# Patient Record
Sex: FEMALE | Race: BLACK OR AFRICAN AMERICAN | NOT HISPANIC OR LATINO | Employment: OTHER | RURAL
[De-identification: names, ages, dates, MRNs, and addresses within clinical notes are randomized per-mention and may not be internally consistent; named-entity substitution may affect disease eponyms.]

---

## 2018-09-12 LAB — CRC RECOMMENDATION EXT: NORMAL

## 2020-10-26 ENCOUNTER — HISTORICAL (OUTPATIENT)
Dept: ADMINISTRATIVE | Facility: HOSPITAL | Age: 45
End: 2020-10-26

## 2020-10-26 LAB
ALT SERPL W P-5'-P-CCNC: 66 U/L (ref 13–56)
AST SERPL W P-5'-P-CCNC: 52 U/L (ref 15–37)
BASOPHILS # BLD AUTO: 0.04 X10E3/UL (ref 0–0.2)
BASOPHILS NFR BLD AUTO: 0.7 % (ref 0–1)
CRP SERPL-MCNC: <0.29 MG/DL (ref 0–0.8)
EOSINOPHIL # BLD AUTO: 0.03 X10E3/UL (ref 0–0.5)
EOSINOPHIL NFR BLD AUTO: 0.5 % (ref 1–4)
ERYTHROCYTE [DISTWIDTH] IN BLOOD BY AUTOMATED COUNT: 12.8 % (ref 11.5–14.5)
ERYTHROCYTE [SEDIMENTATION RATE] IN BLOOD BY WESTERGREN METHOD: 14 MM/HR (ref 0–20)
HCT VFR BLD AUTO: 43.5 % (ref 38–47)
HGB BLD-MCNC: 13.8 G/DL (ref 12–16)
IMM GRANULOCYTES # BLD AUTO: 0.01 X10E3/UL (ref 0–0.04)
IMM GRANULOCYTES NFR BLD: 0.2 % (ref 0–0.4)
LYMPHOCYTES # BLD AUTO: 2.01 X10E3/UL (ref 1–4.8)
LYMPHOCYTES NFR BLD AUTO: 36.7 % (ref 27–41)
MCH RBC QN AUTO: 27.7 PG (ref 27–31)
MCHC RBC AUTO-ENTMCNC: 31.7 G/DL (ref 32–36)
MCV RBC AUTO: 87.2 FL (ref 80–96)
MONOCYTES # BLD AUTO: 0.33 X10E3/UL (ref 0–0.8)
MONOCYTES NFR BLD AUTO: 6 % (ref 2–6)
MPC BLD CALC-MCNC: 11.5 FL (ref 9.4–12.4)
NEUTROPHILS # BLD AUTO: 3.05 X10E3/UL (ref 1.8–7.7)
NEUTROPHILS NFR BLD AUTO: 55.9 % (ref 53–65)
NRBC # BLD AUTO: 0 X10E3/UL (ref 0–0)
NRBC, AUTO (.00): 0 /100 (ref 0–0)
PLATELET # BLD AUTO: 308 X10E3/UL (ref 150–400)
RBC # BLD AUTO: 4.99 X10E6/UL (ref 4.2–5.4)
WBC # BLD AUTO: 5.47 X10E3/UL (ref 4.5–11)

## 2021-03-31 ENCOUNTER — HISTORICAL (OUTPATIENT)
Dept: ADMINISTRATIVE | Facility: HOSPITAL | Age: 46
End: 2021-03-31

## 2021-06-21 RX ORDER — METHOTREXATE 2.5 MG/1
TABLET ORAL
COMMUNITY
End: 2023-03-28

## 2021-06-21 RX ORDER — OXYBUTYNIN CHLORIDE 5 MG/1
5 TABLET ORAL NIGHTLY
COMMUNITY
End: 2022-02-11 | Stop reason: ALTCHOICE

## 2021-06-21 RX ORDER — ASPIRIN 81 MG/1
81 TABLET ORAL DAILY
COMMUNITY
End: 2023-06-14

## 2021-06-21 RX ORDER — CARBAMAZEPINE 100 MG/1
100 CAPSULE, EXTENDED RELEASE ORAL 2 TIMES DAILY
COMMUNITY
End: 2021-06-29

## 2021-06-21 RX ORDER — METFORMIN HYDROCHLORIDE 500 MG/1
500 TABLET ORAL 2 TIMES DAILY WITH MEALS
COMMUNITY
End: 2022-02-11 | Stop reason: SDUPTHER

## 2021-06-21 RX ORDER — LORATADINE 10 MG/1
10 TABLET ORAL DAILY
COMMUNITY
End: 2021-08-09 | Stop reason: SDUPTHER

## 2021-06-21 RX ORDER — LISINOPRIL 10 MG/1
10 TABLET ORAL DAILY
COMMUNITY
End: 2021-08-09 | Stop reason: SDUPTHER

## 2021-06-21 RX ORDER — ATORVASTATIN CALCIUM 20 MG/1
20 TABLET, FILM COATED ORAL DAILY
COMMUNITY
End: 2021-08-09 | Stop reason: ALTCHOICE

## 2021-06-21 RX ORDER — GABAPENTIN 300 MG/1
300 CAPSULE ORAL 3 TIMES DAILY
COMMUNITY
End: 2021-06-29 | Stop reason: SDUPTHER

## 2021-06-29 ENCOUNTER — OFFICE VISIT (OUTPATIENT)
Dept: FAMILY MEDICINE | Facility: CLINIC | Age: 46
End: 2021-06-29
Payer: MEDICAID

## 2021-06-29 VITALS
SYSTOLIC BLOOD PRESSURE: 126 MMHG | OXYGEN SATURATION: 100 % | RESPIRATION RATE: 18 BRPM | BODY MASS INDEX: 30.86 KG/M2 | HEIGHT: 58 IN | HEART RATE: 86 BPM | WEIGHT: 147 LBS | DIASTOLIC BLOOD PRESSURE: 92 MMHG

## 2021-06-29 DIAGNOSIS — R51.9 CHRONIC INTRACTABLE HEADACHE, UNSPECIFIED HEADACHE TYPE: ICD-10-CM

## 2021-06-29 DIAGNOSIS — M79.2 NEURALGIA: ICD-10-CM

## 2021-06-29 DIAGNOSIS — E53.8 VITAMIN B12 DEFICIENCY: Primary | ICD-10-CM

## 2021-06-29 DIAGNOSIS — G89.29 CHRONIC INTRACTABLE HEADACHE, UNSPECIFIED HEADACHE TYPE: ICD-10-CM

## 2021-06-29 LAB
CRP SERPL-MCNC: <0.29 MG/DL (ref 0–0.8)
ERYTHROCYTE [SEDIMENTATION RATE] IN BLOOD BY WESTERGREN METHOD: 21 MM/HR (ref 0–20)

## 2021-06-29 PROCEDURE — 85651 RBC SED RATE NONAUTOMATED: CPT | Mod: ,,, | Performed by: CLINICAL MEDICAL LABORATORY

## 2021-06-29 PROCEDURE — 96372 PR INJECTION,THERAP/PROPH/DIAG2ST, IM OR SUBCUT: ICD-10-PCS | Mod: ,,, | Performed by: NURSE PRACTITIONER

## 2021-06-29 PROCEDURE — 96372 THER/PROPH/DIAG INJ SC/IM: CPT | Mod: ,,, | Performed by: NURSE PRACTITIONER

## 2021-06-29 PROCEDURE — 99214 PR OFFICE/OUTPT VISIT, EST, LEVL IV, 30-39 MIN: ICD-10-PCS | Mod: 25,,, | Performed by: NURSE PRACTITIONER

## 2021-06-29 PROCEDURE — 99214 OFFICE O/P EST MOD 30 MIN: CPT | Mod: 25,,, | Performed by: NURSE PRACTITIONER

## 2021-06-29 PROCEDURE — 86140 C-REACTIVE PROTEIN: CPT | Mod: ,,, | Performed by: CLINICAL MEDICAL LABORATORY

## 2021-06-29 PROCEDURE — 85651 SEDIMENTATION RATE, AUTOMATED: ICD-10-PCS | Mod: ,,, | Performed by: CLINICAL MEDICAL LABORATORY

## 2021-06-29 PROCEDURE — 86140 C-REACTIVE PROTEIN: ICD-10-PCS | Mod: ,,, | Performed by: CLINICAL MEDICAL LABORATORY

## 2021-06-29 RX ORDER — CYANOCOBALAMIN 1000 UG/ML
1000 INJECTION, SOLUTION INTRAMUSCULAR; SUBCUTANEOUS
Status: COMPLETED | OUTPATIENT
Start: 2021-06-29 | End: 2021-06-29

## 2021-06-29 RX ORDER — GABAPENTIN 300 MG/1
300 CAPSULE ORAL 3 TIMES DAILY
Qty: 90 CAPSULE | Refills: 1 | Status: SHIPPED | OUTPATIENT
Start: 2021-06-29 | End: 2021-08-09 | Stop reason: SDUPTHER

## 2021-06-29 RX ADMIN — CYANOCOBALAMIN 1000 MCG: 1000 INJECTION, SOLUTION INTRAMUSCULAR; SUBCUTANEOUS at 10:06

## 2021-06-30 ENCOUNTER — TELEPHONE (OUTPATIENT)
Dept: FAMILY MEDICINE | Facility: CLINIC | Age: 46
End: 2021-06-30

## 2021-07-09 ENCOUNTER — CLINICAL SUPPORT (OUTPATIENT)
Dept: FAMILY MEDICINE | Facility: CLINIC | Age: 46
End: 2021-07-09
Payer: MEDICAID

## 2021-07-09 DIAGNOSIS — E53.8 VITAMIN B12 DEFICIENCY: Primary | ICD-10-CM

## 2021-07-09 PROCEDURE — 96372 PR INJECTION,THERAP/PROPH/DIAG2ST, IM OR SUBCUT: ICD-10-PCS | Mod: ,,, | Performed by: NURSE PRACTITIONER

## 2021-07-09 PROCEDURE — 96372 THER/PROPH/DIAG INJ SC/IM: CPT | Mod: ,,, | Performed by: NURSE PRACTITIONER

## 2021-07-09 RX ORDER — CYANOCOBALAMIN 1000 UG/ML
1000 INJECTION, SOLUTION INTRAMUSCULAR; SUBCUTANEOUS
Status: COMPLETED | OUTPATIENT
Start: 2021-07-09 | End: 2021-07-09

## 2021-07-09 RX ADMIN — CYANOCOBALAMIN 1000 MCG: 1000 INJECTION, SOLUTION INTRAMUSCULAR; SUBCUTANEOUS at 03:07

## 2021-07-16 ENCOUNTER — IMMUNIZATION (OUTPATIENT)
Dept: FAMILY MEDICINE | Facility: CLINIC | Age: 46
End: 2021-07-16
Payer: MEDICAID

## 2021-07-16 DIAGNOSIS — Z23 NEED FOR VACCINATION: Primary | ICD-10-CM

## 2021-07-16 PROCEDURE — 0011A COVID-19, MRNA, LNP-S, PF, 100 MCG/0.5 ML DOSE VACCINE: ICD-10-PCS | Mod: ,,, | Performed by: FAMILY MEDICINE

## 2021-07-16 PROCEDURE — 91301 COVID-19, MRNA, LNP-S, PF, 100 MCG/0.5 ML DOSE VACCINE: ICD-10-PCS | Mod: ,,, | Performed by: FAMILY MEDICINE

## 2021-07-16 PROCEDURE — 91301 COVID-19, MRNA, LNP-S, PF, 100 MCG/0.5 ML DOSE VACCINE: CPT | Mod: ,,, | Performed by: FAMILY MEDICINE

## 2021-07-16 PROCEDURE — 0011A COVID-19, MRNA, LNP-S, PF, 100 MCG/0.5 ML DOSE VACCINE: CPT | Mod: ,,, | Performed by: FAMILY MEDICINE

## 2021-07-23 ENCOUNTER — CLINICAL SUPPORT (OUTPATIENT)
Dept: FAMILY MEDICINE | Facility: CLINIC | Age: 46
End: 2021-07-23
Payer: MEDICAID

## 2021-07-23 DIAGNOSIS — E53.8 VITAMIN B12 DEFICIENCY: Primary | ICD-10-CM

## 2021-07-23 PROCEDURE — 96372 THER/PROPH/DIAG INJ SC/IM: CPT | Mod: ,,, | Performed by: NURSE PRACTITIONER

## 2021-07-23 PROCEDURE — 96372 PR INJECTION,THERAP/PROPH/DIAG2ST, IM OR SUBCUT: ICD-10-PCS | Mod: ,,, | Performed by: NURSE PRACTITIONER

## 2021-07-23 RX ORDER — CYANOCOBALAMIN 1000 UG/ML
1000 INJECTION, SOLUTION INTRAMUSCULAR; SUBCUTANEOUS
Status: COMPLETED | OUTPATIENT
Start: 2021-07-23 | End: 2021-07-23

## 2021-07-23 RX ADMIN — CYANOCOBALAMIN 1000 MCG: 1000 INJECTION, SOLUTION INTRAMUSCULAR; SUBCUTANEOUS at 03:07

## 2021-07-30 ENCOUNTER — CLINICAL SUPPORT (OUTPATIENT)
Dept: FAMILY MEDICINE | Facility: CLINIC | Age: 46
End: 2021-07-30
Payer: MEDICAID

## 2021-07-30 DIAGNOSIS — E53.8 VITAMIN B12 DEFICIENCY: Primary | ICD-10-CM

## 2021-07-30 PROCEDURE — 96372 THER/PROPH/DIAG INJ SC/IM: CPT | Mod: ,,, | Performed by: NURSE PRACTITIONER

## 2021-07-30 PROCEDURE — 96372 PR INJECTION,THERAP/PROPH/DIAG2ST, IM OR SUBCUT: ICD-10-PCS | Mod: ,,, | Performed by: NURSE PRACTITIONER

## 2021-07-30 RX ORDER — CYANOCOBALAMIN 1000 UG/ML
1000 INJECTION, SOLUTION INTRAMUSCULAR; SUBCUTANEOUS
Status: COMPLETED | OUTPATIENT
Start: 2021-07-30 | End: 2021-07-30

## 2021-07-30 RX ADMIN — CYANOCOBALAMIN 1000 MCG: 1000 INJECTION, SOLUTION INTRAMUSCULAR; SUBCUTANEOUS at 02:07

## 2021-08-09 ENCOUNTER — OFFICE VISIT (OUTPATIENT)
Dept: FAMILY MEDICINE | Facility: CLINIC | Age: 46
End: 2021-08-09
Payer: MEDICAID

## 2021-08-09 VITALS
RESPIRATION RATE: 18 BRPM | HEIGHT: 58 IN | WEIGHT: 147 LBS | DIASTOLIC BLOOD PRESSURE: 84 MMHG | BODY MASS INDEX: 30.86 KG/M2 | HEART RATE: 68 BPM | OXYGEN SATURATION: 99 % | SYSTOLIC BLOOD PRESSURE: 137 MMHG

## 2021-08-09 DIAGNOSIS — M79.2 NEURALGIA: ICD-10-CM

## 2021-08-09 DIAGNOSIS — E11.9 DIABETES MELLITUS WITHOUT COMPLICATION: ICD-10-CM

## 2021-08-09 DIAGNOSIS — I10 ESSENTIAL HYPERTENSION, BENIGN: Primary | ICD-10-CM

## 2021-08-09 DIAGNOSIS — J31.0 CHRONIC RHINITIS: ICD-10-CM

## 2021-08-09 DIAGNOSIS — D64.9 ANEMIA, UNSPECIFIED TYPE: ICD-10-CM

## 2021-08-09 LAB
ALBUMIN SERPL BCP-MCNC: 3.7 G/DL (ref 3.5–5)
ALBUMIN/GLOB SERPL: 1 {RATIO}
ALP SERPL-CCNC: 91 U/L (ref 39–100)
ALT SERPL W P-5'-P-CCNC: 34 U/L (ref 13–56)
ANION GAP SERPL CALCULATED.3IONS-SCNC: 8 MMOL/L (ref 7–16)
AST SERPL W P-5'-P-CCNC: 19 U/L (ref 15–37)
BASOPHILS # BLD AUTO: 0.05 K/UL (ref 0–0.2)
BASOPHILS NFR BLD AUTO: 0.9 % (ref 0–1)
BILIRUB SERPL-MCNC: 0.8 MG/DL (ref 0–1.2)
BUN SERPL-MCNC: 6 MG/DL (ref 7–18)
BUN/CREAT SERPL: 8 (ref 6–20)
CALCIUM SERPL-MCNC: 9.1 MG/DL (ref 8.5–10.1)
CHLORIDE SERPL-SCNC: 105 MMOL/L (ref 98–107)
CHOLEST SERPL-MCNC: 237 MG/DL (ref 0–200)
CHOLEST/HDLC SERPL: 5.4 {RATIO}
CO2 SERPL-SCNC: 29 MMOL/L (ref 21–32)
CREAT SERPL-MCNC: 0.72 MG/DL (ref 0.55–1.02)
DIFFERENTIAL METHOD BLD: ABNORMAL
EOSINOPHIL # BLD AUTO: 0.1 K/UL (ref 0–0.5)
EOSINOPHIL NFR BLD AUTO: 1.8 % (ref 1–4)
ERYTHROCYTE [DISTWIDTH] IN BLOOD BY AUTOMATED COUNT: 12.7 % (ref 11.5–14.5)
EST. AVERAGE GLUCOSE BLD GHB EST-MCNC: 110 MG/DL
FERRITIN SERPL-MCNC: 69 NG/ML (ref 8–252)
GLOBULIN SER-MCNC: 3.8 G/DL (ref 2–4)
GLUCOSE SERPL-MCNC: 108 MG/DL (ref 74–106)
HBA1C MFR BLD HPLC: 5.9 % (ref 4.5–6.6)
HCT VFR BLD AUTO: 42.1 % (ref 38–47)
HDLC SERPL-MCNC: 44 MG/DL (ref 40–60)
HGB BLD-MCNC: 13.5 G/DL (ref 12–16)
IMM GRANULOCYTES # BLD AUTO: 0.01 K/UL (ref 0–0.04)
IMM GRANULOCYTES NFR BLD: 0.2 % (ref 0–0.4)
LDLC SERPL CALC-MCNC: 151 MG/DL
LDLC/HDLC SERPL: 3.4 {RATIO}
LYMPHOCYTES # BLD AUTO: 1.99 K/UL (ref 1–4.8)
LYMPHOCYTES NFR BLD AUTO: 36 % (ref 27–41)
MCH RBC QN AUTO: 27.7 PG (ref 27–31)
MCHC RBC AUTO-ENTMCNC: 32.1 G/DL (ref 32–36)
MCV RBC AUTO: 86.3 FL (ref 80–96)
MONOCYTES # BLD AUTO: 0.4 K/UL (ref 0–0.8)
MONOCYTES NFR BLD AUTO: 7.2 % (ref 2–6)
MPC BLD CALC-MCNC: 11.9 FL (ref 9.4–12.4)
NEUTROPHILS # BLD AUTO: 2.98 K/UL (ref 1.8–7.7)
NEUTROPHILS NFR BLD AUTO: 53.9 % (ref 53–65)
NONHDLC SERPL-MCNC: 193 MG/DL
NRBC # BLD AUTO: 0 X10E3/UL
NRBC, AUTO (.00): 0 %
PLATELET # BLD AUTO: 313 K/UL (ref 150–400)
POTASSIUM SERPL-SCNC: 4 MMOL/L (ref 3.5–5.1)
PROT SERPL-MCNC: 7.5 G/DL (ref 6.4–8.2)
RBC # BLD AUTO: 4.88 M/UL (ref 4.2–5.4)
SODIUM SERPL-SCNC: 138 MMOL/L (ref 136–145)
TRIGL SERPL-MCNC: 211 MG/DL (ref 35–150)
VLDLC SERPL-MCNC: 42 MG/DL
WBC # BLD AUTO: 5.53 K/UL (ref 4.5–11)

## 2021-08-09 PROCEDURE — 80061 LIPID PANEL: CPT | Mod: ,,, | Performed by: CLINICAL MEDICAL LABORATORY

## 2021-08-09 PROCEDURE — 85025 CBC WITH DIFFERENTIAL: ICD-10-PCS | Mod: ,,, | Performed by: CLINICAL MEDICAL LABORATORY

## 2021-08-09 PROCEDURE — 85025 COMPLETE CBC W/AUTO DIFF WBC: CPT | Mod: ,,, | Performed by: CLINICAL MEDICAL LABORATORY

## 2021-08-09 PROCEDURE — 82728 FERRITIN: ICD-10-PCS | Mod: ,,, | Performed by: CLINICAL MEDICAL LABORATORY

## 2021-08-09 PROCEDURE — 82728 ASSAY OF FERRITIN: CPT | Mod: ,,, | Performed by: CLINICAL MEDICAL LABORATORY

## 2021-08-09 PROCEDURE — 80053 COMPREHEN METABOLIC PANEL: CPT | Mod: ,,, | Performed by: CLINICAL MEDICAL LABORATORY

## 2021-08-09 PROCEDURE — 80053 COMPREHENSIVE METABOLIC PANEL: ICD-10-PCS | Mod: ,,, | Performed by: CLINICAL MEDICAL LABORATORY

## 2021-08-09 PROCEDURE — 99214 OFFICE O/P EST MOD 30 MIN: CPT | Mod: ,,, | Performed by: NURSE PRACTITIONER

## 2021-08-09 PROCEDURE — 83036 HEMOGLOBIN A1C: ICD-10-PCS | Mod: ,,, | Performed by: CLINICAL MEDICAL LABORATORY

## 2021-08-09 PROCEDURE — 99214 PR OFFICE/OUTPT VISIT, EST, LEVL IV, 30-39 MIN: ICD-10-PCS | Mod: ,,, | Performed by: NURSE PRACTITIONER

## 2021-08-09 PROCEDURE — 80061 LIPID PANEL: ICD-10-PCS | Mod: ,,, | Performed by: CLINICAL MEDICAL LABORATORY

## 2021-08-09 PROCEDURE — 83036 HEMOGLOBIN GLYCOSYLATED A1C: CPT | Mod: ,,, | Performed by: CLINICAL MEDICAL LABORATORY

## 2021-08-09 RX ORDER — LORATADINE 10 MG/1
10 TABLET ORAL DAILY
Qty: 90 TABLET | Refills: 3 | Status: SHIPPED | OUTPATIENT
Start: 2021-08-09 | End: 2022-10-11 | Stop reason: SDUPTHER

## 2021-08-09 RX ORDER — GABAPENTIN 300 MG/1
300 CAPSULE ORAL 3 TIMES DAILY
Qty: 90 CAPSULE | Refills: 1 | Status: SHIPPED | OUTPATIENT
Start: 2021-08-09 | End: 2022-02-11 | Stop reason: ALTCHOICE

## 2021-08-09 RX ORDER — LISINOPRIL 10 MG/1
10 TABLET ORAL DAILY
Qty: 90 TABLET | Refills: 3 | Status: SHIPPED | OUTPATIENT
Start: 2021-08-09 | End: 2021-08-20

## 2021-08-10 ENCOUNTER — TELEPHONE (OUTPATIENT)
Dept: FAMILY MEDICINE | Facility: CLINIC | Age: 46
End: 2021-08-10

## 2021-08-10 DIAGNOSIS — G44.099 OTHER TRIGEMINAL AUTONOMIC CEPHALGIA (TAC), NOT INTRACTABLE: ICD-10-CM

## 2021-08-10 DIAGNOSIS — E78.5 HYPERLIPIDEMIA, UNSPECIFIED HYPERLIPIDEMIA TYPE: Primary | ICD-10-CM

## 2021-08-10 RX ORDER — ATORVASTATIN CALCIUM 20 MG/1
20 TABLET, FILM COATED ORAL DAILY
Qty: 90 TABLET | Refills: 3 | Status: SHIPPED | OUTPATIENT
Start: 2021-08-10 | End: 2022-02-11 | Stop reason: SDUPTHER

## 2021-08-11 ENCOUNTER — HOSPITAL ENCOUNTER (OUTPATIENT)
Dept: RADIOLOGY | Facility: HOSPITAL | Age: 46
Discharge: HOME | End: 2021-08-11
Attending: NURSE PRACTITIONER
Payer: MEDICAID

## 2021-08-11 ENCOUNTER — PATIENT MESSAGE (OUTPATIENT)
Dept: FAMILY MEDICINE | Facility: CLINIC | Age: 46
End: 2021-08-11

## 2021-08-11 DIAGNOSIS — G44.099 OTHER TRIGEMINAL AUTONOMIC CEPHALGIA (TAC), NOT INTRACTABLE: ICD-10-CM

## 2021-08-11 PROCEDURE — 25500020 PHARM REV CODE 255: Performed by: NURSE PRACTITIONER

## 2021-08-11 PROCEDURE — 70553 MRI BRAIN STEM W/O & W/DYE: CPT | Mod: TC

## 2021-08-11 RX ADMIN — GADOTERATE MEGLUMINE 13 ML: 376.9 INJECTION INTRAVENOUS at 10:08

## 2021-08-15 ENCOUNTER — TELEPHONE (OUTPATIENT)
Dept: FAMILY MEDICINE | Facility: CLINIC | Age: 46
End: 2021-08-15

## 2021-08-16 DIAGNOSIS — M79.2 NEURALGIA: Primary | ICD-10-CM

## 2021-08-20 ENCOUNTER — TELEPHONE (OUTPATIENT)
Dept: FAMILY MEDICINE | Facility: CLINIC | Age: 46
End: 2021-08-20

## 2021-08-20 DIAGNOSIS — I10 ESSENTIAL HYPERTENSION, BENIGN: Primary | ICD-10-CM

## 2021-08-20 DIAGNOSIS — M79.2 NEURALGIA: ICD-10-CM

## 2021-08-20 RX ORDER — LOSARTAN POTASSIUM 50 MG/1
50 TABLET ORAL DAILY
Qty: 90 TABLET | Refills: 3 | Status: SHIPPED | OUTPATIENT
Start: 2021-08-20 | End: 2022-02-11 | Stop reason: ALTCHOICE

## 2021-09-03 ENCOUNTER — HOSPITAL ENCOUNTER (OUTPATIENT)
Dept: RADIOLOGY | Facility: HOSPITAL | Age: 46
Discharge: HOME | End: 2021-09-03
Attending: NURSE PRACTITIONER
Payer: MEDICAID

## 2021-09-03 DIAGNOSIS — M79.2 NEURALGIA: ICD-10-CM

## 2021-09-03 PROCEDURE — 70543 MRI ORBT/FAC/NCK W/O &W/DYE: CPT | Mod: TC

## 2021-09-03 PROCEDURE — 70553 MRI BRAIN STEM W/O & W/DYE: CPT | Mod: TC

## 2021-09-03 PROCEDURE — 25500020 PHARM REV CODE 255: Performed by: NURSE PRACTITIONER

## 2021-09-03 RX ADMIN — GADOTERATE MEGLUMINE 13 ML: 376.9 INJECTION INTRAVENOUS at 02:09

## 2022-02-09 ENCOUNTER — PATIENT MESSAGE (OUTPATIENT)
Dept: FAMILY MEDICINE | Facility: CLINIC | Age: 47
End: 2022-02-09
Payer: MEDICAID

## 2022-02-11 ENCOUNTER — OFFICE VISIT (OUTPATIENT)
Dept: FAMILY MEDICINE | Facility: CLINIC | Age: 47
End: 2022-02-11
Payer: MEDICAID

## 2022-02-11 VITALS
DIASTOLIC BLOOD PRESSURE: 81 MMHG | WEIGHT: 151 LBS | BODY MASS INDEX: 31.7 KG/M2 | OXYGEN SATURATION: 100 % | HEIGHT: 58 IN | RESPIRATION RATE: 18 BRPM | HEART RATE: 89 BPM | SYSTOLIC BLOOD PRESSURE: 127 MMHG

## 2022-02-11 DIAGNOSIS — E11.9 DIABETES MELLITUS WITHOUT COMPLICATION: ICD-10-CM

## 2022-02-11 DIAGNOSIS — E78.5 HYPERLIPIDEMIA, UNSPECIFIED HYPERLIPIDEMIA TYPE: ICD-10-CM

## 2022-02-11 DIAGNOSIS — Z20.822 SUSPECTED COVID-19 VIRUS INFECTION: ICD-10-CM

## 2022-02-11 DIAGNOSIS — R00.2 PALPITATIONS: Primary | ICD-10-CM

## 2022-02-11 DIAGNOSIS — M32.19 SYSTEMIC LUPUS ERYTHEMATOSUS WITH OTHER ORGAN INVOLVEMENT, UNSPECIFIED SLE TYPE: Chronic | ICD-10-CM

## 2022-02-11 PROBLEM — M32.9 LUPUS (SYSTEMIC LUPUS ERYTHEMATOSUS): Chronic | Status: ACTIVE | Noted: 2019-01-01

## 2022-02-11 LAB
ALBUMIN SERPL BCP-MCNC: 3.8 G/DL (ref 3.5–5)
ALBUMIN/GLOB SERPL: 1 {RATIO}
ALP SERPL-CCNC: 97 U/L (ref 39–100)
ALT SERPL W P-5'-P-CCNC: 43 U/L (ref 13–56)
ANION GAP SERPL CALCULATED.3IONS-SCNC: 7 MMOL/L (ref 7–16)
AST SERPL W P-5'-P-CCNC: 23 U/L (ref 15–37)
BASOPHILS # BLD AUTO: 0.04 K/UL (ref 0–0.2)
BASOPHILS NFR BLD AUTO: 0.8 % (ref 0–1)
BILIRUB SERPL-MCNC: 0.5 MG/DL (ref 0–1.2)
BUN SERPL-MCNC: 6 MG/DL (ref 7–18)
BUN/CREAT SERPL: 7 (ref 6–20)
CALCIUM SERPL-MCNC: 9.5 MG/DL (ref 8.5–10.1)
CHLORIDE SERPL-SCNC: 104 MMOL/L (ref 98–107)
CO2 SERPL-SCNC: 31 MMOL/L (ref 21–32)
CREAT SERPL-MCNC: 0.86 MG/DL (ref 0.55–1.02)
DIFFERENTIAL METHOD BLD: ABNORMAL
EOSINOPHIL # BLD AUTO: 0.04 K/UL (ref 0–0.5)
EOSINOPHIL NFR BLD AUTO: 0.8 % (ref 1–4)
ERYTHROCYTE [DISTWIDTH] IN BLOOD BY AUTOMATED COUNT: 12.2 % (ref 11.5–14.5)
EST. AVERAGE GLUCOSE BLD GHB EST-MCNC: 117 MG/DL
FERRITIN SERPL-MCNC: 68 NG/ML (ref 8–252)
GLOBULIN SER-MCNC: 4 G/DL (ref 2–4)
GLUCOSE SERPL-MCNC: 106 MG/DL (ref 74–106)
HBA1C MFR BLD HPLC: 6.1 % (ref 4.5–6.6)
HCT VFR BLD AUTO: 44.8 % (ref 38–47)
HGB BLD-MCNC: 14.2 G/DL (ref 12–16)
IMM GRANULOCYTES # BLD AUTO: 0.01 K/UL (ref 0–0.04)
IMM GRANULOCYTES NFR BLD: 0.2 % (ref 0–0.4)
LYMPHOCYTES # BLD AUTO: 1.96 K/UL (ref 1–4.8)
LYMPHOCYTES NFR BLD AUTO: 38.7 % (ref 27–41)
MAGNESIUM SERPL-MCNC: 2.1 MG/DL (ref 1.7–2.3)
MCH RBC QN AUTO: 27.6 PG (ref 27–31)
MCHC RBC AUTO-ENTMCNC: 31.7 G/DL (ref 32–36)
MCV RBC AUTO: 87.2 FL (ref 80–96)
MONOCYTES # BLD AUTO: 0.29 K/UL (ref 0–0.8)
MONOCYTES NFR BLD AUTO: 5.7 % (ref 2–6)
MPC BLD CALC-MCNC: 12 FL (ref 9.4–12.4)
NEUTROPHILS # BLD AUTO: 2.73 K/UL (ref 1.8–7.7)
NEUTROPHILS NFR BLD AUTO: 53.8 % (ref 53–65)
NRBC # BLD AUTO: 0 X10E3/UL
NRBC, AUTO (.00): 0 %
PLATELET # BLD AUTO: 265 K/UL (ref 150–400)
POTASSIUM SERPL-SCNC: 4 MMOL/L (ref 3.5–5.1)
PROT SERPL-MCNC: 7.8 G/DL (ref 6.4–8.2)
RBC # BLD AUTO: 5.14 M/UL (ref 4.2–5.4)
SODIUM SERPL-SCNC: 138 MMOL/L (ref 136–145)
TSH SERPL DL<=0.005 MIU/L-ACNC: 2.07 UIU/ML (ref 0.36–3.74)
WBC # BLD AUTO: 5.07 K/UL (ref 4.5–11)

## 2022-02-11 PROCEDURE — 85025 CBC WITH DIFFERENTIAL: ICD-10-PCS | Mod: ,,, | Performed by: CLINICAL MEDICAL LABORATORY

## 2022-02-11 PROCEDURE — 1159F PR MEDICATION LIST DOCUMENTED IN MEDICAL RECORD: ICD-10-PCS | Mod: CPTII,,, | Performed by: NURSE PRACTITIONER

## 2022-02-11 PROCEDURE — 3074F SYST BP LT 130 MM HG: CPT | Mod: CPTII,,, | Performed by: NURSE PRACTITIONER

## 2022-02-11 PROCEDURE — 3079F DIAST BP 80-89 MM HG: CPT | Mod: CPTII,,, | Performed by: NURSE PRACTITIONER

## 2022-02-11 PROCEDURE — 84443 ASSAY THYROID STIM HORMONE: CPT | Mod: ,,, | Performed by: CLINICAL MEDICAL LABORATORY

## 2022-02-11 PROCEDURE — 85025 COMPLETE CBC W/AUTO DIFF WBC: CPT | Mod: ,,, | Performed by: CLINICAL MEDICAL LABORATORY

## 2022-02-11 PROCEDURE — 4010F PR ACE/ARB THEARPY RXD/TAKEN: ICD-10-PCS | Mod: CPTII,,, | Performed by: NURSE PRACTITIONER

## 2022-02-11 PROCEDURE — 80053 COMPREHEN METABOLIC PANEL: CPT | Mod: ,,, | Performed by: CLINICAL MEDICAL LABORATORY

## 2022-02-11 PROCEDURE — 3008F PR BODY MASS INDEX (BMI) DOCUMENTED: ICD-10-PCS | Mod: CPTII,,, | Performed by: NURSE PRACTITIONER

## 2022-02-11 PROCEDURE — 86769 SARS-COV-2 SPIKE AB, SEMI-QUANT, S: ICD-10-PCS | Mod: 90,,, | Performed by: CLINICAL MEDICAL LABORATORY

## 2022-02-11 PROCEDURE — 3008F BODY MASS INDEX DOCD: CPT | Mod: CPTII,,, | Performed by: NURSE PRACTITIONER

## 2022-02-11 PROCEDURE — 1159F MED LIST DOCD IN RCRD: CPT | Mod: CPTII,,, | Performed by: NURSE PRACTITIONER

## 2022-02-11 PROCEDURE — 80053 COMPREHENSIVE METABOLIC PANEL: ICD-10-PCS | Mod: ,,, | Performed by: CLINICAL MEDICAL LABORATORY

## 2022-02-11 PROCEDURE — 3074F PR MOST RECENT SYSTOLIC BLOOD PRESSURE < 130 MM HG: ICD-10-PCS | Mod: CPTII,,, | Performed by: NURSE PRACTITIONER

## 2022-02-11 PROCEDURE — 86769 SARS-COV-2 COVID-19 ANTIBODY: CPT | Mod: 90,,, | Performed by: CLINICAL MEDICAL LABORATORY

## 2022-02-11 PROCEDURE — 3079F PR MOST RECENT DIASTOLIC BLOOD PRESSURE 80-89 MM HG: ICD-10-PCS | Mod: CPTII,,, | Performed by: NURSE PRACTITIONER

## 2022-02-11 PROCEDURE — 4010F ACE/ARB THERAPY RXD/TAKEN: CPT | Mod: CPTII,,, | Performed by: NURSE PRACTITIONER

## 2022-02-11 PROCEDURE — 99214 OFFICE O/P EST MOD 30 MIN: CPT | Mod: ,,, | Performed by: NURSE PRACTITIONER

## 2022-02-11 PROCEDURE — 83735 ASSAY OF MAGNESIUM: CPT | Mod: ,,, | Performed by: CLINICAL MEDICAL LABORATORY

## 2022-02-11 PROCEDURE — 82728 ASSAY OF FERRITIN: CPT | Mod: ,,, | Performed by: CLINICAL MEDICAL LABORATORY

## 2022-02-11 PROCEDURE — 83036 HEMOGLOBIN GLYCOSYLATED A1C: CPT | Mod: ,,, | Performed by: CLINICAL MEDICAL LABORATORY

## 2022-02-11 PROCEDURE — 82728 FERRITIN: ICD-10-PCS | Mod: ,,, | Performed by: CLINICAL MEDICAL LABORATORY

## 2022-02-11 PROCEDURE — 83735 MAGNESIUM: ICD-10-PCS | Mod: ,,, | Performed by: CLINICAL MEDICAL LABORATORY

## 2022-02-11 PROCEDURE — 83036 HEMOGLOBIN A1C: ICD-10-PCS | Mod: ,,, | Performed by: CLINICAL MEDICAL LABORATORY

## 2022-02-11 PROCEDURE — 99214 PR OFFICE/OUTPT VISIT, EST, LEVL IV, 30-39 MIN: ICD-10-PCS | Mod: ,,, | Performed by: NURSE PRACTITIONER

## 2022-02-11 PROCEDURE — 84443 TSH: ICD-10-PCS | Mod: ,,, | Performed by: CLINICAL MEDICAL LABORATORY

## 2022-02-11 RX ORDER — METFORMIN HYDROCHLORIDE 500 MG/1
500 TABLET ORAL
Qty: 90 TABLET | Refills: 3 | Status: SHIPPED | OUTPATIENT
Start: 2022-02-11 | End: 2022-06-21 | Stop reason: CLARIF

## 2022-02-11 RX ORDER — LISINOPRIL 10 MG/1
10 TABLET ORAL DAILY
Qty: 90 TABLET | Refills: 3 | Status: SHIPPED | OUTPATIENT
Start: 2022-02-11 | End: 2022-02-21 | Stop reason: SDUPTHER

## 2022-02-11 RX ORDER — ATORVASTATIN CALCIUM 20 MG/1
20 TABLET, FILM COATED ORAL DAILY
Qty: 90 TABLET | Refills: 3 | Status: SHIPPED | OUTPATIENT
Start: 2022-02-11 | End: 2022-12-21

## 2022-02-11 RX ORDER — CYCLOSPORINE 0.5 MG/ML
EMULSION OPHTHALMIC
COMMUNITY
Start: 2021-10-20 | End: 2023-12-12 | Stop reason: SDUPTHER

## 2022-02-11 RX ORDER — FOLIC ACID 1 MG/1
1000 TABLET ORAL DAILY
COMMUNITY
Start: 2021-10-21 | End: 2022-12-21

## 2022-02-11 RX ORDER — FLUCONAZOLE 150 MG/1
TABLET ORAL
COMMUNITY
Start: 2022-01-18 | End: 2022-02-11 | Stop reason: ALTCHOICE

## 2022-02-11 RX ORDER — LISINOPRIL 10 MG/1
10 TABLET ORAL DAILY
COMMUNITY
Start: 2021-12-21 | End: 2022-02-11 | Stop reason: SDUPTHER

## 2022-02-11 NOTE — PROGRESS NOTES
"   HUGO Pereira   [facility]  [address]   [place] [address]     PATIENT NAME: Jorge Ivory  : 1975  DATE: 22  MRN: [id number]      Billing Provider: HUGO Pereira  Level of Service:   Patient PCP Information     Provider PCP Type    HUGO Pereira General          Reason for Visit / Chief Complaint: A1C and Follow-up       Update PCP  Update Chief Complaint         History of Present Illness / Problem Focused Workflow     Jorge Ivory is a 46 y.o. female presents to the clinic  For check up. She had recent episode with chest pain and went to [facility] and was kept overnight for  Observation. She ulimately had cardiac stress test that was reported as normal but she reports  She saw the report on portal she saw "perfusion defect" that was unchanged with stress.   She has follow up with Dr Mistry in July.  She has had tachycardia  Up to mid 120's with light activities such as putting clothes in washer,  Walking ( from car parking lot  To front door and had to stop  Walking in the aisles frequently.    She states she had a nurse do a home visit post hospital visit and noted  Blood pressure was slightly elevated.      She last saw rheumatologist in October, Dr Liu, who discontinued the gabapentin that she was taking for headaches. She was then referred to neruologist and she continues to have myoclonus in her eye and legs , even after stopping gabapentin.    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 By the end of her 2nd house she noted palpitations, shortness of breath,etc.    She has diabetes related to steroid use for lupus. [facility] is checking  her sugar daily and was  107 yesterday. Her glucose generally runs 117-125 and rarely takes 142. She is currently taking metformin 500mg daily.        Review of Systems     Review of Systems   Constitutional: Positive for activity change and unexpected weight change.   HENT: " Negative for hearing loss, rhinorrhea and trouble swallowing.    Eyes: Negative for discharge and visual disturbance.   Respiratory: Negative for chest tightness and wheezing.    Cardiovascular: Positive for chest pain and palpitations.   Gastrointestinal: Negative for blood in stool, constipation, diarrhea and vomiting.   Endocrine: Negative for polydipsia and polyuria.   Genitourinary: Negative for difficulty urinating, dysuria, hematuria and menstrual problem.   Musculoskeletal: Positive for arthralgias and joint swelling. Negative for neck pain.   Neurological: Positive for weakness and headaches.   Psychiatric/Behavioral: Negative for confusion and dysphoric mood.        Medical / Social / Family History     Past Medical History:   Diagnosis Date    Anemia     Diabetes mellitus, type 2     Hypertension     Lupus (systemic lupus erythematosus) 2019    Other long term (current) drug therapy     Rheumatoid arthritis 2015    Vitamin D deficiency, unspecified        Past Surgical History:   Procedure Laterality Date    CARPAL TUNNEL RELEASE       SECTION      COLONOSCOPY  2018    [facility] A--Dr. Isaac    HYSTERECTOMY         Social History  Ms.  reports that she has never smoked. She has never used smokeless tobacco. She reports that she does not drink alcohol and does not use drugs.    Family History  Ms.'s family history includes Asthma in her mother; Cancer in her maternal grandmother and paternal grandmother; Diabetes in her maternal grandfather and mother; Heart failure in her mother; Hypertension in her mother; Thyroid disease in her mother.    Medications and Allergies     Medications  No outpatient medications have been marked as taking for the 22 encounter (Office Visit) with HUGO Pereira.       Allergies  Review of patient's allergies indicates:   Allergen Reactions    Iodine and iodide containing products Anaphylaxis    Iodine containing multivitamin Anaphylaxis     "Lincocin [lincomycin] Hives    Penicillins     Vicryl [sutures, polyglycolic acid]        Physical Examination     Vitals:    02/11/22 0951   BP: 127/81   Pulse: 89   Resp: 18   SpO2: 100%   Weight: 68.5 kg (151 lb)   Height: 4' 10" (1.473 m)      Physical Exam  Constitutional:       Appearance: Normal appearance.   HENT:      Mouth/Throat:      Mouth: Mucous membranes are moist.   Eyes:      Conjunctiva/sclera: Conjunctivae normal.   Cardiovascular:      Rate and Rhythm: Normal rate and regular rhythm.      Pulses: Normal pulses.      Heart sounds: Normal heart sounds.   Pulmonary:      Effort: Pulmonary effort is normal.      Breath sounds: Normal breath sounds.   Abdominal:      Palpations: Abdomen is soft.   Feet:      Comments: Protective Sensation (w/ 10 gram monofilament):  Right: Intact  Left: Intact    Visual Inspection:  Normal -  Bilateral    Pedal Pulses:   Right: Present  Left: Present    Posterior tibialis:   Right:Present  Left: Present  Lymphadenopathy:      Cervical:      Right cervical: No superficial, deep or posterior cervical adenopathy.     Left cervical: No superficial, deep or posterior cervical adenopathy.   Skin:     General: Skin is warm and dry.   Neurological:      Mental Status: She is alert and oriented to person, place, and time.          Assessment and Plan (including Health Maintenance)      Problem List  Smart Sets  Document Outside HM   :    Plan: Medical records revewied and CXR had bilateral opacities--no known history of covid.   She did not have TSH done nor Mg with her labs and will need to check. Also check covid antibodies and A1c.    Continue current meds.    She will need her covid booster if her antibodies are negative.  Consider referral  To  Cardiology.        Health Maintenance Due   Topic Date Due    Hepatitis C Screening  Never done    HIV Screening  Never done    TETANUS VACCINE  Never done    Mammogram  Never done    Colorectal Cancer Screening  Never done "    COVID-19 Vaccine (2 - Moderna 3-dose series) 08/13/2021    Influenza Vaccine (1) Never done       Problem List Items Addressed This Visit    None       There are no diagnoses linked to this encounter.   Health Maintenance Topics with due status: Not Due       Topic Last Completion Date    Lipid Panel 08/09/2021       Procedures     No future appointments.     No follow-ups on file.     Signature:  HUGO Pereira    Date of encounter: 2/11/22

## 2022-02-14 NOTE — PROGRESS NOTES
Sincere,   Your ferritin level is normal and  the same as 6 months ago, however, to help with restless legs the ferritin level needs to be 90-- you can try taking some OTC  iron tablets to see if that will help with  leg spasms. Magnesium level is normal. Thyroid test is normal as well as chemistry panel.   Your A1c is 6.1  and want to keep it no higher than this if possible, continue with diet and exercise.  Your blood count is stable.  Please call  if you have any questions.    Angie ARIAS

## 2022-02-15 ENCOUNTER — TELEPHONE (OUTPATIENT)
Dept: FAMILY MEDICINE | Facility: CLINIC | Age: 47
End: 2022-02-15
Payer: MEDICAID

## 2022-02-15 LAB
M SARS-COV-2 SPIKE AB, INTERP, S: POSITIVE
M SARS-COV-2 SPIKE AB, QUANT, S: >250 U/ML

## 2022-02-15 NOTE — TELEPHONE ENCOUNTER
Pt called and reported that she continues to get out of breath and have increased heart rate with any exertion and then takes a long time for her heart rate and breathing to return to normal at rest. She has some chest discomfort when this occurs as well.  She has tried talking with cardiologist office but they have not made additional appt.  Advised to contact her rheumatologist for check up in case lupus is causing the heart issues. She will call Dr Liu now--if she continues to have problems will refer to different cardiologist if desired/tm

## 2022-02-17 ENCOUNTER — PATIENT MESSAGE (OUTPATIENT)
Dept: FAMILY MEDICINE | Facility: CLINIC | Age: 47
End: 2022-02-17
Payer: MEDICAID

## 2022-02-21 DIAGNOSIS — E11.9 DIABETES MELLITUS WITHOUT COMPLICATION: ICD-10-CM

## 2022-02-21 DIAGNOSIS — I10 HYPERTENSION, UNSPECIFIED TYPE: Primary | ICD-10-CM

## 2022-02-21 RX ORDER — LISINOPRIL 10 MG/1
10 TABLET ORAL DAILY
Qty: 90 TABLET | Refills: 3 | Status: SHIPPED | OUTPATIENT
Start: 2022-02-21 | End: 2022-10-11 | Stop reason: SDUPTHER

## 2022-03-04 ENCOUNTER — TELEPHONE (OUTPATIENT)
Dept: FAMILY MEDICINE | Facility: CLINIC | Age: 47
End: 2022-03-04
Payer: MEDICAID

## 2022-03-04 RX ORDER — METFORMIN HYDROCHLORIDE 500 MG/1
500 TABLET, EXTENDED RELEASE ORAL
Qty: 90 TABLET | Refills: 3 | Status: SHIPPED | OUTPATIENT
Start: 2022-03-04 | End: 2023-03-29

## 2022-03-04 NOTE — TELEPHONE ENCOUNTER
Raudel Esteban from [facility] pharmacy called and said that her metformin rx that was sent in on 2/11/22 is for the regular Metformin and not the extended release she was usually taking.

## 2022-03-11 ENCOUNTER — TELEPHONE (OUTPATIENT)
Dept: FAMILY MEDICINE | Facility: CLINIC | Age: 47
End: 2022-03-11
Payer: MEDICAID

## 2022-03-11 DIAGNOSIS — R93.89 ABNORMAL CXR (CHEST X-RAY): Primary | ICD-10-CM

## 2022-03-11 NOTE — TELEPHONE ENCOUNTER
Pt with CXR that showed opacities recently after covid and needs repeat to see if clear. She has mammogram scheduled on 3/14 at 9:30 at [facility]. Will provide order for her to take for CXR with her and they will send me report./tm

## 2022-04-06 ENCOUNTER — OFFICE VISIT (OUTPATIENT)
Dept: FAMILY MEDICINE | Facility: CLINIC | Age: 47
End: 2022-04-06
Payer: MEDICAID

## 2022-04-06 VITALS
RESPIRATION RATE: 18 BRPM | HEIGHT: 58 IN | SYSTOLIC BLOOD PRESSURE: 130 MMHG | HEART RATE: 97 BPM | DIASTOLIC BLOOD PRESSURE: 79 MMHG | OXYGEN SATURATION: 98 % | TEMPERATURE: 98 F | WEIGHT: 152 LBS | BODY MASS INDEX: 31.91 KG/M2

## 2022-04-06 DIAGNOSIS — R00.2 PALPITATIONS: Primary | ICD-10-CM

## 2022-04-06 PROCEDURE — 3044F PR MOST RECENT HEMOGLOBIN A1C LEVEL <7.0%: ICD-10-PCS | Mod: CPTII,,, | Performed by: NURSE PRACTITIONER

## 2022-04-06 PROCEDURE — 83735 MAGNESIUM: ICD-10-PCS | Mod: ,,, | Performed by: CLINICAL MEDICAL LABORATORY

## 2022-04-06 PROCEDURE — 4010F PR ACE/ARB THEARPY RXD/TAKEN: ICD-10-PCS | Mod: CPTII,,, | Performed by: NURSE PRACTITIONER

## 2022-04-06 PROCEDURE — 80048 BASIC METABOLIC PNL TOTAL CA: CPT | Mod: ,,, | Performed by: CLINICAL MEDICAL LABORATORY

## 2022-04-06 PROCEDURE — 1159F PR MEDICATION LIST DOCUMENTED IN MEDICAL RECORD: ICD-10-PCS | Mod: CPTII,,, | Performed by: NURSE PRACTITIONER

## 2022-04-06 PROCEDURE — 80048 BASIC METABOLIC PANEL: ICD-10-PCS | Mod: ,,, | Performed by: CLINICAL MEDICAL LABORATORY

## 2022-04-06 PROCEDURE — 99214 PR OFFICE/OUTPT VISIT, EST, LEVL IV, 30-39 MIN: ICD-10-PCS | Mod: ,,, | Performed by: NURSE PRACTITIONER

## 2022-04-06 PROCEDURE — 3008F BODY MASS INDEX DOCD: CPT | Mod: CPTII,,, | Performed by: NURSE PRACTITIONER

## 2022-04-06 PROCEDURE — 3044F HG A1C LEVEL LT 7.0%: CPT | Mod: CPTII,,, | Performed by: NURSE PRACTITIONER

## 2022-04-06 PROCEDURE — 3075F SYST BP GE 130 - 139MM HG: CPT | Mod: CPTII,,, | Performed by: NURSE PRACTITIONER

## 2022-04-06 PROCEDURE — 4010F ACE/ARB THERAPY RXD/TAKEN: CPT | Mod: CPTII,,, | Performed by: NURSE PRACTITIONER

## 2022-04-06 PROCEDURE — 1159F MED LIST DOCD IN RCRD: CPT | Mod: CPTII,,, | Performed by: NURSE PRACTITIONER

## 2022-04-06 PROCEDURE — 99214 OFFICE O/P EST MOD 30 MIN: CPT | Mod: ,,, | Performed by: NURSE PRACTITIONER

## 2022-04-06 PROCEDURE — 3078F DIAST BP <80 MM HG: CPT | Mod: CPTII,,, | Performed by: NURSE PRACTITIONER

## 2022-04-06 PROCEDURE — 3078F PR MOST RECENT DIASTOLIC BLOOD PRESSURE < 80 MM HG: ICD-10-PCS | Mod: CPTII,,, | Performed by: NURSE PRACTITIONER

## 2022-04-06 PROCEDURE — 83735 ASSAY OF MAGNESIUM: CPT | Mod: ,,, | Performed by: CLINICAL MEDICAL LABORATORY

## 2022-04-06 PROCEDURE — 3075F PR MOST RECENT SYSTOLIC BLOOD PRESS GE 130-139MM HG: ICD-10-PCS | Mod: CPTII,,, | Performed by: NURSE PRACTITIONER

## 2022-04-06 PROCEDURE — 3008F PR BODY MASS INDEX (BMI) DOCUMENTED: ICD-10-PCS | Mod: CPTII,,, | Performed by: NURSE PRACTITIONER

## 2022-04-06 PROCEDURE — 93005 ELECTROCARDIOGRAM TRACING: CPT | Mod: ,,, | Performed by: NURSE PRACTITIONER

## 2022-04-06 PROCEDURE — 93005 PR ELECTROCARDIOGRAM, TRACING: ICD-10-PCS | Mod: ,,, | Performed by: NURSE PRACTITIONER

## 2022-04-06 RX ORDER — PREDNISONE 5 MG/1
5 TABLET ORAL DAILY
COMMUNITY
Start: 2022-03-23 | End: 2023-08-10

## 2022-04-06 NOTE — PROGRESS NOTES
HUGO Pereira   [facility]  [address]   [place] [address]     PATIENT NAME: Jorge Ivory  : 1975  DATE: 22  MRN: [id number]      Billing Provider: HUGO Pereira  Level of Service:   Patient PCP Information     Provider PCP Type    HUGO Pereira General          Reason for Visit / Chief Complaint: Chest Pain and Palpitations       Update PCP  Update Chief Complaint         History of Present Illness / Problem Focused Workflow     Jorge Ivory is a 46 y.o. female presents to the clinic  With 3 day history of chest discomfort  and palpitations  With some shortness of breath  Noted. This has occurred off and on with no trigger that she can determine.  Sometimes the episodes are more pronounced with activity and has to stop; they  Last about 30-45 minutes.  She has taken a baby aspirin the past 3 days just in case.  She has had palpitations in the past and went to the ER and saw Dr Mistry  With stress test showing mild perfusion defect and told her he thougth she was just out of shape and would see her in 6 months.  These  Episodes are getting more frequent;/pronounced.    She is concerned that this may be a muscle--because she has noted some squeezing pain in her chest at times when really active.  She saw me  After her first incidence of going to the ER and I tested her for  Covid antiboides  due to some symptoms that occurred around same time with negative covid test and her Covid antibodies were positive.  We decided that she  Probably had covid and may have had the palpitations with this.      Review of Systems     Review of Systems     Medical / Social / Family History     Past Medical History:   Diagnosis Date    Anemia     Diabetes mellitus, type 2     Hypertension     Lupus (systemic lupus erythematosus) 2019    Other long term (current) drug therapy     Rheumatoid arthritis 2015    Vitamin D deficiency, unspecified        Past Surgical  History:   Procedure Laterality Date    CARPAL TUNNEL RELEASE       SECTION      COLONOSCOPY  2018    [facility] A--Dr. Isaac    HYSTERECTOMY         Social History  MsRaine  reports that she has never smoked. She has never used smokeless tobacco. She reports that she does not drink alcohol and does not use drugs.    Family History  Ms.'s family history includes Asthma in her mother; Cancer in her maternal grandmother and paternal grandmother; Diabetes in her maternal grandfather and mother; Heart failure in her mother; Hypertension in her mother; Thyroid disease in her mother.    Medications and Allergies     Medications  Outpatient Medications Marked as Taking for the 22 encounter (Office Visit) with HUGO Pereira   Medication Sig Dispense Refill    aspirin (ECOTRIN) 81 MG EC tablet Take 81 mg by mouth once daily. 1 tab by mouth daily      atorvastatin (LIPITOR) 20 MG tablet Take 1 tablet (20 mg total) by mouth once daily. 90 tablet 3    folic acid (FOLVITE) 1 MG tablet Take 1,000 mcg by mouth once daily.      lisinopriL 10 MG tablet Take 1 tablet (10 mg total) by mouth once daily. 90 tablet 3    loratadine (CLARITIN) 10 mg tablet Take 1 tablet (10 mg total) by mouth once daily. One tab daily for allergies, sinus drainage as needed 90 tablet 3    metFORMIN (GLUCOPHAGE) 500 MG tablet Take 1 tablet (500 mg total) by mouth daily with breakfast. 1 tab once daily for sugar 90 tablet 3    metFORMIN (GLUCOPHAGE-XR) 500 MG ER 24hr tablet Take 1 tablet (500 mg total) by mouth daily with breakfast. 90 tablet 3    methotrexate 2.5 MG Tab Take by mouth. 3 tab by mouth weekly      predniSONE (DELTASONE) 5 MG tablet Take 5 mg by mouth once daily.      RESTASIS 0.05 % ophthalmic emulsion INSTILL ONE DROP IN EACH EYE TWICE DAILY AS DIRECTED         Allergies  Review of patient's allergies indicates:   Allergen Reactions    Iodine and iodide containing products Anaphylaxis    Iodine containing  "multivitamin Anaphylaxis    Lincocin [lincomycin] Hives    Penicillins     Vicryl [sutures, polyglycolic acid]        Physical Examination     Vitals:    04/06/22 1459   BP: 130/79   Pulse: 97   Resp: 18   Temp: 98.2 °F (36.8 °C)   SpO2: 98%   Weight: 68.9 kg (152 lb)   Height: 4' 10" (1.473 m)      Physical Exam  Constitutional:       Appearance: Normal appearance.   HENT:      Mouth/Throat:      Mouth: Mucous membranes are moist.   Cardiovascular:      Rate and Rhythm: Normal rate and regular rhythm.      Pulses: Normal pulses.      Heart sounds: Normal heart sounds.   Pulmonary:      Effort: Pulmonary effort is normal.      Breath sounds: Normal breath sounds.   Lymphadenopathy:      Cervical:      Right cervical: No superficial, deep or posterior cervical adenopathy.     Left cervical: No superficial, deep or posterior cervical adenopathy.   Neurological:      Mental Status: She is alert and oriented to person, place, and time.          Assessment and Plan (including Health Maintenance)      Problem List  Smart Sets  Document Outside HM   :    Plan:  Will get repeat bmp, magnesium and EKG today and schedule her back with Dr Mistry for further work up.        Health Maintenance Due   Topic Date Due    Hepatitis C Screening  Never done    Diabetes Urine Screening  Never done    Pneumococcal Vaccines (Age 0-64) (1 of 2 - PPSV23) Never done    Eye Exam  Never done    HIV Screening  Never done    TETANUS VACCINE  Never done    Colorectal Cancer Screening  Never done    COVID-19 Vaccine (2 - Moderna 3-dose series) 08/13/2021    Influenza Vaccine (1) Never done       Problem List Items Addressed This Visit    None       There are no diagnoses linked to this encounter.   Health Maintenance Topics with due status: Not Due       Topic Last Completion Date    Lipid Panel 08/09/2021    Foot Exam 02/11/2022    Hemoglobin A1c 02/11/2022    Mammogram 03/14/2022    Low Dose Statin 04/06/2022       Procedures "     No future appointments.     No follow-ups on file.     Signature:  HUGO Pereira    Date of encounter: 4/6/22

## 2022-04-07 LAB
ANION GAP SERPL CALCULATED.3IONS-SCNC: 8 MMOL/L (ref 7–16)
BUN SERPL-MCNC: 9 MG/DL (ref 7–18)
BUN/CREAT SERPL: 10 (ref 6–20)
CALCIUM SERPL-MCNC: 9.5 MG/DL (ref 8.5–10.1)
CHLORIDE SERPL-SCNC: 105 MMOL/L (ref 98–107)
CO2 SERPL-SCNC: 30 MMOL/L (ref 21–32)
CREAT SERPL-MCNC: 0.92 MG/DL (ref 0.55–1.02)
GLUCOSE SERPL-MCNC: 100 MG/DL (ref 74–106)
MAGNESIUM SERPL-MCNC: 2.3 MG/DL (ref 1.7–2.3)
POTASSIUM SERPL-SCNC: 3.4 MMOL/L (ref 3.5–5.1)
SODIUM SERPL-SCNC: 140 MMOL/L (ref 136–145)

## 2022-04-08 RX ORDER — POTASSIUM CHLORIDE 750 MG/1
10 CAPSULE, EXTENDED RELEASE ORAL ONCE
Qty: 30 CAPSULE | Refills: 0 | Status: SHIPPED | OUTPATIENT
Start: 2022-04-08 | End: 2022-04-08

## 2022-04-08 NOTE — PROGRESS NOTES
Sincere,  Your potassium is just a tad low--I am going to send in a round of potassium to take one daily, just in case you are more sensitive to low potassium and causing the palpitations.  Magnesium level is normal.  Please call if you have any questions.    Angie ARIAS

## 2022-06-21 ENCOUNTER — OFFICE VISIT (OUTPATIENT)
Dept: FAMILY MEDICINE | Facility: CLINIC | Age: 47
End: 2022-06-21
Payer: MEDICAID

## 2022-06-21 VITALS
HEART RATE: 77 BPM | WEIGHT: 148.63 LBS | OXYGEN SATURATION: 99 % | SYSTOLIC BLOOD PRESSURE: 127 MMHG | DIASTOLIC BLOOD PRESSURE: 66 MMHG | TEMPERATURE: 99 F | BODY MASS INDEX: 31.2 KG/M2 | RESPIRATION RATE: 18 BRPM | HEIGHT: 58 IN

## 2022-06-21 DIAGNOSIS — E11.9 DIABETES MELLITUS WITHOUT COMPLICATION: Primary | Chronic | ICD-10-CM

## 2022-06-21 DIAGNOSIS — R00.2 PALPITATIONS: Chronic | ICD-10-CM

## 2022-06-21 DIAGNOSIS — M06.9 RHEUMATOID ARTHRITIS INVOLVING MULTIPLE SITES, UNSPECIFIED WHETHER RHEUMATOID FACTOR PRESENT: Chronic | ICD-10-CM

## 2022-06-21 DIAGNOSIS — K13.0 ANGULAR CHEILITIS: ICD-10-CM

## 2022-06-21 DIAGNOSIS — J34.89 INTERNAL NASAL LESION: ICD-10-CM

## 2022-06-21 PROBLEM — I10 ESSENTIAL HYPERTENSION, BENIGN: Chronic | Status: ACTIVE | Noted: 2021-08-09

## 2022-06-21 PROBLEM — E78.5 HYPERLIPIDEMIA: Chronic | Status: ACTIVE | Noted: 2022-02-11

## 2022-06-21 LAB
CREAT UR-MCNC: 133 MG/DL (ref 28–219)
EST. AVERAGE GLUCOSE BLD GHB EST-MCNC: 117 MG/DL
HBA1C MFR BLD HPLC: 6.1 % (ref 4.5–6.6)
MICROALBUMIN UR-MCNC: 0.6 MG/DL (ref 0–2.8)
MICROALBUMIN/CREAT RATIO PNL UR: 4.5 MG/G (ref 0–30)

## 2022-06-21 PROCEDURE — 3008F PR BODY MASS INDEX (BMI) DOCUMENTED: ICD-10-PCS | Mod: CPTII,,, | Performed by: NURSE PRACTITIONER

## 2022-06-21 PROCEDURE — 4010F PR ACE/ARB THEARPY RXD/TAKEN: ICD-10-PCS | Mod: CPTII,,, | Performed by: NURSE PRACTITIONER

## 2022-06-21 PROCEDURE — 3074F SYST BP LT 130 MM HG: CPT | Mod: CPTII,,, | Performed by: NURSE PRACTITIONER

## 2022-06-21 PROCEDURE — 82043 MICROALBUMIN / CREATININE RATIO URINE: ICD-10-PCS | Mod: ,,, | Performed by: CLINICAL MEDICAL LABORATORY

## 2022-06-21 PROCEDURE — 82570 MICROALBUMIN / CREATININE RATIO URINE: ICD-10-PCS | Mod: ,,, | Performed by: CLINICAL MEDICAL LABORATORY

## 2022-06-21 PROCEDURE — 3078F DIAST BP <80 MM HG: CPT | Mod: CPTII,,, | Performed by: NURSE PRACTITIONER

## 2022-06-21 PROCEDURE — 83036 HEMOGLOBIN GLYCOSYLATED A1C: CPT | Mod: ,,, | Performed by: CLINICAL MEDICAL LABORATORY

## 2022-06-21 PROCEDURE — 82570 ASSAY OF URINE CREATININE: CPT | Mod: ,,, | Performed by: CLINICAL MEDICAL LABORATORY

## 2022-06-21 PROCEDURE — 3044F PR MOST RECENT HEMOGLOBIN A1C LEVEL <7.0%: ICD-10-PCS | Mod: CPTII,,, | Performed by: NURSE PRACTITIONER

## 2022-06-21 PROCEDURE — 83036 HEMOGLOBIN A1C: ICD-10-PCS | Mod: ,,, | Performed by: CLINICAL MEDICAL LABORATORY

## 2022-06-21 PROCEDURE — 99214 PR OFFICE/OUTPT VISIT, EST, LEVL IV, 30-39 MIN: ICD-10-PCS | Mod: ,,, | Performed by: NURSE PRACTITIONER

## 2022-06-21 PROCEDURE — 4010F ACE/ARB THERAPY RXD/TAKEN: CPT | Mod: CPTII,,, | Performed by: NURSE PRACTITIONER

## 2022-06-21 PROCEDURE — 99214 OFFICE O/P EST MOD 30 MIN: CPT | Mod: ,,, | Performed by: NURSE PRACTITIONER

## 2022-06-21 PROCEDURE — 3078F PR MOST RECENT DIASTOLIC BLOOD PRESSURE < 80 MM HG: ICD-10-PCS | Mod: CPTII,,, | Performed by: NURSE PRACTITIONER

## 2022-06-21 PROCEDURE — 3044F HG A1C LEVEL LT 7.0%: CPT | Mod: CPTII,,, | Performed by: NURSE PRACTITIONER

## 2022-06-21 PROCEDURE — 82043 UR ALBUMIN QUANTITATIVE: CPT | Mod: ,,, | Performed by: CLINICAL MEDICAL LABORATORY

## 2022-06-21 PROCEDURE — 3074F PR MOST RECENT SYSTOLIC BLOOD PRESSURE < 130 MM HG: ICD-10-PCS | Mod: CPTII,,, | Performed by: NURSE PRACTITIONER

## 2022-06-21 PROCEDURE — 3008F BODY MASS INDEX DOCD: CPT | Mod: CPTII,,, | Performed by: NURSE PRACTITIONER

## 2022-06-21 RX ORDER — MUPIROCIN 20 MG/G
OINTMENT TOPICAL 3 TIMES DAILY
Qty: 22 G | Refills: 0 | Status: SHIPPED | OUTPATIENT
Start: 2022-06-21 | End: 2022-10-11 | Stop reason: ALTCHOICE

## 2022-06-21 NOTE — PROGRESS NOTES
HUGO Pereira   [facility]  [address]   [place] [address]     PATIENT NAME: Jorge Ivory  : 1975  DATE: 22  MRN: [id number]      Billing Provider: HUGO Pereira  Level of Service:   Patient PCP Information     Provider PCP Type    HUGO Pereira General          Reason for Visit / Chief Complaint: Annual Exam (Following up on A1c. And wants to discuss recent cardiology appt/results.) and lesion in nostril       Update PCP  Update Chief Complaint         History of Present Illness / Problem Focused Workflow     Jorge Ivory is a 46 y.o. female presents to the clinic  For diabetes check up--she is checking her  Glucose and ranges  (after eating something sweet)--she is checking her glucose once daily and prn.    She has lost 10# since last visit.  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   She has had another episode of palpitations and had ER visit since her monitor.  Dr Barrios wants to watch her for now before getting pacemaker.  She has had sleep studies per Dr Xiong (at home test) and waiting on results.      She has recurrent sore in left nostril  And not treated with meds and has had cheilitis.    She follows with Dr Corona for renal. Follows with Dr Liu for RA      Review of Systems     Review of Systems   Constitutional: Negative for activity change and unexpected weight change.   HENT: Positive for hearing loss (ringing in the ears). Negative for rhinorrhea and trouble swallowing.         Sore in left nostril   Eyes: Positive for visual disturbance (blurred vision). Negative for discharge.   Respiratory: Negative for chest tightness and wheezing.    Cardiovascular: Negative for chest pain and palpitations.   Gastrointestinal: Negative for blood in stool, constipation, diarrhea and vomiting.    Endocrine: Negative for polydipsia and polyuria.   Genitourinary: Negative for difficulty urinating, dysuria, hematuria and menstrual problem.   Musculoskeletal: Positive for arthralgias and neck pain. Negative for joint swelling.   Integumentary:         Toenails dark colored.   Neurological: Positive for headaches. Negative for weakness.   Psychiatric/Behavioral: Negative for confusion and dysphoric mood.        Medical / Social / Family History     Past Medical History:   Diagnosis Date    Anemia     Diabetes mellitus, type 2     Hypertension     Lupus (systemic lupus erythematosus) 2019    Other long term (current) drug therapy     Rheumatoid arthritis 2015    Vitamin D deficiency, unspecified        Past Surgical History:   Procedure Laterality Date    CARPAL TUNNEL RELEASE       SECTION      COLONOSCOPY  2018    [facility] A--Dr. Isaac    HYSTERECTOMY         Social History  Ms.  reports that she has never smoked. She has never used smokeless tobacco. She reports that she does not drink alcohol and does not use drugs.    Family History  Ms.'s family history includes Asthma in her mother; Cancer in her maternal grandmother and paternal grandmother; Diabetes in her maternal grandfather and mother; Heart failure in her mother; Hypertension in her mother; Thyroid disease in her mother.    Medications and Allergies     Medications  Outpatient Medications Marked as Taking for the 22 encounter (Office Visit) with HUGO Pereira   Medication Sig Dispense Refill    aspirin (ECOTRIN) 81 MG EC tablet Take 81 mg by mouth once daily. 1 tab by mouth daily      atorvastatin (LIPITOR) 20 MG tablet Take 1 tablet (20 mg total) by mouth once daily. 90 tablet 3    folic acid (FOLVITE) 1 MG tablet Take 1,000 mcg by mouth once daily.      lisinopriL 10 MG tablet Take 1 tablet (10 mg total) by mouth once daily. 90 tablet 3    loratadine (CLARITIN) 10 mg tablet Take 1 tablet (10 mg total) by mouth  "once daily. One tab daily for allergies, sinus drainage as needed 90 tablet 3    metFORMIN (GLUCOPHAGE-XR) 500 MG ER 24hr tablet Take 1 tablet (500 mg total) by mouth daily with breakfast. 90 tablet 3    predniSONE (DELTASONE) 5 MG tablet Take 5 mg by mouth once daily.      RESTASIS 0.05 % ophthalmic emulsion INSTILL ONE DROP IN EACH EYE TWICE DAILY AS DIRECTED         Allergies  Review of patient's allergies indicates:   Allergen Reactions    Iodine and iodide containing products Anaphylaxis    Iodine containing multivitamin Anaphylaxis    Lincocin [lincomycin] Hives    Penicillins     Vicryl [sutures, polyglycolic acid]        Physical Examination     Vitals:    06/21/22 0945   BP: 127/66   BP Location: Left arm   Patient Position: Sitting   BP Method: X-Large (Automatic)   Pulse: 77   Resp: 18   Temp: 98.7 °F (37.1 °C)   TempSrc: Oral   SpO2: 99%   Weight: 67.4 kg (148 lb 9.6 oz)   Height: 4' 10" (1.473 m)      Physical Exam  Constitutional:       Appearance: Normal appearance.   HENT:      Nose:      Comments: Left nare mildly inflammed and red. Right nare normal     Mouth/Throat:      Comments: Tongue white and rough  Cardiovascular:      Rate and Rhythm: Normal rate and regular rhythm.      Pulses: Normal pulses.      Heart sounds: Normal heart sounds.   Pulmonary:      Effort: Pulmonary effort is normal.      Breath sounds: Normal breath sounds.   Skin:     General: Skin is warm and dry.      Comments: Toenails cut short and have dark discoloration noted around edges.    Anterior mid chest with tinea type rash noted.     Neurological:      General: No focal deficit present.      Mental Status: She is alert and oriented to person, place, and time.   Psychiatric:         Mood and Affect: Mood normal.         Behavior: Behavior normal.          Assessment and Plan (including Health Maintenance)      Problem List  Smart Sets  Document Outside HM   :    Plan:  Will check A1c today ( she has had mutliple " tests in ER).  Advised to use Lotrimin AF for chest rash, discusse toenails with Dr Liu in case psoriatic arthritis.  Will treat nasal infection with topical mupirocin and she will aslo use on chelitis.  Discussed need for eye exam asap.        Health Maintenance Due   Topic Date Due    Hepatitis C Screening  Never done    Diabetes Urine Screening  Never done    Eye Exam  Never done    HIV Screening  Never done    TETANUS VACCINE  Never done    Colorectal Cancer Screening  Never done    COVID-19 Vaccine (2 - Moderna series) 08/13/2021       Problem List Items Addressed This Visit        ENT    Angular cheilitis    Internal nasal lesion       Cardiac/Vascular    Palpitations (Chronic)       Immunology/Multi System    Rheumatoid arthritis (Chronic)       Endocrine    Diabetes mellitus without complication - Primary    Relevant Orders    Hemoglobin A1C    Microalbumin/Creatinine Ratio, Urine        Diabetes mellitus without complication  -     Hemoglobin A1C; Future; Expected date: 06/21/2022  -     Microalbumin/Creatinine Ratio, Urine    Rheumatoid arthritis involving multiple sites, unspecified whether rheumatoid factor present    Palpitations    Angular cheilitis    Internal nasal lesion    Other orders  -     mupirocin (BACTROBAN) 2 % ointment; Apply topically 3 (three) times daily.  Dispense: 22 g; Refill: 0       Health Maintenance Topics with due status: Not Due       Topic Last Completion Date    Lipid Panel 08/09/2021    Foot Exam 02/11/2022    Hemoglobin A1c 02/11/2022    Mammogram 03/14/2022    Low Dose Statin 04/06/2022    Influenza Vaccine Not Due       Procedures     No future appointments.     No follow-ups on file.     Signature:  HUGO Pereira    Date of encounter: 6/21/22

## 2022-06-22 ENCOUNTER — PATIENT MESSAGE (OUTPATIENT)
Dept: FAMILY MEDICINE | Facility: CLINIC | Age: 47
End: 2022-06-22
Payer: MEDICAID

## 2022-06-22 NOTE — PROGRESS NOTES
Sincere,  Your urine test is normal, A1c is excellent at 6.1--keep up the good work.  Please call if you have any questions.    Angie ARIAS

## 2022-10-11 ENCOUNTER — OFFICE VISIT (OUTPATIENT)
Dept: FAMILY MEDICINE | Facility: CLINIC | Age: 47
End: 2022-10-11
Payer: MEDICAID

## 2022-10-11 VITALS
HEIGHT: 58 IN | BODY MASS INDEX: 30.23 KG/M2 | HEART RATE: 73 BPM | OXYGEN SATURATION: 99 % | SYSTOLIC BLOOD PRESSURE: 122 MMHG | RESPIRATION RATE: 18 BRPM | TEMPERATURE: 99 F | DIASTOLIC BLOOD PRESSURE: 77 MMHG | WEIGHT: 144 LBS

## 2022-10-11 DIAGNOSIS — R00.2 PALPITATIONS: Chronic | ICD-10-CM

## 2022-10-11 DIAGNOSIS — G25.81 RESTLESS LEG SYNDROME DUE TO IRON DEFICIENCY ANEMIA: Chronic | ICD-10-CM

## 2022-10-11 DIAGNOSIS — H83.3X3 NOISE-INDUCED HEARING LOSS OF BOTH EARS: ICD-10-CM

## 2022-10-11 DIAGNOSIS — E55.9 VITAMIN D DEFICIENCY DISEASE: Chronic | ICD-10-CM

## 2022-10-11 DIAGNOSIS — I10 ESSENTIAL HYPERTENSION, BENIGN: Chronic | ICD-10-CM

## 2022-10-11 DIAGNOSIS — D50.9 RESTLESS LEG SYNDROME DUE TO IRON DEFICIENCY ANEMIA: Chronic | ICD-10-CM

## 2022-10-11 DIAGNOSIS — G47.33 OSA ON CPAP: Chronic | ICD-10-CM

## 2022-10-11 DIAGNOSIS — Z11.4 ENCOUNTER FOR SCREENING FOR HIV: ICD-10-CM

## 2022-10-11 DIAGNOSIS — E78.00 PURE HYPERCHOLESTEROLEMIA: Chronic | ICD-10-CM

## 2022-10-11 DIAGNOSIS — E11.9 DIABETES MELLITUS WITHOUT COMPLICATION: Chronic | ICD-10-CM

## 2022-10-11 DIAGNOSIS — Z11.59 NEED FOR HEPATITIS C SCREENING TEST: ICD-10-CM

## 2022-10-11 DIAGNOSIS — M32.14 SYSTEMIC LUPUS ERYTHEMATOSUS WITH GLOMERULAR DISEASE, UNSPECIFIED SLE TYPE: Primary | Chronic | ICD-10-CM

## 2022-10-11 DIAGNOSIS — J31.0 CHRONIC RHINITIS: Chronic | ICD-10-CM

## 2022-10-11 DIAGNOSIS — K21.00 GASTROESOPHAGEAL REFLUX DISEASE WITH ESOPHAGITIS WITHOUT HEMORRHAGE: Chronic | ICD-10-CM

## 2022-10-11 LAB
EST. AVERAGE GLUCOSE BLD GHB EST-MCNC: 120 MG/DL
HBA1C MFR BLD HPLC: 6.2 % (ref 4.5–6.6)

## 2022-10-11 PROCEDURE — 82306 VITAMIN D: ICD-10-PCS | Mod: ,,, | Performed by: CLINICAL MEDICAL LABORATORY

## 2022-10-11 PROCEDURE — 86803 HEPATITIS C ANTIBODY: ICD-10-PCS | Mod: ,,, | Performed by: CLINICAL MEDICAL LABORATORY

## 2022-10-11 PROCEDURE — 80061 LIPID PANEL: CPT | Mod: ,,, | Performed by: CLINICAL MEDICAL LABORATORY

## 2022-10-11 PROCEDURE — 3078F PR MOST RECENT DIASTOLIC BLOOD PRESSURE < 80 MM HG: ICD-10-PCS | Mod: CPTII,,, | Performed by: NURSE PRACTITIONER

## 2022-10-11 PROCEDURE — 82728 FERRITIN: ICD-10-PCS | Mod: ,,, | Performed by: CLINICAL MEDICAL LABORATORY

## 2022-10-11 PROCEDURE — 3061F NEG MICROALBUMINURIA REV: CPT | Mod: CPTII,,, | Performed by: NURSE PRACTITIONER

## 2022-10-11 PROCEDURE — 1159F MED LIST DOCD IN RCRD: CPT | Mod: CPTII,,, | Performed by: NURSE PRACTITIONER

## 2022-10-11 PROCEDURE — 87389 HIV 1 / 2 ANTIBODY: ICD-10-PCS | Mod: ,,, | Performed by: CLINICAL MEDICAL LABORATORY

## 2022-10-11 PROCEDURE — 1159F PR MEDICATION LIST DOCUMENTED IN MEDICAL RECORD: ICD-10-PCS | Mod: CPTII,,, | Performed by: NURSE PRACTITIONER

## 2022-10-11 PROCEDURE — 82306 VITAMIN D 25 HYDROXY: CPT | Mod: ,,, | Performed by: CLINICAL MEDICAL LABORATORY

## 2022-10-11 PROCEDURE — 99214 OFFICE O/P EST MOD 30 MIN: CPT | Mod: ,,, | Performed by: NURSE PRACTITIONER

## 2022-10-11 PROCEDURE — 86803 HEPATITIS C AB TEST: CPT | Mod: ,,, | Performed by: CLINICAL MEDICAL LABORATORY

## 2022-10-11 PROCEDURE — 3061F PR NEG MICROALBUMINURIA RESULT DOCUMENTED/REVIEW: ICD-10-PCS | Mod: CPTII,,, | Performed by: NURSE PRACTITIONER

## 2022-10-11 PROCEDURE — 83036 HEMOGLOBIN GLYCOSYLATED A1C: CPT | Mod: ,,, | Performed by: CLINICAL MEDICAL LABORATORY

## 2022-10-11 PROCEDURE — 83036 HEMOGLOBIN A1C: ICD-10-PCS | Mod: ,,, | Performed by: CLINICAL MEDICAL LABORATORY

## 2022-10-11 PROCEDURE — 3044F HG A1C LEVEL LT 7.0%: CPT | Mod: CPTII,,, | Performed by: NURSE PRACTITIONER

## 2022-10-11 PROCEDURE — 99214 PR OFFICE/OUTPT VISIT, EST, LEVL IV, 30-39 MIN: ICD-10-PCS | Mod: ,,, | Performed by: NURSE PRACTITIONER

## 2022-10-11 PROCEDURE — 4010F PR ACE/ARB THEARPY RXD/TAKEN: ICD-10-PCS | Mod: CPTII,,, | Performed by: NURSE PRACTITIONER

## 2022-10-11 PROCEDURE — 3078F DIAST BP <80 MM HG: CPT | Mod: CPTII,,, | Performed by: NURSE PRACTITIONER

## 2022-10-11 PROCEDURE — 82728 ASSAY OF FERRITIN: CPT | Mod: ,,, | Performed by: CLINICAL MEDICAL LABORATORY

## 2022-10-11 PROCEDURE — 3074F PR MOST RECENT SYSTOLIC BLOOD PRESSURE < 130 MM HG: ICD-10-PCS | Mod: CPTII,,, | Performed by: NURSE PRACTITIONER

## 2022-10-11 PROCEDURE — 3008F BODY MASS INDEX DOCD: CPT | Mod: CPTII,,, | Performed by: NURSE PRACTITIONER

## 2022-10-11 PROCEDURE — 80048 BASIC METABOLIC PANEL: ICD-10-PCS | Mod: ,,, | Performed by: CLINICAL MEDICAL LABORATORY

## 2022-10-11 PROCEDURE — 3044F PR MOST RECENT HEMOGLOBIN A1C LEVEL <7.0%: ICD-10-PCS | Mod: CPTII,,, | Performed by: NURSE PRACTITIONER

## 2022-10-11 PROCEDURE — 3066F PR DOCUMENTATION OF TREATMENT FOR NEPHROPATHY: ICD-10-PCS | Mod: CPTII,,, | Performed by: NURSE PRACTITIONER

## 2022-10-11 PROCEDURE — 3074F SYST BP LT 130 MM HG: CPT | Mod: CPTII,,, | Performed by: NURSE PRACTITIONER

## 2022-10-11 PROCEDURE — 80048 BASIC METABOLIC PNL TOTAL CA: CPT | Mod: ,,, | Performed by: CLINICAL MEDICAL LABORATORY

## 2022-10-11 PROCEDURE — 3066F NEPHROPATHY DOC TX: CPT | Mod: CPTII,,, | Performed by: NURSE PRACTITIONER

## 2022-10-11 PROCEDURE — 87389 HIV-1 AG W/HIV-1&-2 AB AG IA: CPT | Mod: ,,, | Performed by: CLINICAL MEDICAL LABORATORY

## 2022-10-11 PROCEDURE — 4010F ACE/ARB THERAPY RXD/TAKEN: CPT | Mod: CPTII,,, | Performed by: NURSE PRACTITIONER

## 2022-10-11 PROCEDURE — 80061 LIPID PANEL: ICD-10-PCS | Mod: ,,, | Performed by: CLINICAL MEDICAL LABORATORY

## 2022-10-11 PROCEDURE — 3008F PR BODY MASS INDEX (BMI) DOCUMENTED: ICD-10-PCS | Mod: CPTII,,, | Performed by: NURSE PRACTITIONER

## 2022-10-11 RX ORDER — LORATADINE 10 MG/1
10 CAPSULE, LIQUID FILLED ORAL DAILY
COMMUNITY
End: 2022-10-11 | Stop reason: ALTCHOICE

## 2022-10-11 RX ORDER — OMEPRAZOLE 20 MG/1
TABLET, DELAYED RELEASE ORAL
COMMUNITY
End: 2022-10-11 | Stop reason: SDUPTHER

## 2022-10-11 RX ORDER — METHOTREXATE 2.5 MG/1
10 TABLET ORAL WEEKLY
COMMUNITY
End: 2023-03-28

## 2022-10-11 RX ORDER — CYCLOSPORINE 0.5 MG/ML
1 EMULSION OPHTHALMIC
COMMUNITY
End: 2022-12-21

## 2022-10-11 RX ORDER — FOLIC ACID 1 MG/1
1 TABLET ORAL DAILY
COMMUNITY
End: 2023-03-28

## 2022-10-11 RX ORDER — ACETAMINOPHEN 500 MG
500 TABLET ORAL 2 TIMES DAILY PRN
COMMUNITY

## 2022-10-11 RX ORDER — OMEPRAZOLE 20 MG/1
TABLET, DELAYED RELEASE ORAL
Qty: 90 TABLET | Refills: 3 | Status: SHIPPED | OUTPATIENT
Start: 2022-10-11 | End: 2022-12-21

## 2022-10-11 RX ORDER — GABAPENTIN 300 MG/1
300 CAPSULE ORAL 3 TIMES DAILY
COMMUNITY
End: 2022-10-11 | Stop reason: ALTCHOICE

## 2022-10-11 RX ORDER — LORATADINE 10 MG/1
10 TABLET ORAL DAILY
Qty: 90 TABLET | Refills: 3 | Status: SHIPPED | OUTPATIENT
Start: 2022-10-11

## 2022-10-11 RX ORDER — CYCLOBENZAPRINE HCL 10 MG
10 TABLET ORAL
COMMUNITY
End: 2023-08-10 | Stop reason: SDUPTHER

## 2022-10-11 RX ORDER — OMEPRAZOLE 20 MG/1
20 CAPSULE, DELAYED RELEASE ORAL EVERY MORNING
COMMUNITY
Start: 2022-07-18 | End: 2023-03-28

## 2022-10-11 RX ORDER — ACETAMINOPHEN 500 MG
1 TABLET ORAL DAILY
COMMUNITY
End: 2023-03-28

## 2022-10-11 RX ORDER — LISINOPRIL 10 MG/1
10 TABLET ORAL DAILY
COMMUNITY
End: 2022-10-11 | Stop reason: SDUPTHER

## 2022-10-11 RX ORDER — LISINOPRIL 10 MG/1
10 TABLET ORAL DAILY
Qty: 90 TABLET | Refills: 3 | Status: SHIPPED | OUTPATIENT
Start: 2022-10-11 | End: 2023-03-28

## 2022-10-11 NOTE — PROGRESS NOTES
HUGO Pereira   [facility]  [address]   [place] [address]     PATIENT NAME: Jorge Ivory  : 1975  DATE: 10/11/22  MRN: [id number]      Billing Provider: HUGO Pereira  Level of Service: MI OFFICE/OUTPT VISIT, EST, LEVL IV, 30-39 MIN  Patient PCP Information       Provider PCP Type    HUGO Pereira General            Reason for Visit / Chief Complaint: check up (Requesting A1c ) and Fatigue (Has done a sleep study but still having fatigue. Has a cardiac monitor implant; received 2022. Is on a Cpap machine also began 2022.)       Update PCP  Update Chief Complaint         History of Present Illness / Problem Focused Workflow     Jorge Ivory is a 47 y.o. female presents to the clinic  for check up.  She has history of lupus, RA, DM and palpitations.  She  now has a LinQ monitor in place with follow up with Dr Mistry in November and is not having as many palpitations as previously. No chest pain.      She has been started on cpap and recently had pressure adjusted--she is on autoregulate from 7-20.   She  still has some daytime fatigue  and her sleep NP does not feel her fatigue is completely related to cpap.   She did have   nocturnal leg movements with a previous  sleep study and was started on some type med but didn't take.     She has history of diabetes and is checking glucose once daily with readings ,  with no hypoglycemia noted.  Lowest glucose was 78 and felt tired.  She has recently had an increased dose x one week of prednisone and noted her glucose has been  higher and has had blurry vision and polyuria.  She was increased on prednisone due to  worsening joint pain and dry eyes related to her  lupus and RA  Discussed with patient she needs to have hearing tested due to tinnitus and decreased hearing.    Review of Systems     Review of Systems   Constitutional:  Positive for activity change. Negative for unexpected weight change.    HENT:  Positive for hearing loss. Negative for rhinorrhea and trouble swallowing.    Eyes:  Positive for visual disturbance. Negative for discharge.   Respiratory:  Negative for chest tightness and wheezing.    Cardiovascular:  Positive for palpitations. Negative for chest pain.   Gastrointestinal:  Positive for constipation. Negative for blood in stool, diarrhea and vomiting.   Endocrine: Positive for polyuria. Negative for polydipsia.   Genitourinary:  Negative for difficulty urinating, dysuria, hematuria and menstrual problem.   Musculoskeletal:  Positive for arthralgias, joint swelling and neck pain.   Neurological:  Positive for headaches. Negative for weakness.   Psychiatric/Behavioral:  Negative for confusion and dysphoric mood.       Medical / Social / Family History     Past Medical History:   Diagnosis Date    Anemia     Diabetes mellitus, type 2     Hypertension     Lupus (systemic lupus erythematosus) 2019    Other long term (current) drug therapy     Rheumatoid arthritis 2015    Vitamin D deficiency, unspecified        Past Surgical History:   Procedure Laterality Date    CARPAL TUNNEL RELEASE       SECTION      COLONOSCOPY  2018    MARCO A--Dr. Isaac    HYSTERECTOMY         Social History  Ms.  reports that she has never smoked. She has never used smokeless tobacco. She reports that she does not drink alcohol and does not use drugs.    Family History  Ms.'s family history includes Asthma in her mother; Cancer in her maternal grandmother and paternal grandmother; Diabetes in her maternal grandfather and mother; Heart failure in her mother; Hypertension in her mother; Thyroid disease in her mother.    Medications and Allergies     Medications  Outpatient Medications Marked as Taking for the 10/11/22 encounter (Office Visit) with HUGO Pereira   Medication Sig Dispense Refill    acetaminophen (TYLENOL) 500 MG tablet Take 500 mg by mouth 2 (two) times daily as needed.      aspirin  "(ECOTRIN) 81 MG EC tablet Take 81 mg by mouth once daily. 1 tab by mouth daily      cholecalciferol, vitamin D3, 125 mcg (5,000 unit) Tab Take 1 tablet by mouth Daily.      cyclobenzaprine (FLEXERIL) 10 MG tablet Take 10 mg by mouth Daily.      cycloSPORINE (RESTASIS) 0.05 % ophthalmic emulsion Apply 1 drop to eye.      folic acid (FOLVITE) 1 MG tablet Take 1,000 mcg by mouth once daily.      metFORMIN (GLUCOPHAGE-XR) 500 MG ER 24hr tablet Take 1 tablet (500 mg total) by mouth daily with breakfast. 90 tablet 3    methotrexate 2.5 MG Tab Take 4 mg by mouth once a week.      predniSONE (DELTASONE) 5 MG tablet Take 5 mg by mouth once daily.      RESTASIS 0.05 % ophthalmic emulsion INSTILL ONE DROP IN EACH EYE TWICE DAILY AS DIRECTED      [DISCONTINUED] lisinopriL 10 MG tablet Take 1 tablet (10 mg total) by mouth once daily. 90 tablet 3    [DISCONTINUED] loratadine (CLARITIN) 10 mg tablet Take 1 tablet (10 mg total) by mouth once daily. One tab daily for allergies, sinus drainage as needed 90 tablet 3    [DISCONTINUED] omeprazole (PRILOSEC OTC) 20 MG tablet 1 capsule         Allergies  Review of patient's allergies indicates:   Allergen Reactions    Iodinated contrast media Shortness Of Breath and Rash    Iodine and iodide containing products Anaphylaxis    Iodine containing multivitamin Anaphylaxis    Cholecalciferol (vitamin d3)     Shellfish derived     Vicryl [sutures, polyglycolic acid]     Iodine Hives and Rash    Lincomycin Hives and Rash    Penicillins Rash       Physical Examination     Vitals:    10/11/22 0945   BP: 122/77   BP Location: Left arm   Patient Position: Sitting   BP Method: X-Large (Automatic)   Pulse: 73   Resp: 18   Temp: 99 °F (37.2 °C)   TempSrc: Oral   SpO2: 99%   Weight: 65.3 kg (144 lb)   Height: 4' 10" (1.473 m)      Physical Exam  Constitutional:       Appearance: Normal appearance.   HENT:      Ears:      Comments: TM's gray bilaterally with scar tissue noted, no erythema.   Hearing loss " noted by patient.     Mouth/Throat:      Mouth: Mucous membranes are moist.   Eyes:      Conjunctiva/sclera: Conjunctivae normal.   Cardiovascular:      Rate and Rhythm: Normal rate and regular rhythm.      Pulses: Normal pulses.      Heart sounds: Normal heart sounds.   Pulmonary:      Effort: Pulmonary effort is normal.      Breath sounds: Normal breath sounds.   Abdominal:      Palpations: Abdomen is soft.   Lymphadenopathy:      Cervical:      Right cervical: No superficial, deep or posterior cervical adenopathy.     Left cervical: No superficial, deep or posterior cervical adenopathy.   Neurological:      Mental Status: She is alert and oriented to person, place, and time.        Assessment and Plan (including Health Maintenance)      Problem List  Smart Sets  Document Outside HM   :    Plan:  will refer for ENT eval due to tinnitus and hearing loss.  Recommended she use noise machine at night to help. She will need A1c today and check ferritin  level as well due to restless leg movements and sleep disruption.  Recheck lipids and try zetia if elevated--she was intolerant of lipitor.  Discussed need for eye exam          Health Maintenance Due   Topic Date Due    Hepatitis C Screening  Never done    Pneumococcal Vaccines (Age 0-64) (1 - PCV) Never done    Eye Exam  Never done    HIV Screening  Never done    TETANUS VACCINE  05/14/2017    Colorectal Cancer Screening  Never done    COVID-19 Vaccine (3 - Booster for Moderna series) 10/08/2021    Lipid Panel  08/09/2022       Problem List Items Addressed This Visit          ENT    Chronic rhinitis    Relevant Medications    loratadine (CLARITIN) 10 mg tablet       Cardiac/Vascular    Essential hypertension, benign (Chronic)    Relevant Medications    lisinopriL 10 MG tablet    Other Relevant Orders    Basic Metabolic Panel    Hyperlipidemia (Chronic)    Relevant Orders    Lipid Panel    Palpitations (Chronic)       Immunology/Multi System    Lupus (systemic lupus  erythematosus) - Primary (Chronic)       Endocrine    Diabetes mellitus without complication    Relevant Orders    Hemoglobin A1C     Other Visit Diagnoses       Vitamin D deficiency disease  (Chronic)       Relevant Orders    Vitamin D    Restless leg syndrome due to iron deficiency anemia  (Chronic)       Relevant Orders    Ferritin    Gastroesophageal reflux disease with esophagitis without hemorrhage  (Chronic)       Relevant Medications    omeprazole (PRILOSEC OTC) 20 MG tablet    Encounter for screening for HIV        Relevant Orders    HIV 1/2 Ag/Ab (4th Gen)    Need for hepatitis C screening test        Relevant Orders    Hepatitis C Antibody    Noise-induced hearing loss of both ears        Relevant Orders    Ambulatory referral/consult to ENT          Systemic lupus erythematosus with glomerular disease, unspecified SLE type    Palpitations    Pure hypercholesterolemia  -     Lipid Panel; Future; Expected date: 10/11/2022    Essential hypertension, benign  -     Basic Metabolic Panel; Future; Expected date: 10/11/2022  -     lisinopriL 10 MG tablet; Take 1 tablet (10 mg total) by mouth once daily.  Dispense: 90 tablet; Refill: 3    Diabetes mellitus without complication  -     Hemoglobin A1C; Future; Expected date: 10/11/2022    Vitamin D deficiency disease  -     Vitamin D; Future; Expected date: 10/11/2022    Restless leg syndrome due to iron deficiency anemia  -     Ferritin; Future; Expected date: 10/11/2022    Chronic rhinitis  -     loratadine (CLARITIN) 10 mg tablet; Take 1 tablet (10 mg total) by mouth once daily. One tab daily for allergies, sinus drainage as needed  Dispense: 90 tablet; Refill: 3    Gastroesophageal reflux disease with esophagitis without hemorrhage  -     omeprazole (PRILOSEC OTC) 20 MG tablet; 1 capsule daily  for reflux  Strength: 20 mg  Dispense: 90 tablet; Refill: 3    Encounter for screening for HIV  -     HIV 1/2 Ag/Ab (4th Gen); Future; Expected date: 10/11/2022    Need  for hepatitis C screening test  -     Hepatitis C Antibody; Future; Expected date: 10/11/2022    Noise-induced hearing loss of both ears  -     Ambulatory referral/consult to ENT; Future; Expected date: 10/18/2022     Health Maintenance Topics with due status: Not Due       Topic Last Completion Date    Foot Exam 02/11/2022    Mammogram 03/14/2022    Low Dose Statin 04/06/2022    Diabetes Urine Screening 06/21/2022    Hemoglobin A1c 06/21/2022       Procedures     No future appointments.     No follow-ups on file.     Signature:  HUGO Pereira    Date of encounter: 10/11/22

## 2022-10-12 ENCOUNTER — PATIENT MESSAGE (OUTPATIENT)
Dept: FAMILY MEDICINE | Facility: CLINIC | Age: 47
End: 2022-10-12
Payer: MEDICAID

## 2022-10-12 DIAGNOSIS — E78.5 HYPERLIPIDEMIA, UNSPECIFIED HYPERLIPIDEMIA TYPE: Primary | Chronic | ICD-10-CM

## 2022-10-12 LAB
25(OH)D3 SERPL-MCNC: 26.4 NG/ML
ANION GAP SERPL CALCULATED.3IONS-SCNC: 12 MMOL/L (ref 7–16)
BUN SERPL-MCNC: 8 MG/DL (ref 7–18)
BUN/CREAT SERPL: 11 (ref 6–20)
CALCIUM SERPL-MCNC: 9.4 MG/DL (ref 8.5–10.1)
CHLORIDE SERPL-SCNC: 104 MMOL/L (ref 98–107)
CHOLEST SERPL-MCNC: 201 MG/DL (ref 0–200)
CHOLEST/HDLC SERPL: 4.3 {RATIO}
CO2 SERPL-SCNC: 26 MMOL/L (ref 21–32)
CREAT SERPL-MCNC: 0.75 MG/DL (ref 0.55–1.02)
EGFR (NO RACE VARIABLE) (RUSH/TITUS): 98 ML/MIN/1.73M²
FERRITIN SERPL-MCNC: 56 NG/ML (ref 8–252)
GLUCOSE SERPL-MCNC: 102 MG/DL (ref 74–106)
HCV AB SER QL: NORMAL
HDLC SERPL-MCNC: 47 MG/DL (ref 40–60)
HIV 1+O+2 AB SERPL QL: NORMAL
LDLC SERPL CALC-MCNC: 135 MG/DL
LDLC/HDLC SERPL: 2.9 {RATIO}
NONHDLC SERPL-MCNC: 154 MG/DL
POTASSIUM SERPL-SCNC: 4.2 MMOL/L (ref 3.5–5.1)
SODIUM SERPL-SCNC: 138 MMOL/L (ref 136–145)
TRIGL SERPL-MCNC: 95 MG/DL (ref 35–150)
VLDLC SERPL-MCNC: 19 MG/DL

## 2022-10-12 RX ORDER — EZETIMIBE 10 MG/1
10 TABLET ORAL DAILY
Qty: 30 TABLET | Refills: 1 | Status: SHIPPED | OUTPATIENT
Start: 2022-10-12 | End: 2023-08-10 | Stop reason: SDUPTHER

## 2022-10-12 NOTE — TELEPHONE ENCOUNTER
Pt requesting new statin to be sent to pharmacy. I did not see that a statin had been prescribed yet. Tegan Miller LPN

## 2022-10-12 NOTE — TELEPHONE ENCOUNTER
Pt requests Zetia to be sent in for cholesterol; pt vo unable to tolerate the previous statin. Tegan Miller LPN

## 2022-11-08 ENCOUNTER — PATIENT MESSAGE (OUTPATIENT)
Dept: FAMILY MEDICINE | Facility: CLINIC | Age: 47
End: 2022-11-08
Payer: MEDICAID

## 2022-11-10 ENCOUNTER — OFFICE VISIT (OUTPATIENT)
Dept: OTOLARYNGOLOGY | Facility: CLINIC | Age: 47
End: 2022-11-10
Payer: MEDICAID

## 2022-11-10 ENCOUNTER — CLINICAL SUPPORT (OUTPATIENT)
Dept: AUDIOLOGY | Facility: CLINIC | Age: 47
End: 2022-11-10
Payer: MEDICAID

## 2022-11-10 VITALS — WEIGHT: 144 LBS | BODY MASS INDEX: 30.23 KG/M2 | HEIGHT: 58 IN

## 2022-11-10 DIAGNOSIS — H90.42 SENSORINEURAL HEARING LOSS (SNHL) OF LEFT EAR WITH UNRESTRICTED HEARING OF RIGHT EAR: Primary | ICD-10-CM

## 2022-11-10 DIAGNOSIS — H90.3 SENSORINEURAL HEARING LOSS (SNHL) OF BOTH EARS: Primary | ICD-10-CM

## 2022-11-10 DIAGNOSIS — H93.13 SUBJECTIVE TINNITUS, BILATERAL: ICD-10-CM

## 2022-11-10 DIAGNOSIS — H69.93 DYSFUNCTION OF BOTH EUSTACHIAN TUBES: ICD-10-CM

## 2022-11-10 DIAGNOSIS — H93.19 TINNITUS, UNSPECIFIED LATERALITY: ICD-10-CM

## 2022-11-10 DIAGNOSIS — H83.3X3 NOISE-INDUCED HEARING LOSS OF BOTH EARS: ICD-10-CM

## 2022-11-10 PROCEDURE — 99204 PR OFFICE/OUTPT VISIT, NEW, LEVL IV, 45-59 MIN: ICD-10-PCS | Mod: S$PBB,,, | Performed by: OTOLARYNGOLOGY

## 2022-11-10 PROCEDURE — 99214 OFFICE O/P EST MOD 30 MIN: CPT | Mod: PBBFAC,25 | Performed by: OTOLARYNGOLOGY

## 2022-11-10 PROCEDURE — 1160F PR REVIEW ALL MEDS BY PRESCRIBER/CLIN PHARMACIST DOCUMENTED: ICD-10-PCS | Mod: CPTII,,, | Performed by: OTOLARYNGOLOGY

## 2022-11-10 PROCEDURE — 99204 OFFICE O/P NEW MOD 45 MIN: CPT | Mod: S$PBB,,, | Performed by: OTOLARYNGOLOGY

## 2022-11-10 PROCEDURE — 1159F PR MEDICATION LIST DOCUMENTED IN MEDICAL RECORD: ICD-10-PCS | Mod: CPTII,,, | Performed by: OTOLARYNGOLOGY

## 2022-11-10 PROCEDURE — 3066F PR DOCUMENTATION OF TREATMENT FOR NEPHROPATHY: ICD-10-PCS | Mod: CPTII,,, | Performed by: OTOLARYNGOLOGY

## 2022-11-10 PROCEDURE — 3061F NEG MICROALBUMINURIA REV: CPT | Mod: CPTII,,, | Performed by: OTOLARYNGOLOGY

## 2022-11-10 PROCEDURE — 92588 EVOKED AUDITORY TST COMPLETE: ICD-10-PCS | Mod: 26,S$PBB,, | Performed by: AUDIOLOGIST

## 2022-11-10 PROCEDURE — 4010F PR ACE/ARB THEARPY RXD/TAKEN: ICD-10-PCS | Mod: CPTII,,, | Performed by: OTOLARYNGOLOGY

## 2022-11-10 PROCEDURE — 3008F PR BODY MASS INDEX (BMI) DOCUMENTED: ICD-10-PCS | Mod: CPTII,,, | Performed by: OTOLARYNGOLOGY

## 2022-11-10 PROCEDURE — 4010F ACE/ARB THERAPY RXD/TAKEN: CPT | Mod: CPTII,,, | Performed by: OTOLARYNGOLOGY

## 2022-11-10 PROCEDURE — 3061F PR NEG MICROALBUMINURIA RESULT DOCUMENTED/REVIEW: ICD-10-PCS | Mod: CPTII,,, | Performed by: OTOLARYNGOLOGY

## 2022-11-10 PROCEDURE — 92567 TYMPANOMETRY: CPT | Mod: PBBFAC | Performed by: AUDIOLOGIST

## 2022-11-10 PROCEDURE — 3044F PR MOST RECENT HEMOGLOBIN A1C LEVEL <7.0%: ICD-10-PCS | Mod: CPTII,,, | Performed by: OTOLARYNGOLOGY

## 2022-11-10 PROCEDURE — 3008F BODY MASS INDEX DOCD: CPT | Mod: CPTII,,, | Performed by: OTOLARYNGOLOGY

## 2022-11-10 PROCEDURE — 3066F NEPHROPATHY DOC TX: CPT | Mod: CPTII,,, | Performed by: OTOLARYNGOLOGY

## 2022-11-10 PROCEDURE — 92588 EVOKED AUDITORY TST COMPLETE: CPT | Mod: 26,S$PBB,, | Performed by: AUDIOLOGIST

## 2022-11-10 PROCEDURE — 92557 COMPREHENSIVE HEARING TEST: CPT | Mod: PBBFAC | Performed by: AUDIOLOGIST

## 2022-11-10 PROCEDURE — 92588 EVOKED AUDITORY TST COMPLETE: CPT | Mod: PBBFAC | Performed by: AUDIOLOGIST

## 2022-11-10 PROCEDURE — 1159F MED LIST DOCD IN RCRD: CPT | Mod: CPTII,,, | Performed by: OTOLARYNGOLOGY

## 2022-11-10 PROCEDURE — 99212 OFFICE O/P EST SF 10 MIN: CPT | Mod: PBBFAC | Performed by: AUDIOLOGIST

## 2022-11-10 PROCEDURE — 3044F HG A1C LEVEL LT 7.0%: CPT | Mod: CPTII,,, | Performed by: OTOLARYNGOLOGY

## 2022-11-10 PROCEDURE — 1160F RVW MEDS BY RX/DR IN RCRD: CPT | Mod: CPTII,,, | Performed by: OTOLARYNGOLOGY

## 2022-11-10 NOTE — PROGRESS NOTES
Subjective:       Patient ID: Sinceandriy Ivory is a 47 y.o. female.    Chief Complaint: Hearing Loss (Patient presents for bilateral hearing loss. )    Hearing Loss:    Associated symptoms: Tinnitus.    Review of Systems   HENT:  Positive for hearing loss and tinnitus.    All other systems reviewed and are negative.    Objective:      Physical Exam  General: NAD  Head: Normocephalic, atraumatic, no facial asymmetry/normal strength,  Ears: Both auricules normal in appearance, w/o deformities tympanic membranes sl dull external auditory canals normal  Nose: External nose w/o deformities normal turbinates no drainage or inflammation  Oral Cavity: Lips, gums, floor of mouth, tongue hard palate, and buccal mucosa without mass/lesion  Oropharynx: Mucosa pink and moist, soft palate, posterior pharynx and oropharyngeal wall without mass/lesion  Neck: Supple, symmetric, trachea midline, no palpable mass/lesion, no palpable cervical lymphadenopathy  Skin: Warm and dry, no concerning lesions  Respiratory: Respirations even, unlabored   Assessment:       1. Sensorineural hearing loss (SNHL) of both ears    2. Tinnitus, unspecified laterality    3. Dysfunction of both eustachian tubes        Plan:       Audio explained in detail flonase to try to open ET

## 2022-12-21 ENCOUNTER — OFFICE VISIT (OUTPATIENT)
Dept: FAMILY MEDICINE | Facility: CLINIC | Age: 47
End: 2022-12-21
Payer: MEDICAID

## 2022-12-21 VITALS
HEIGHT: 58 IN | OXYGEN SATURATION: 98 % | RESPIRATION RATE: 20 BRPM | DIASTOLIC BLOOD PRESSURE: 70 MMHG | TEMPERATURE: 98 F | WEIGHT: 146.63 LBS | BODY MASS INDEX: 30.78 KG/M2 | SYSTOLIC BLOOD PRESSURE: 118 MMHG | HEART RATE: 87 BPM

## 2022-12-21 DIAGNOSIS — D50.9 RESTLESS LEG SYNDROME DUE TO IRON DEFICIENCY ANEMIA: ICD-10-CM

## 2022-12-21 DIAGNOSIS — G25.81 RESTLESS LEG SYNDROME DUE TO IRON DEFICIENCY ANEMIA: ICD-10-CM

## 2022-12-21 DIAGNOSIS — H65.02 ACUTE SEROUS OTITIS MEDIA OF LEFT EAR, RECURRENCE NOT SPECIFIED: ICD-10-CM

## 2022-12-21 DIAGNOSIS — M79.674 GREAT TOE PAIN, RIGHT: Primary | ICD-10-CM

## 2022-12-21 DIAGNOSIS — B37.2 CANDIDIASIS OF NAIL: ICD-10-CM

## 2022-12-21 DIAGNOSIS — E55.9 VITAMIN D DEFICIENCY: ICD-10-CM

## 2022-12-21 LAB
25(OH)D3 SERPL-MCNC: 19.9 NG/ML
FERRITIN SERPL-MCNC: 64 NG/ML (ref 8–252)
MAGNESIUM SERPL-MCNC: 2.1 MG/DL (ref 1.7–2.3)
URATE SERPL-MCNC: 5.4 MG/DL (ref 2.6–6)

## 2022-12-21 PROCEDURE — 82728 ASSAY OF FERRITIN: CPT | Mod: ,,, | Performed by: CLINICAL MEDICAL LABORATORY

## 2022-12-21 PROCEDURE — 3044F HG A1C LEVEL LT 7.0%: CPT | Mod: CPTII,,, | Performed by: NURSE PRACTITIONER

## 2022-12-21 PROCEDURE — 3074F PR MOST RECENT SYSTOLIC BLOOD PRESSURE < 130 MM HG: ICD-10-PCS | Mod: CPTII,,, | Performed by: NURSE PRACTITIONER

## 2022-12-21 PROCEDURE — 90686 IIV4 VACC NO PRSV 0.5 ML IM: CPT | Mod: ,,, | Performed by: NURSE PRACTITIONER

## 2022-12-21 PROCEDURE — 3061F PR NEG MICROALBUMINURIA RESULT DOCUMENTED/REVIEW: ICD-10-PCS | Mod: CPTII,,, | Performed by: NURSE PRACTITIONER

## 2022-12-21 PROCEDURE — 84550 ASSAY OF BLOOD/URIC ACID: CPT | Mod: ,,, | Performed by: CLINICAL MEDICAL LABORATORY

## 2022-12-21 PROCEDURE — 1159F MED LIST DOCD IN RCRD: CPT | Mod: CPTII,,, | Performed by: NURSE PRACTITIONER

## 2022-12-21 PROCEDURE — 3078F PR MOST RECENT DIASTOLIC BLOOD PRESSURE < 80 MM HG: ICD-10-PCS | Mod: CPTII,,, | Performed by: NURSE PRACTITIONER

## 2022-12-21 PROCEDURE — 3044F PR MOST RECENT HEMOGLOBIN A1C LEVEL <7.0%: ICD-10-PCS | Mod: CPTII,,, | Performed by: NURSE PRACTITIONER

## 2022-12-21 PROCEDURE — 90471 IMMUNIZATION ADMIN: CPT | Mod: ,,, | Performed by: NURSE PRACTITIONER

## 2022-12-21 PROCEDURE — 90686 FLU VACCINE (QUAD) GREATER THAN OR EQUAL TO 3YO PRESERVATIVE FREE IM: ICD-10-PCS | Mod: ,,, | Performed by: NURSE PRACTITIONER

## 2022-12-21 PROCEDURE — 3008F BODY MASS INDEX DOCD: CPT | Mod: CPTII,,, | Performed by: NURSE PRACTITIONER

## 2022-12-21 PROCEDURE — 82728 FERRITIN: ICD-10-PCS | Mod: ,,, | Performed by: CLINICAL MEDICAL LABORATORY

## 2022-12-21 PROCEDURE — 3008F PR BODY MASS INDEX (BMI) DOCUMENTED: ICD-10-PCS | Mod: CPTII,,, | Performed by: NURSE PRACTITIONER

## 2022-12-21 PROCEDURE — 82306 VITAMIN D 25 HYDROXY: CPT | Mod: ,,, | Performed by: CLINICAL MEDICAL LABORATORY

## 2022-12-21 PROCEDURE — 84550 URIC ACID: ICD-10-PCS | Mod: ,,, | Performed by: CLINICAL MEDICAL LABORATORY

## 2022-12-21 PROCEDURE — 3078F DIAST BP <80 MM HG: CPT | Mod: CPTII,,, | Performed by: NURSE PRACTITIONER

## 2022-12-21 PROCEDURE — 4010F ACE/ARB THERAPY RXD/TAKEN: CPT | Mod: CPTII,,, | Performed by: NURSE PRACTITIONER

## 2022-12-21 PROCEDURE — 3074F SYST BP LT 130 MM HG: CPT | Mod: CPTII,,, | Performed by: NURSE PRACTITIONER

## 2022-12-21 PROCEDURE — 99214 PR OFFICE/OUTPT VISIT, EST, LEVL IV, 30-39 MIN: ICD-10-PCS | Mod: 25,,, | Performed by: NURSE PRACTITIONER

## 2022-12-21 PROCEDURE — 1159F PR MEDICATION LIST DOCUMENTED IN MEDICAL RECORD: ICD-10-PCS | Mod: CPTII,,, | Performed by: NURSE PRACTITIONER

## 2022-12-21 PROCEDURE — 90471 FLU VACCINE (QUAD) GREATER THAN OR EQUAL TO 3YO PRESERVATIVE FREE IM: ICD-10-PCS | Mod: ,,, | Performed by: NURSE PRACTITIONER

## 2022-12-21 PROCEDURE — 3066F NEPHROPATHY DOC TX: CPT | Mod: CPTII,,, | Performed by: NURSE PRACTITIONER

## 2022-12-21 PROCEDURE — 3061F NEG MICROALBUMINURIA REV: CPT | Mod: CPTII,,, | Performed by: NURSE PRACTITIONER

## 2022-12-21 PROCEDURE — 82306 VITAMIN D: ICD-10-PCS | Mod: ,,, | Performed by: CLINICAL MEDICAL LABORATORY

## 2022-12-21 PROCEDURE — 99214 OFFICE O/P EST MOD 30 MIN: CPT | Mod: 25,,, | Performed by: NURSE PRACTITIONER

## 2022-12-21 PROCEDURE — 3066F PR DOCUMENTATION OF TREATMENT FOR NEPHROPATHY: ICD-10-PCS | Mod: CPTII,,, | Performed by: NURSE PRACTITIONER

## 2022-12-21 PROCEDURE — 4010F PR ACE/ARB THEARPY RXD/TAKEN: ICD-10-PCS | Mod: CPTII,,, | Performed by: NURSE PRACTITIONER

## 2022-12-21 PROCEDURE — 83735 MAGNESIUM: ICD-10-PCS | Mod: ,,, | Performed by: CLINICAL MEDICAL LABORATORY

## 2022-12-21 PROCEDURE — 83735 ASSAY OF MAGNESIUM: CPT | Mod: ,,, | Performed by: CLINICAL MEDICAL LABORATORY

## 2022-12-21 RX ORDER — PREDNISOLONE ACETATE 10 MG/ML
1 SUSPENSION/ DROPS OPHTHALMIC 2 TIMES DAILY
COMMUNITY
Start: 2022-11-01 | End: 2023-12-12

## 2022-12-21 RX ORDER — MELOXICAM 15 MG/1
15 TABLET ORAL DAILY PRN
COMMUNITY
Start: 2022-10-19 | End: 2023-03-28

## 2022-12-21 RX ORDER — FLUCONAZOLE 100 MG/1
100 TABLET ORAL DAILY
Qty: 10 TABLET | Refills: 0 | Status: SHIPPED | OUTPATIENT
Start: 2022-12-21 | End: 2023-01-20

## 2022-12-21 RX ORDER — AZITHROMYCIN 250 MG/1
TABLET, FILM COATED ORAL
Qty: 6 TABLET | Refills: 0 | Status: SHIPPED | OUTPATIENT
Start: 2022-12-21 | End: 2022-12-26

## 2022-12-21 RX ORDER — FERROUS SULFATE 325(65) MG
325 TABLET ORAL
COMMUNITY

## 2022-12-21 NOTE — PROGRESS NOTES
HUGO Pereira   [facility]  [address]   [place] [address]     PATIENT NAME: Sincere AURA Ivory  : 1975  DATE: 22  MRN: [id number]      Billing Provider: HUGO Pereira  Level of Service: [place] OFFICE/OUTPT VISIT, EST, LEVL IV, 30-39 MIN  Patient PCP Information       Provider PCP Type    HUGO Pereira General            Reason for Visit / Chief Complaint: Otalgia (Left ear pain), Toe Pain (Right great toe), and Medication Problem (Metformin has started hurting her stomach. )       Update PCP  Update Chief Complaint         History of Present Illness / Problem Focused Workflow     Sincere AURA Ivory is a 47 y.o. female presents to the clinic  with left ear discomfort and feels like fluid in her ears.  She has seen ENT and diagnosed with chronic hearing loss.  She  has some nasal congestion  with no fever or chills.  She has SEDA and noted her ear popping last night and had difficulty wearing cpap last night. Her pressure has been increased recently  She is having   right great toe pain  that is throbbing x one week.  No swelling.   The pain is actually at her  nailbed and has history of frequent  fungal infections of oropharynx, under breasts and left foot toes.  She is being followed by Dr Liu for rheumatology.  She having abdominal pain and cramping with some diarrhea with metforminER 500mg mike.   Her glucoses have  been up to 140 highest but usually no higher than 120 and last A1c was 6.1 in September.   She continues to feel fatigued and cardiologist  does not seen any reason for this, she is compliant of CPAP and has history of anemia with low ferritin. She has history of Vitamin D  deficiency and will need to be reevaluated.      Review of Systems     Review of Systems   Constitutional:  Negative for fatigue.   HENT:  Positive for nasal congestion and ear pain. Negative for sore throat.    Respiratory:  Negative for cough, chest tightness and shortness of breath.     Cardiovascular:  Negative for chest pain, palpitations and leg swelling.   Gastrointestinal:  Negative for nausea, vomiting and reflux.   Musculoskeletal:  Positive for arthralgias.   Integumentary:  Positive for rash (under breast).   Neurological:  Negative for weakness and memory loss.   Psychiatric/Behavioral:  Negative for confusion and sleep disturbance.       Medical / Social / Family History     Past Medical History:   Diagnosis Date    Anemia     Diabetes mellitus, type 2     Hypertension     Lupus (systemic lupus erythematosus) 2019    Other long term (current) drug therapy     Rheumatoid arthritis 2015    Vitamin D deficiency, unspecified        Past Surgical History:   Procedure Laterality Date    CARPAL TUNNEL RELEASE       SECTION      COLONOSCOPY  2018    MARCO A--Dr. Isaac    HYSTERECTOMY         Social History  Ms.  reports that she has never smoked. She has never used smokeless tobacco. She reports that she does not drink alcohol and does not use drugs.    Family History  Ms.'s family history includes Asthma in her mother; Cancer in her maternal grandmother and paternal grandmother; Diabetes in her maternal grandfather and mother; Heart failure in her mother; Hypertension in her mother; Thyroid disease in her mother.    Medications and Allergies     Medications  Outpatient Medications Marked as Taking for the 22 encounter (Office Visit) with HUGO Pereira   Medication Sig Dispense Refill    acetaminophen (TYLENOL) 500 MG tablet Take 500 mg by mouth 2 (two) times daily as needed.      cyclobenzaprine (FLEXERIL) 10 MG tablet Take 10 mg by mouth as needed.      ezetimibe (ZETIA) 10 mg tablet Take 1 tablet (10 mg total) by mouth once daily. 30 tablet 1    ferrous sulfate (FEOSOL) 325 mg (65 mg iron) Tab tablet Take 325 mg by mouth daily with breakfast.      folic acid (FOLVITE) 1 MG tablet Take 1 mg by mouth Daily.      lisinopriL 10 MG tablet Take 1 tablet (10  "mg total) by mouth once daily. 90 tablet 3    loratadine (CLARITIN) 10 mg tablet Take 1 tablet (10 mg total) by mouth once daily. One tab daily for allergies, sinus drainage as needed 90 tablet 3    metFORMIN (GLUCOPHAGE-XR) 500 MG ER 24hr tablet Take 1 tablet (500 mg total) by mouth daily with breakfast. 90 tablet 3    methotrexate 2.5 MG Tab Take 10 mg by mouth once a week.      omeprazole (PRILOSEC) 20 MG capsule Take 20 mg by mouth every morning.      prednisoLONE acetate (PRED FORTE) 1 % DrpS Place 1 drop into both eyes 2 (two) times daily.      RESTASIS 0.05 % ophthalmic emulsion INSTILL ONE DROP IN EACH EYE TWICE DAILY AS DIRECTED         Allergies  Review of patient's allergies indicates:   Allergen Reactions    Iodinated contrast media Shortness Of Breath and Rash    Iodine and iodide containing products Anaphylaxis    Iodine containing multivitamin Anaphylaxis    Cholecalciferol (vitamin d3)     Shellfish derived     Vicryl [sutures, polyglycolic acid]     Iodine Hives and Rash    Lincomycin Hives and Rash    Penicillins Rash       Physical Examination     Vitals:    12/21/22 0936   BP: 118/70   Pulse: 87   Resp: 20   Temp: 98.3 °F (36.8 °C)   TempSrc: Oral   SpO2: 98%   Weight: 66.5 kg (146 lb 9.6 oz)   Height: 4' 10" (1.473 m)      Physical Exam  Constitutional:       Appearance: Normal appearance.   HENT:      Right Ear: Tympanic membrane normal.      Ears:      Comments: Left tm dull with minimal erythema,      Mouth/Throat:      Mouth: Mucous membranes are moist.      Comments: Tongue white coated with  irregular  margins  Cardiovascular:      Rate and Rhythm: Normal rate and regular rhythm.      Pulses: Normal pulses.      Heart sounds: Normal heart sounds.   Pulmonary:      Effort: Pulmonary effort is normal.      Breath sounds: Normal breath sounds.   Musculoskeletal:      Right lower leg: No edema.      Left lower leg: No edema.   Lymphadenopathy:      Cervical: No cervical " adenopathy.   Skin:     General: Skin is warm and dry.      Comments: Right great toenail is slightly white in color proximally and tender to palpation of toenail.  No erythema, no tenderness of toe except for nail bed.   Neurological:      Mental Status: She is alert and oriented to person, place, and time.        Assessment and Plan (including Health Maintenance)      Problem List  Smart Sets  Document Outside HM   :    Plan:  will stop Metfromin ER and pt will stop for now and check glucose multiple times daily and follow up in 4-6  weeks.    Rx for zithromax provided  to have on hand if left ear pain worsens. Diflucan 100mg daily x 10 days for oropharyngeal candidiasis and toe fungus.   Will get eye exam from Dr Noe in [place].  Flu shot today.        Health Maintenance Due   Topic Date Due    Pneumococcal Vaccines (Age 0-64) (1 - PCV) Never done    Eye Exam  Never done    Low Dose Statin  Never done    TETANUS VACCINE  05/14/2017    Colorectal Cancer Screening  Never done    COVID-19 Vaccine (3 - Booster for Moderna series) 10/08/2021    Foot Exam  02/11/2023       Problem List Items Addressed This Visit          Oncology    Anemia - Primary     Other Visit Diagnoses       Vitamin D deficiency        Relevant Orders    Vitamin D    Restless leg syndrome due to iron deficiency anemia        Relevant Orders    Ferritin    Magnesium    Great toe pain, right        Relevant Orders    Uric Acid    Candidiasis of nail        Relevant Medications    fluconazole (DIFLUCAN) 100 MG tablet    Acute serous otitis media of left ear, recurrence not specified        Relevant Medications    azithromycin (Z-BRAULIO) 250 MG tablet          Iron deficiency anemia secondary to inadequate dietary iron intake    Vitamin D deficiency  -     Vitamin D; Future; Expected date: 12/21/2022    Restless leg syndrome due to iron deficiency anemia  -     Ferritin; Future; Expected date: 12/21/2022  -     Magnesium; Future; Expected date:  12/21/2022    Great toe pain, right  -     Uric Acid; Future; Expected date: 12/21/2022    Candidiasis of nail  -     fluconazole (DIFLUCAN) 100 MG tablet; Take 1 tablet (100 mg total) by mouth once daily.  Dispense: 10 tablet; Refill: 0    Acute serous otitis media of left ear, recurrence not specified  -     azithromycin (Z-BRAULIO) 250 MG tablet; Take 2 tablets by mouth on day 1; Take 1 tablet by mouth on days 2-5  Dispense: 6 tablet; Refill: 0    Other orders  -     Influenza - Quadrivalent (PF)       Health Maintenance Topics with due status: Not Due       Topic Last Completion Date    Mammogram 03/14/2022    Diabetes Urine Screening 06/21/2022    Lipid Panel 10/11/2022    Hemoglobin A1c 10/11/2022       Procedures     No future appointments.     No follow-ups on file.     Signature:  HUGO Pereira    Date of encounter: 12/21/22

## 2022-12-22 ENCOUNTER — TELEPHONE (OUTPATIENT)
Dept: FAMILY MEDICINE | Facility: CLINIC | Age: 47
End: 2022-12-22
Payer: MEDICAID

## 2022-12-22 DIAGNOSIS — E55.9 VITAMIN D DEFICIENCY: Primary | ICD-10-CM

## 2022-12-22 RX ORDER — ERGOCALCIFEROL 1.25 MG/1
50000 CAPSULE ORAL
Qty: 12 CAPSULE | Refills: 0 | Status: SHIPPED | OUTPATIENT
Start: 2022-12-22 | End: 2023-03-29

## 2022-12-22 NOTE — TELEPHONE ENCOUNTER
Pt reports that she is allergic to the prescription form of Vitamin D that in the past she developed a rash while taking it. Pt says that she is willing to take Benadryl with it until she has a reaction. I instructed pt not to take the new rx for Vitamin D until I had discussed this with Angie. Tegan Miller LPN

## 2022-12-22 NOTE — PROGRESS NOTES
Sincere,   Your Vitamin D level is a little low again--I am sending in Rx for high dose to take once weekly.  Ferritin level is  just a little low for restless leg and would be best to take an iron tablet once daily with orange juice to enhance absorption.  Magnesium level is good, uric acid level is normal--no gout.  Please call if any questions      Angie ARIAS

## 2022-12-22 NOTE — TELEPHONE ENCOUNTER
Please advise her to  not take the high dose Vitamin D, but instead take 2 OTC Vitamin D tabs daily/tm

## 2023-01-24 ENCOUNTER — TELEPHONE (OUTPATIENT)
Dept: FAMILY MEDICINE | Facility: CLINIC | Age: 48
End: 2023-01-24
Payer: MEDICAID

## 2023-01-24 NOTE — TELEPHONE ENCOUNTER
Pt reports felt bad yesterday and glucose was 198 and she took a Metformin and felt better and this time the Metformin did not cause any diarrhea. Instructed pt to start keeping a Glucose log and bring with next visit. Pt vo understanding. Tegan Miller LPN

## 2023-01-24 NOTE — TELEPHONE ENCOUNTER
----- Message from Alicia Wolff sent at 1/23/2023  3:01 PM CST -----  Needs a call back regarding her blood sugars [phone]

## 2023-02-01 ENCOUNTER — PATIENT OUTREACH (OUTPATIENT)
Dept: ADMINISTRATIVE | Facility: HOSPITAL | Age: 48
End: 2023-02-01

## 2023-03-03 LAB
LEFT EYE DM RETINOPATHY: NEGATIVE
RIGHT EYE DM RETINOPATHY: NEGATIVE

## 2023-03-28 ENCOUNTER — OFFICE VISIT (OUTPATIENT)
Dept: FAMILY MEDICINE | Facility: CLINIC | Age: 48
End: 2023-03-28
Payer: MEDICAID

## 2023-03-28 VITALS
SYSTOLIC BLOOD PRESSURE: 115 MMHG | HEIGHT: 58 IN | TEMPERATURE: 98 F | OXYGEN SATURATION: 98 % | HEART RATE: 83 BPM | WEIGHT: 148.63 LBS | RESPIRATION RATE: 20 BRPM | BODY MASS INDEX: 31.2 KG/M2 | DIASTOLIC BLOOD PRESSURE: 67 MMHG

## 2023-03-28 DIAGNOSIS — M05.752 RHEUMATOID ARTHRITIS INVOLVING LEFT HIP WITH POSITIVE RHEUMATOID FACTOR: Chronic | ICD-10-CM

## 2023-03-28 DIAGNOSIS — M32.19 SYSTEMIC LUPUS ERYTHEMATOSUS WITH OTHER ORGAN INVOLVEMENT, UNSPECIFIED SLE TYPE: Primary | Chronic | ICD-10-CM

## 2023-03-28 DIAGNOSIS — I10 ESSENTIAL HYPERTENSION, BENIGN: Chronic | ICD-10-CM

## 2023-03-28 DIAGNOSIS — E78.00 PURE HYPERCHOLESTEROLEMIA: Chronic | ICD-10-CM

## 2023-03-28 DIAGNOSIS — E11.9 DIABETES MELLITUS WITHOUT COMPLICATION: Chronic | ICD-10-CM

## 2023-03-28 LAB
EST. AVERAGE GLUCOSE BLD GHB EST-MCNC: 110 MG/DL
HBA1C MFR BLD HPLC: 5.9 % (ref 4.5–6.6)

## 2023-03-28 PROCEDURE — 3008F PR BODY MASS INDEX (BMI) DOCUMENTED: ICD-10-PCS | Mod: CPTII,,, | Performed by: NURSE PRACTITIONER

## 2023-03-28 PROCEDURE — 83036 HEMOGLOBIN A1C: ICD-10-PCS | Mod: ,,, | Performed by: CLINICAL MEDICAL LABORATORY

## 2023-03-28 PROCEDURE — 83036 HEMOGLOBIN GLYCOSYLATED A1C: CPT | Mod: ,,, | Performed by: CLINICAL MEDICAL LABORATORY

## 2023-03-28 PROCEDURE — 3074F PR MOST RECENT SYSTOLIC BLOOD PRESSURE < 130 MM HG: ICD-10-PCS | Mod: CPTII,,, | Performed by: NURSE PRACTITIONER

## 2023-03-28 PROCEDURE — 3008F BODY MASS INDEX DOCD: CPT | Mod: CPTII,,, | Performed by: NURSE PRACTITIONER

## 2023-03-28 PROCEDURE — 3044F HG A1C LEVEL LT 7.0%: CPT | Mod: CPTII,,, | Performed by: NURSE PRACTITIONER

## 2023-03-28 PROCEDURE — 1159F MED LIST DOCD IN RCRD: CPT | Mod: CPTII,,, | Performed by: NURSE PRACTITIONER

## 2023-03-28 PROCEDURE — 99214 PR OFFICE/OUTPT VISIT, EST, LEVL IV, 30-39 MIN: ICD-10-PCS | Mod: ,,, | Performed by: NURSE PRACTITIONER

## 2023-03-28 PROCEDURE — 3078F PR MOST RECENT DIASTOLIC BLOOD PRESSURE < 80 MM HG: ICD-10-PCS | Mod: CPTII,,, | Performed by: NURSE PRACTITIONER

## 2023-03-28 PROCEDURE — 3074F SYST BP LT 130 MM HG: CPT | Mod: CPTII,,, | Performed by: NURSE PRACTITIONER

## 2023-03-28 PROCEDURE — 3078F DIAST BP <80 MM HG: CPT | Mod: CPTII,,, | Performed by: NURSE PRACTITIONER

## 2023-03-28 PROCEDURE — 99214 OFFICE O/P EST MOD 30 MIN: CPT | Mod: ,,, | Performed by: NURSE PRACTITIONER

## 2023-03-28 PROCEDURE — 4010F ACE/ARB THERAPY RXD/TAKEN: CPT | Mod: CPTII,,, | Performed by: NURSE PRACTITIONER

## 2023-03-28 PROCEDURE — 4010F PR ACE/ARB THEARPY RXD/TAKEN: ICD-10-PCS | Mod: CPTII,,, | Performed by: NURSE PRACTITIONER

## 2023-03-28 PROCEDURE — 1159F PR MEDICATION LIST DOCUMENTED IN MEDICAL RECORD: ICD-10-PCS | Mod: CPTII,,, | Performed by: NURSE PRACTITIONER

## 2023-03-28 PROCEDURE — 3044F PR MOST RECENT HEMOGLOBIN A1C LEVEL <7.0%: ICD-10-PCS | Mod: CPTII,,, | Performed by: NURSE PRACTITIONER

## 2023-03-28 RX ORDER — MELOXICAM 7.5 MG/1
7.5 TABLET ORAL DAILY
Qty: 30 TABLET | Refills: 0 | Status: SHIPPED | OUTPATIENT
Start: 2023-03-28 | End: 2023-08-10

## 2023-03-28 RX ORDER — HYDROGEN PEROXIDE 3 %
20 SOLUTION, NON-ORAL MISCELLANEOUS DAILY
COMMUNITY
Start: 2023-03-23

## 2023-03-28 RX ORDER — OXYBUTYNIN CHLORIDE 5 MG/1
TABLET, EXTENDED RELEASE ORAL
COMMUNITY
End: 2023-06-14

## 2023-03-28 RX ORDER — OLMESARTAN MEDOXOMIL 5 MG/1
5 TABLET ORAL DAILY
COMMUNITY
Start: 2023-02-09

## 2023-03-28 RX ORDER — GUAIFENESIN 600 MG/1
TABLET, EXTENDED RELEASE ORAL
COMMUNITY
End: 2023-12-12

## 2023-03-28 NOTE — PROGRESS NOTES
"   HUGO Pereira   [facility]  [address]   [place] [address]     PATIENT NAME: Jorge Ivory  : 1975  DATE: 3/28/23  MRN: [id number]      Billing Provider: HUGO Pereira  Level of Service:   Patient PCP Information       Provider PCP Type    HUGO Pereira General            Reason for Visit / Chief Complaint: Follow-up (DM - due for A1c) and Referral (Her rheumatologist is leaving and she needs to know what you recommend.)         History of Present Illness / Problem Focused Workflow     Jorge Ivory is a 47 y.o. female presents to the clinic     Pt states  saw Dr Mistry in [facility] and was started on metoprolol and she has noted she does not have as much irregular heart beats. She does have a loop monitor and is monitored by Dr Sethi.  She is now on Olmesartan and her bp is much better with new med--she brought labs from Dr Barrios.  She saw Dr Liu about her lupus/rheumatoid and he did not make any med changes because he is leaving but the methotrexate was stopped due to itching in palms/soles and liver enzymes have been elevated.  She does not have a  substitute rheumatologist  at this time--she has tranportation difficulties and cannot go back to [place].  She saw Dr Corona for renal failure and he wants her off methotrexate if possible which she has done.      She is diabetic and is currently  taking nothing   medicine wise and is trying to watch diet and exercise but is limited with exercise due to RA/SLE. She has metfromin but causes significant GI problems and will take metfromin for very short periods if her glucose is very valentino.  She is monitoring her glucose daily at home and ranges from  after breakfast or before  supper.  She has been unable to tolerate statins due to myalgia's and is on zetia.  With most recent .  She is having fewer "yeast" infections of her mouth but tongue remains white with cracking.   She had eye exam  "  at [facility] and will get that for our records  She needs mammogram done but will postpone for now due to loop monitor.      Review of Systems     Review of Systems   Constitutional:  Positive for activity change. Negative for unexpected weight change.   HENT:  Positive for hearing loss. Negative for rhinorrhea and trouble swallowing.    Eyes:  Positive for discharge. Negative for visual disturbance.   Respiratory:  Negative for chest tightness and wheezing.    Cardiovascular:  Positive for palpitations. Negative for chest pain.   Gastrointestinal:  Negative for blood in stool, constipation, diarrhea and vomiting.   Endocrine: Positive for polyuria. Negative for polydipsia.   Genitourinary:  Negative for difficulty urinating, dysuria, hematuria and menstrual problem.   Musculoskeletal:  Positive for arthralgias and neck pain. Negative for joint swelling.   Neurological:  Positive for headaches. Negative for weakness.   Psychiatric/Behavioral:  Positive for dysphoric mood. Negative for confusion.       Medical / Social / Family History     Past Medical History:   Diagnosis Date    Anemia     Diabetes mellitus, type 2     Hypertension     Lupus (systemic lupus erythematosus) 2019    Other long term (current) drug therapy     Rheumatoid arthritis 2015    Vitamin D deficiency, unspecified        Past Surgical History:   Procedure Laterality Date    CARPAL TUNNEL RELEASE       SECTION      COLONOSCOPY  2018    MARCO A--Dr. Isaac    HYSTERECTOMY         Social History  Ms.  reports that she has never smoked. She has never been exposed to tobacco smoke. She has never used smokeless tobacco. She reports that she does not drink alcohol and does not use drugs.    Family History  Ms.'s family history includes Asthma in her mother; Cancer in her maternal grandmother and paternal grandmother; Diabetes in her maternal grandfather and mother; Heart failure in her mother; Hypertension in her mother;  "Thyroid disease in her mother.    Medications and Allergies     Medications  Outpatient Medications Marked as Taking for the 3/28/23 encounter (Office Visit) with HUGO Pereira   Medication Sig Dispense Refill    acetaminophen (TYLENOL) 500 MG tablet Take 500 mg by mouth 2 (two) times daily as needed.      cyclobenzaprine (FLEXERIL) 10 MG tablet Take 10 mg by mouth as needed.      esomeprazole (NEXIUM) 20 MG capsule       ezetimibe (ZETIA) 10 mg tablet Take 1 tablet (10 mg total) by mouth once daily. 30 tablet 1    ferrous sulfate (FEOSOL) 325 mg (65 mg iron) Tab tablet Take 325 mg by mouth daily with breakfast.      guaiFENesin (MUCINEX) 600 mg 12 hr tablet       loratadine (CLARITIN) 10 mg tablet Take 1 tablet (10 mg total) by mouth once daily. One tab daily for allergies, sinus drainage as needed 90 tablet 3    metoprolol succinate 25 mg CSpX 1 capsule.      olmesartan (BENICAR) 5 MG Tab Take 10 mg by mouth.      oxybutynin (DITROPAN-XL) 5 MG TR24       prednisoLONE acetate (PRED FORTE) 1 % DrpS Place 1 drop into both eyes 2 (two) times daily.      predniSONE (DELTASONE) 5 MG tablet Take 5 mg by mouth once daily. prn      RESTASIS 0.05 % ophthalmic emulsion INSTILL ONE DROP IN EACH EYE TWICE DAILY AS DIRECTED         Allergies  Review of patient's allergies indicates:   Allergen Reactions    Iodinated contrast media Shortness Of Breath and Rash    Iodine and iodide containing products Anaphylaxis    Iodine containing multivitamin Anaphylaxis    Cholecalciferol (vitamin d3)     Methotrexate sodium     Shellfish derived     Vicryl [sutures, polyglycolic acid]     Iodine Hives and Rash    Lincomycin Hives and Rash    Penicillins Rash       Physical Examination     Vitals:    03/28/23 1441   BP: 115/67   Pulse: 83   Resp: 20   Temp: 98.4 °F (36.9 °C)   TempSrc: Oral   SpO2: 98%   Weight: 67.4 kg (148 lb 9.6 oz)   Height: 4' 10" (1.473 m)      Physical Exam  Constitutional:       Appearance: She is obese. "   Cardiovascular:      Rate and Rhythm: Normal rate and regular rhythm.      Pulses: Normal pulses.      Heart sounds: Normal heart sounds.   Pulmonary:      Effort: Pulmonary effort is normal.      Breath sounds: Normal breath sounds.   Musculoskeletal:      Right lower leg: No edema.      Left lower leg: No edema.   Feet:      Comments: Protective Sensation (w/ 10 gram monofilament):  Right: Intact  Left: Intact but decreased compared to right      Visual Inspection:  Normal -  Bilateral    Pedal Pulses:   Right: Present  Left: Present    Posterior Tibialis Pulses:   Right:Present  Left: Present     Lymphadenopathy:      Cervical: No cervical adenopathy.   Skin:     General: Skin is warm and dry.   Neurological:      Mental Status: She is alert and oriented to person, place, and time.   Psychiatric:         Mood and Affect: Mood normal.         Behavior: Behavior normal.        Assessment and Plan (including Health Maintenance)     :    Plan:  will check A1C today and  I have copies of her other labs done this year in hand and will scan into system.  Will refer to Dr Hagen, rheumatology,   Consider other meds for diabetes. She is statin intoleratnt and will continue with zetia. Will get eye exam from [facility] and will need to schedule mammogram/tm        Health Maintenance Due   Topic Date Due    Pneumococcal Vaccines (Age 0-64) (1 - PCV) Never done    Eye Exam  Never done    Low Dose Statin  Never done    TETANUS VACCINE  05/14/2017    COVID-19 Vaccine (3 - Booster for Moderna series) 10/08/2021    Foot Exam  02/11/2023    Mammogram  03/14/2023       Problem List Items Addressed This Visit    None  .  There are no diagnoses linked to this encounter.   Health Maintenance Topics with due status: Not Due       Topic Last Completion Date    Colorectal Cancer Screening 09/12/2018    Diabetes Urine Screening 06/21/2022    Lipid Panel 10/11/2022    Hemoglobin A1c 10/11/2022       Procedures     No future  appointments.     No follow-ups on file.     Signature:  HUGO Pereira    Date of encounter: 3/28/23

## 2023-03-29 PROBLEM — K13.0 ANGULAR CHEILITIS: Status: RESOLVED | Noted: 2022-06-21 | Resolved: 2023-03-29

## 2023-03-29 PROBLEM — H65.02 ACUTE SEROUS OTITIS MEDIA OF LEFT EAR: Status: RESOLVED | Noted: 2022-12-21 | Resolved: 2023-03-29

## 2023-03-29 PROBLEM — J34.89 INTERNAL NASAL LESION: Status: RESOLVED | Noted: 2022-06-21 | Resolved: 2023-03-29

## 2023-03-29 NOTE — PATIENT INSTRUCTIONS
will check A1C today and  I have copies of her other labs done this year in hand and will scan into system.  Will refer to Dr Hagen, rheumatology,   Consider other meds for diabetes. She is statin intoleratnt and will continue with zetia. Will get eye exam from [facility] and will need to schedule mammogram/tm

## 2023-04-18 ENCOUNTER — PATIENT OUTREACH (OUTPATIENT)
Dept: ADMINISTRATIVE | Facility: HOSPITAL | Age: 48
End: 2023-04-18

## 2023-05-16 ENCOUNTER — PATIENT OUTREACH (OUTPATIENT)
Dept: ADMINISTRATIVE | Facility: HOSPITAL | Age: 48
End: 2023-05-16

## 2023-05-16 NOTE — PROGRESS NOTES
Health Maintenance Due   Topic Date Due    Pneumococcal Vaccines (Age 0-64) (1 - PCV) Never done    Low Dose Statin  Never done    TETANUS VACCINE  05/14/2017    COVID-19 Vaccine (3 - Booster for Moderna series) 10/08/2021    Diabetes Urine Screening  06/21/2023   HM TOPICS DUE  Uploaded pt mammogram done 05/04/23  Care Everywhere updated

## 2023-06-07 ENCOUNTER — OFFICE VISIT (OUTPATIENT)
Dept: FAMILY MEDICINE | Facility: CLINIC | Age: 48
End: 2023-06-07
Payer: MEDICAID

## 2023-06-07 VITALS
HEIGHT: 58 IN | TEMPERATURE: 98 F | HEART RATE: 85 BPM | DIASTOLIC BLOOD PRESSURE: 70 MMHG | OXYGEN SATURATION: 99 % | SYSTOLIC BLOOD PRESSURE: 120 MMHG | WEIGHT: 143 LBS | BODY MASS INDEX: 30.02 KG/M2 | RESPIRATION RATE: 18 BRPM

## 2023-06-07 DIAGNOSIS — R10.2 PELVIC PAIN: Primary | ICD-10-CM

## 2023-06-07 DIAGNOSIS — R82.90 ABNORMAL FINDING ON URINALYSIS: ICD-10-CM

## 2023-06-07 LAB
ALBUMIN SERPL BCP-MCNC: 3.9 G/DL (ref 3.5–5)
ALBUMIN/GLOB SERPL: 1.1 {RATIO}
ALP SERPL-CCNC: 98 U/L (ref 39–100)
ALT SERPL W P-5'-P-CCNC: 51 U/L (ref 13–56)
ANION GAP SERPL CALCULATED.3IONS-SCNC: 8 MMOL/L (ref 7–16)
AST SERPL W P-5'-P-CCNC: 23 U/L (ref 15–37)
BASOPHILS # BLD AUTO: 0.03 K/UL (ref 0–0.2)
BASOPHILS NFR BLD AUTO: 0.6 % (ref 0–1)
BILIRUB SERPL-MCNC: 0.4 MG/DL (ref ?–1.2)
BILIRUB SERPL-MCNC: NEGATIVE MG/DL
BLOOD URINE, POC: ABNORMAL
BUN SERPL-MCNC: 8 MG/DL (ref 7–18)
BUN/CREAT SERPL: 9 (ref 6–20)
CALCIUM SERPL-MCNC: 9.3 MG/DL (ref 8.5–10.1)
CHLORIDE SERPL-SCNC: 104 MMOL/L (ref 98–107)
CO2 SERPL-SCNC: 29 MMOL/L (ref 21–32)
COLOR, POC UA: YELLOW
CREAT SERPL-MCNC: 0.93 MG/DL (ref 0.55–1.02)
DIFFERENTIAL METHOD BLD: ABNORMAL
EGFR (NO RACE VARIABLE) (RUSH/TITUS): 76 ML/MIN/1.73M2
EOSINOPHIL # BLD AUTO: 0.05 K/UL (ref 0–0.5)
EOSINOPHIL NFR BLD AUTO: 1 % (ref 1–4)
ERYTHROCYTE [DISTWIDTH] IN BLOOD BY AUTOMATED COUNT: 12.4 % (ref 11.5–14.5)
GLOBULIN SER-MCNC: 3.4 G/DL (ref 2–4)
GLUCOSE SERPL-MCNC: 99 MG/DL (ref 74–106)
GLUCOSE UR QL STRIP: NEGATIVE
HCT VFR BLD AUTO: 43.7 % (ref 38–47)
HGB BLD-MCNC: 13.4 G/DL (ref 12–16)
IMM GRANULOCYTES # BLD AUTO: 0 K/UL (ref 0–0.04)
IMM GRANULOCYTES NFR BLD: 0 % (ref 0–0.4)
KETONES UR QL STRIP: NEGATIVE
LEUKOCYTE ESTERASE URINE, POC: NEGATIVE
LYMPHOCYTES # BLD AUTO: 1.86 K/UL (ref 1–4.8)
LYMPHOCYTES NFR BLD AUTO: 35.7 % (ref 27–41)
MCH RBC QN AUTO: 27.5 PG (ref 27–31)
MCHC RBC AUTO-ENTMCNC: 30.7 G/DL (ref 32–36)
MCV RBC AUTO: 89.5 FL (ref 80–96)
MONOCYTES # BLD AUTO: 0.32 K/UL (ref 0–0.8)
MONOCYTES NFR BLD AUTO: 6.1 % (ref 2–6)
MPC BLD CALC-MCNC: 12 FL (ref 9.4–12.4)
NEUTROPHILS # BLD AUTO: 2.95 K/UL (ref 1.8–7.7)
NEUTROPHILS NFR BLD AUTO: 56.6 % (ref 53–65)
NITRITE, POC UA: NEGATIVE
NRBC # BLD AUTO: 0 X10E3/UL
NRBC, AUTO (.00): 0 %
PH, POC UA: 6.5
PLATELET # BLD AUTO: 272 K/UL (ref 150–400)
POTASSIUM SERPL-SCNC: 3.3 MMOL/L (ref 3.5–5.1)
PROT SERPL-MCNC: 7.3 G/DL (ref 6.4–8.2)
PROTEIN, POC: NEGATIVE
RBC # BLD AUTO: 4.88 M/UL (ref 4.2–5.4)
SODIUM SERPL-SCNC: 138 MMOL/L (ref 136–145)
SPECIFIC GRAVITY, POC UA: <=1.005
UROBILINOGEN, POC UA: 0.2
WBC # BLD AUTO: 5.21 K/UL (ref 4.5–11)

## 2023-06-07 PROCEDURE — 85025 COMPLETE CBC W/AUTO DIFF WBC: CPT | Mod: ,,, | Performed by: CLINICAL MEDICAL LABORATORY

## 2023-06-07 PROCEDURE — 99214 PR OFFICE/OUTPT VISIT, EST, LEVL IV, 30-39 MIN: ICD-10-PCS | Mod: ,,, | Performed by: STUDENT IN AN ORGANIZED HEALTH CARE EDUCATION/TRAINING PROGRAM

## 2023-06-07 PROCEDURE — 87591 N.GONORRHOEAE DNA AMP PROB: CPT | Mod: ,,, | Performed by: CLINICAL MEDICAL LABORATORY

## 2023-06-07 PROCEDURE — 81003 URINALYSIS AUTO W/O SCOPE: CPT | Mod: RHCUB | Performed by: STUDENT IN AN ORGANIZED HEALTH CARE EDUCATION/TRAINING PROGRAM

## 2023-06-07 PROCEDURE — 80053 COMPREHENSIVE METABOLIC PANEL: ICD-10-PCS | Mod: ,,, | Performed by: CLINICAL MEDICAL LABORATORY

## 2023-06-07 PROCEDURE — 99214 OFFICE O/P EST MOD 30 MIN: CPT | Mod: ,,, | Performed by: STUDENT IN AN ORGANIZED HEALTH CARE EDUCATION/TRAINING PROGRAM

## 2023-06-07 PROCEDURE — 87491 CHLAMYDIA/GONORRHOEAE(GC), PCR: ICD-10-PCS | Mod: ,,, | Performed by: CLINICAL MEDICAL LABORATORY

## 2023-06-07 PROCEDURE — 87591 CHLAMYDIA/GONORRHOEAE(GC), PCR: ICD-10-PCS | Mod: ,,, | Performed by: CLINICAL MEDICAL LABORATORY

## 2023-06-07 PROCEDURE — 85025 CBC WITH DIFFERENTIAL: ICD-10-PCS | Mod: ,,, | Performed by: CLINICAL MEDICAL LABORATORY

## 2023-06-07 PROCEDURE — 80053 COMPREHEN METABOLIC PANEL: CPT | Mod: ,,, | Performed by: CLINICAL MEDICAL LABORATORY

## 2023-06-07 PROCEDURE — 87491 CHLMYD TRACH DNA AMP PROBE: CPT | Mod: ,,, | Performed by: CLINICAL MEDICAL LABORATORY

## 2023-06-07 RX ORDER — BUSPIRONE HYDROCHLORIDE 5 MG/1
TABLET ORAL
COMMUNITY
Start: 2023-04-04

## 2023-06-07 RX ORDER — ONDANSETRON 4 MG/1
4 TABLET, ORALLY DISINTEGRATING ORAL EVERY 12 HOURS PRN
Qty: 12 TABLET | Refills: 0 | Status: SHIPPED | OUTPATIENT
Start: 2023-06-07 | End: 2023-09-14

## 2023-06-07 RX ORDER — ESCITALOPRAM OXALATE 10 MG/1
10 TABLET ORAL DAILY
COMMUNITY
Start: 2023-05-31

## 2023-06-07 NOTE — PROGRESS NOTES
Progress Note      Abdominal Pain (Pt stated for the last 6 days she has been having abd pain/Pt stated she think she has a cyst on her ovaries/Pt state she took OTC ibuprofen/Pt stated she went to urgent care on Saturday and they done a urine test /) and Nausea      SUBJECTIVE:     Sincere AURA Ivory is a 47 y.o.female who presents to clinic for Abdominal Pain (Pt stated for the last 6 days she has been having abd pain/Pt stated she think she has a cyst on her ovaries/Pt state she took OTC ibuprofen/Pt stated she went to urgent care on Saturday and they done a urine test /) and Nausea    Patient has a history of rheumatoid arthritis, lupus, hypertension, hyperlipidemia, restless legs syndrome, and diabetes.  Patient presents to clinic today for 6 day history of abdominal pain with associated nausea.  Patient notes the pain is focused in the left lower quadrant of her abdomen.  She has been attempting to take ibuprofen and Tylenol for pain control with minimal improvement.  She has associated nausea and has nausea following meals.  Patient denies vomiting.  Patient has been afebrile.  Patient reports bladder pressure.  She denies any other urinary symptoms.  Patient is status post hysterectomy and right oophorectomy.  Patient does have history of uterine and ovarian cysts which is what prompted her surgery.  Patient reports she is been having normal bowel movements.  She did feel constipated over the weekend but took laxatives with resolution.  She last had a normal bowel movement earlier today.  Patient denies vaginal discharge or bleeding.    Past Medical History:  has a past medical history of Anemia, Diabetes mellitus, type 2, Hypertension, Lupus (systemic lupus erythematosus) (2019), Other long term (current) drug therapy, Rheumatoid arthritis (), and Vitamin D deficiency, unspecified.   Past Surgical History:  has a past surgical history that includes Carpal tunnel release;  section ();  "Colonoscopy (09/12/2018); and Hysterectomy.  Family History: family history includes Asthma in her mother; Cancer in her maternal grandmother and paternal grandmother; Diabetes in her maternal grandfather and mother; Heart failure in her mother; Hypertension in her mother; Thyroid disease in her mother.  Social History:  reports that she has never smoked. She has never been exposed to tobacco smoke. She has never used smokeless tobacco. She reports that she does not drink alcohol and does not use drugs.  Allergies:   Review of patient's allergies indicates:   Allergen Reactions    Iodinated contrast media Shortness Of Breath and Rash    Iodine and iodide containing products Anaphylaxis    Iodine containing multivitamin Anaphylaxis    Cholecalciferol (vitamin d3)     Methotrexate sodium     Shellfish derived     Vicryl [sutures, polyglycolic acid]     Iodine Hives and Rash    Lincomycin Hives and Rash    Penicillins Rash     Other reaction(s): Rash, DifficultyBreathing       OBJECTIVE:     Vital Signs   /70 (BP Location: Right arm, Patient Position: Sitting, BP Method: Medium (Manual))   Pulse 85   Temp 98 °F (36.7 °C) (Temporal)   Resp 18   Ht 4' 10" (1.473 m)   Wt 64.9 kg (143 lb)   SpO2 99%   BMI 29.89 kg/m²     Physical Exam  Constitutional:       General: She is not in acute distress.     Appearance: She is not ill-appearing.   HENT:      Head: Normocephalic and atraumatic.   Cardiovascular:      Rate and Rhythm: Normal rate and regular rhythm.      Heart sounds: No murmur heard.    No friction rub. No gallop.   Pulmonary:      Effort: Pulmonary effort is normal. No respiratory distress.      Breath sounds: Normal breath sounds. No wheezing, rhonchi or rales.   Abdominal:      Palpations: Abdomen is soft.      Tenderness: There is abdominal tenderness (Left lower quadrant tenderness with suprapubic tenderness). There is no right CVA tenderness, left CVA tenderness, guarding or rebound. "   Musculoskeletal:         General: Normal range of motion.   Skin:     General: Skin is warm and dry.      Capillary Refill: Capillary refill takes less than 2 seconds.   Neurological:      General: No focal deficit present.      Mental Status: She is alert.       ASSESSMENT/PLAN:     1. Pelvic pain  Patient is having pelvic pain with associated nausea.  Patient's CBC was obtained and showed no leukocytosis.  Patient with unremarkable CMP except for mildly low potassium.  Encourage patient to increase potassium in her diet.  Patient's urinalysis showed trace intact blood.  Microscopy ordered to confirm.  Gonorrhea and chlamydia testing was also obtained and was negative.  Patient has a history of ovarian cysts and possibly has an ovarian cyst contributing to her pain.  We will obtain pelvic ultrasound for further evaluation.Emergency precautions discussed. Patient to FU if symptoms worsen or fail to improve. Patient verbalized understanding.     -     CBC Auto Differential; Future; Expected date: 06/07/2023  -     Comprehensive Metabolic Panel; Future; Expected date: 06/07/2023  -     POCT URINALYSIS W/O SCOPE  -     US Pelvis Complete Non OB; Future; Expected date: 06/07/2023  -     Chlamydia/GC, PCR    2. Abnormal finding on urinalysis  -     Urinalysis, Microscopic    Other orders  -     ondansetron (ZOFRAN-ODT) 4 MG TbDL; Take 1 tablet (4 mg total) by mouth every 12 (twelve) hours as needed (Nausea).  Dispense: 12 tablet; Refill: 0        Follow up if symptoms worsen or fail to improve.      LEE GOMEZ MD  06/08/2023

## 2023-06-07 NOTE — PROGRESS NOTES
Health Maintenance Due   Topic Date Due    Pneumococcal Vaccines (Age 0-64) (1 - PCV) Never done    Low Dose Statin  Never done    TETANUS VACCINE  05/14/2017    COVID-19 Vaccine (3 - Moderna series) 10/08/2021    Diabetes Urine Screening  06/21/2023      Discussed care gaps w/pt  Pt stated she will discuss with PCP on scheduling whats due

## 2023-06-08 ENCOUNTER — TELEPHONE (OUTPATIENT)
Dept: FAMILY MEDICINE | Facility: CLINIC | Age: 48
End: 2023-06-08
Payer: MEDICARE

## 2023-06-08 ENCOUNTER — HOSPITAL ENCOUNTER (OUTPATIENT)
Dept: RADIOLOGY | Facility: HOSPITAL | Age: 48
Discharge: HOME | End: 2023-06-08
Attending: STUDENT IN AN ORGANIZED HEALTH CARE EDUCATION/TRAINING PROGRAM
Payer: MEDICARE

## 2023-06-08 DIAGNOSIS — R10.2 PELVIC PAIN: ICD-10-CM

## 2023-06-08 LAB
BACTERIA #/AREA URNS HPF: ABNORMAL /HPF
CHLAMYDIA BY PCR: NEGATIVE
N. GONORRHOEAE (GC) BY PCR: NEGATIVE
RBC #/AREA URNS HPF: <1 /HPF
WBC #/AREA URNS HPF: 0 /HPF

## 2023-06-08 PROCEDURE — 81001 URINALYSIS, MICROSCOPIC: ICD-10-PCS | Mod: ,,, | Performed by: CLINICAL MEDICAL LABORATORY

## 2023-06-08 PROCEDURE — 81001 URINALYSIS AUTO W/SCOPE: CPT | Mod: ,,, | Performed by: CLINICAL MEDICAL LABORATORY

## 2023-06-08 PROCEDURE — 76856 US EXAM PELVIC COMPLETE: CPT | Mod: TC

## 2023-06-08 RX ORDER — TAMSULOSIN HYDROCHLORIDE 0.4 MG/1
0.4 CAPSULE ORAL DAILY
Qty: 30 CAPSULE | Refills: 0 | Status: SHIPPED | OUTPATIENT
Start: 2023-06-08 | End: 2023-06-14

## 2023-06-09 ENCOUNTER — PATIENT OUTREACH (OUTPATIENT)
Dept: ADMINISTRATIVE | Facility: HOSPITAL | Age: 48
End: 2023-06-09

## 2023-06-09 NOTE — LETTER
AUTHORIZATION FOR RELEASE OF   CONFIDENTIAL INFORMATION    Dear [facility],    We are seeing Jorge Ivory, date of birth 1975, in the clinic at [facility]. HUGO Pereira is the patient's PCP. Jorge Ivory has an outstanding lab/procedure at the time we reviewed her chart. In order to help keep her health information updated, she has authorized us to request the following medical record(s):        (  )  MAMMOGRAM                                      (  )  COLONOSCOPY      (  )  PAP SMEAR                                          (  )  OUTSIDE LAB RESULTS     (  )  DEXA SCAN                                          (  )  EYE EXAM            (  )  FOOT EXAM                                          (  )  ENTIRE RECORD     (  )  OUTSIDE IMMUNIZATIONS                 ( X )  Urology Report with Dr. Samuel Dowd         Please fax records to [facility] Coordinator, Nyla Tim, [phone].     If you have any questions, please contact [phone].          Patient Name: Jorge Ivory  : 1975  Patient Phone #: [phone]

## 2023-06-14 ENCOUNTER — PATIENT OUTREACH (OUTPATIENT)
Dept: ADMINISTRATIVE | Facility: HOSPITAL | Age: 48
End: 2023-06-14

## 2023-06-14 ENCOUNTER — OFFICE VISIT (OUTPATIENT)
Dept: FAMILY MEDICINE | Facility: CLINIC | Age: 48
End: 2023-06-14
Attending: STUDENT IN AN ORGANIZED HEALTH CARE EDUCATION/TRAINING PROGRAM
Payer: MEDICAID

## 2023-06-14 ENCOUNTER — TELEPHONE (OUTPATIENT)
Dept: FAMILY MEDICINE | Facility: CLINIC | Age: 48
End: 2023-06-14
Payer: MEDICARE

## 2023-06-14 ENCOUNTER — HOSPITAL ENCOUNTER (OUTPATIENT)
Dept: RADIOLOGY | Facility: HOSPITAL | Age: 48
Discharge: HOME | End: 2023-06-14
Attending: STUDENT IN AN ORGANIZED HEALTH CARE EDUCATION/TRAINING PROGRAM
Payer: MEDICARE

## 2023-06-14 VITALS
TEMPERATURE: 98 F | OXYGEN SATURATION: 98 % | SYSTOLIC BLOOD PRESSURE: 123 MMHG | HEIGHT: 58 IN | HEART RATE: 66 BPM | RESPIRATION RATE: 18 BRPM | BODY MASS INDEX: 30.23 KG/M2 | DIASTOLIC BLOOD PRESSURE: 85 MMHG | WEIGHT: 144 LBS

## 2023-06-14 DIAGNOSIS — N30.00 ACUTE CYSTITIS WITHOUT HEMATURIA: Primary | ICD-10-CM

## 2023-06-14 DIAGNOSIS — R33.9 BLADDER RETENTION OF URINE: ICD-10-CM

## 2023-06-14 LAB
BILIRUB SERPL-MCNC: NEGATIVE MG/DL
BLOOD URINE, POC: NEGATIVE
COLOR, POC UA: YELLOW
GLUCOSE UR QL STRIP: NEGATIVE
KETONES UR QL STRIP: NEGATIVE
LEUKOCYTE ESTERASE URINE, POC: ABNORMAL
NITRITE, POC UA: NEGATIVE
PH, POC UA: 5
PROTEIN, POC: NEGATIVE
SPECIFIC GRAVITY, POC UA: 1.02
UROBILINOGEN, POC UA: 0.2

## 2023-06-14 PROCEDURE — 4010F ACE/ARB THERAPY RXD/TAKEN: CPT | Mod: CPTII,,, | Performed by: STUDENT IN AN ORGANIZED HEALTH CARE EDUCATION/TRAINING PROGRAM

## 2023-06-14 PROCEDURE — 3079F PR MOST RECENT DIASTOLIC BLOOD PRESSURE 80-89 MM HG: ICD-10-PCS | Mod: CPTII,,, | Performed by: STUDENT IN AN ORGANIZED HEALTH CARE EDUCATION/TRAINING PROGRAM

## 2023-06-14 PROCEDURE — 1160F PR REVIEW ALL MEDS BY PRESCRIBER/CLIN PHARMACIST DOCUMENTED: ICD-10-PCS | Mod: CPTII,,, | Performed by: STUDENT IN AN ORGANIZED HEALTH CARE EDUCATION/TRAINING PROGRAM

## 2023-06-14 PROCEDURE — 3074F PR MOST RECENT SYSTOLIC BLOOD PRESSURE < 130 MM HG: ICD-10-PCS | Mod: CPTII,,, | Performed by: STUDENT IN AN ORGANIZED HEALTH CARE EDUCATION/TRAINING PROGRAM

## 2023-06-14 PROCEDURE — 1160F RVW MEDS BY RX/DR IN RCRD: CPT | Mod: CPTII,,, | Performed by: STUDENT IN AN ORGANIZED HEALTH CARE EDUCATION/TRAINING PROGRAM

## 2023-06-14 PROCEDURE — 76770 US EXAM ABDO BACK WALL COMP: CPT | Mod: TC

## 2023-06-14 PROCEDURE — 3008F PR BODY MASS INDEX (BMI) DOCUMENTED: ICD-10-PCS | Mod: CPTII,,, | Performed by: STUDENT IN AN ORGANIZED HEALTH CARE EDUCATION/TRAINING PROGRAM

## 2023-06-14 PROCEDURE — 1159F PR MEDICATION LIST DOCUMENTED IN MEDICAL RECORD: ICD-10-PCS | Mod: CPTII,,, | Performed by: STUDENT IN AN ORGANIZED HEALTH CARE EDUCATION/TRAINING PROGRAM

## 2023-06-14 PROCEDURE — 3079F DIAST BP 80-89 MM HG: CPT | Mod: CPTII,,, | Performed by: STUDENT IN AN ORGANIZED HEALTH CARE EDUCATION/TRAINING PROGRAM

## 2023-06-14 PROCEDURE — 99213 OFFICE O/P EST LOW 20 MIN: CPT | Mod: ,,, | Performed by: STUDENT IN AN ORGANIZED HEALTH CARE EDUCATION/TRAINING PROGRAM

## 2023-06-14 PROCEDURE — 4010F PR ACE/ARB THEARPY RXD/TAKEN: ICD-10-PCS | Mod: CPTII,,, | Performed by: STUDENT IN AN ORGANIZED HEALTH CARE EDUCATION/TRAINING PROGRAM

## 2023-06-14 PROCEDURE — 99213 PR OFFICE/OUTPT VISIT, EST, LEVL III, 20-29 MIN: ICD-10-PCS | Mod: ,,, | Performed by: STUDENT IN AN ORGANIZED HEALTH CARE EDUCATION/TRAINING PROGRAM

## 2023-06-14 PROCEDURE — 3008F BODY MASS INDEX DOCD: CPT | Mod: CPTII,,, | Performed by: STUDENT IN AN ORGANIZED HEALTH CARE EDUCATION/TRAINING PROGRAM

## 2023-06-14 PROCEDURE — 3044F PR MOST RECENT HEMOGLOBIN A1C LEVEL <7.0%: ICD-10-PCS | Mod: CPTII,,, | Performed by: STUDENT IN AN ORGANIZED HEALTH CARE EDUCATION/TRAINING PROGRAM

## 2023-06-14 PROCEDURE — 81003 URINALYSIS AUTO W/O SCOPE: CPT | Mod: RHCUB | Performed by: STUDENT IN AN ORGANIZED HEALTH CARE EDUCATION/TRAINING PROGRAM

## 2023-06-14 PROCEDURE — 1159F MED LIST DOCD IN RCRD: CPT | Mod: CPTII,,, | Performed by: STUDENT IN AN ORGANIZED HEALTH CARE EDUCATION/TRAINING PROGRAM

## 2023-06-14 PROCEDURE — 3044F HG A1C LEVEL LT 7.0%: CPT | Mod: CPTII,,, | Performed by: STUDENT IN AN ORGANIZED HEALTH CARE EDUCATION/TRAINING PROGRAM

## 2023-06-14 PROCEDURE — 3074F SYST BP LT 130 MM HG: CPT | Mod: CPTII,,, | Performed by: STUDENT IN AN ORGANIZED HEALTH CARE EDUCATION/TRAINING PROGRAM

## 2023-06-14 RX ORDER — TAMSULOSIN HYDROCHLORIDE 0.4 MG/1
0.4 CAPSULE ORAL DAILY
Qty: 90 CAPSULE | Refills: 1 | Status: SHIPPED | OUTPATIENT
Start: 2023-06-14 | End: 2023-09-14 | Stop reason: SDUPTHER

## 2023-06-14 RX ORDER — METOPROLOL SUCCINATE 25 MG/1
12.5 TABLET, EXTENDED RELEASE ORAL DAILY
COMMUNITY
Start: 2023-05-08

## 2023-06-14 RX ORDER — NITROFURANTOIN 25; 75 MG/1; MG/1
100 CAPSULE ORAL 2 TIMES DAILY
Qty: 10 CAPSULE | Refills: 0 | Status: SHIPPED | OUTPATIENT
Start: 2023-06-14 | End: 2023-06-19

## 2023-06-14 NOTE — TELEPHONE ENCOUNTER
Contacted pt about taking a statin. Pt reports that she has been unable to take a statin and her heart doctor is attempting to get a PA on new statin, ZypitaMag.

## 2023-06-14 NOTE — PROGRESS NOTES
06/14/2023   --Chart accessed for: Care Gaps  --Care Gaps addressed: Immunizations  Outreach made to patient via N/A . (Success) (Left Message) (Unavailable)   Care Everywhere updates requested and reviewed.  Media reports reviewed.  [facility] and [facility] reviewed.  Immunization Database ([employer]) reviewed. Vaccinations uploaded: N/A due to COVID-19 vaccine already uploaded  Health Maintenance Due   Topic Date Due    Pneumococcal Vaccines (Age 0-64) (1 - PCV) Never done    Low Dose Statin  Never done    TETANUS VACCINE  05/14/2017    COVID-19 Vaccine (3 - Moderna series) 10/08/2021    Diabetes Urine Screening  06/21/2023

## 2023-06-14 NOTE — PROGRESS NOTES
Progress Note      Follow-up (Pt was last seen on  for abd pain/Pt stated she is doing better now/Pt stated the flomax helped her out a lot.)      SUBJECTIVE:     Sincere AURA Ivory is a 47 y.o.female who presents to clinic for Follow-up (Pt was last seen on  for abd pain/Pt stated she is doing better now/Pt stated the flomax helped her out a lot.)    Patient presents to clinic today for dysuria.  She notes onset over the past few days.  Patient was evaluated on 23. Patient was having suprapubic abdominal pain at that time. Blood work was performed and was reassuring. UA was normal. Patient had pelvic US which was unremarkable. She reported she did not feel as if she was emptying her bladder completely. She has a history of urinary retention and has seen urology in the past. She was told it was 2/2 her lupus. Patient was given a prescription for flomax. She notes improvement of her abdominal pain but is now having some mild dysuria.  Patient notes that she does take bath salts liquids.  Patient denies associated nausea or vomiting.  She has been afebrile.  She denies flank pain.  Patient with mild suprapubic pain on exam.  Patient would like to avoid seeing urologist if possible.  She is amenable to having an ultrasound to ensure she is not developing hydronephrosis secondary to her urine retention.  Patient is also having associated fatigue and joint pain. She has lupus and RA. However, she is currently in between rheumatologists and is not on any treatment. Patient has an appointment with Rheum tomorrow.     Past Medical History:  has a past medical history of Anemia, Diabetes mellitus, type 2, Hypertension, Lupus (systemic lupus erythematosus) (2019), Other long term (current) drug therapy, Rheumatoid arthritis (), and Vitamin D deficiency, unspecified.   Past Surgical History:  has a past surgical history that includes Carpal tunnel release;  section (); Colonoscopy  (09/12/2018); and Hysterectomy.  Family History: family history includes Asthma in her mother; Cancer in her maternal grandmother and paternal grandmother; Diabetes in her maternal grandfather and mother; Heart failure in her mother; Hypertension in her mother; Thyroid disease in her mother.  Social History:  reports that she has never smoked. She has never been exposed to tobacco smoke. She has never used smokeless tobacco. She reports that she does not drink alcohol and does not use drugs.  Allergies:   Review of patient's allergies indicates:   Allergen Reactions    Iodinated contrast media Shortness Of Breath and Rash    Iodine and iodide containing products Anaphylaxis    Iodine containing multivitamin Anaphylaxis    Cholecalciferol (vitamin d3)     Lisinopril Other (See Comments)    Methotrexate sodium     Shellfish derived     Vicryl [sutures, polyglycolic acid]     Iodine Hives and Rash    Lincomycin Hives and Rash    Penicillins Rash     Other reaction(s): Rash, DifficultyBreathing         Current Outpatient Medications:     acetaminophen (TYLENOL) 500 MG tablet, Take 500 mg by mouth 2 (two) times daily as needed., Disp: , Rfl:     busPIRone (BUSPAR) 5 MG Tab, Take 1 tablet by mouth three times a day as needed for anxiety, Disp: , Rfl:     cyclobenzaprine (FLEXERIL) 10 MG tablet, Take 10 mg by mouth as needed., Disp: , Rfl:     EScitalopram oxalate (LEXAPRO) 10 MG tablet, Take 10 mg by mouth., Disp: , Rfl:     esomeprazole (NEXIUM) 20 MG capsule, , Disp: , Rfl:     ezetimibe (ZETIA) 10 mg tablet, Take 1 tablet (10 mg total) by mouth once daily., Disp: 30 tablet, Rfl: 1    ferrous sulfate (FEOSOL) 325 mg (65 mg iron) Tab tablet, Take 325 mg by mouth daily with breakfast., Disp: , Rfl:     guaiFENesin (MUCINEX) 600 mg 12 hr tablet, , Disp: , Rfl:     loratadine (CLARITIN) 10 mg tablet, Take 1 tablet (10 mg total) by mouth once daily. One tab daily for allergies, sinus drainage as needed, Disp: 90 tablet, Rfl:  "3    meloxicam (MOBIC) 7.5 MG tablet, Take 1 tablet (7.5 mg total) by mouth once daily., Disp: 30 tablet, Rfl: 0    metoprolol succinate (TOPROL-XL) 25 MG 24 hr tablet, Take 25 mg by mouth., Disp: , Rfl:     metoprolol succinate 25 mg CSpX, 1 capsule., Disp: , Rfl:     olmesartan (BENICAR) 5 MG Tab, Take 10 mg by mouth., Disp: , Rfl:     ondansetron (ZOFRAN-ODT) 4 MG TbDL, Take 1 tablet (4 mg total) by mouth every 12 (twelve) hours as needed (Nausea)., Disp: 12 tablet, Rfl: 0    prednisoLONE acetate (PRED FORTE) 1 % DrpS, Place 1 drop into both eyes 2 (two) times daily., Disp: , Rfl:     predniSONE (DELTASONE) 5 MG tablet, Take 5 mg by mouth once daily. prn, Disp: , Rfl:     RESTASIS 0.05 % ophthalmic emulsion, INSTILL ONE DROP IN EACH EYE TWICE DAILY AS DIRECTED, Disp: , Rfl:     nitrofurantoin, macrocrystal-monohydrate, (MACROBID) 100 MG capsule, Take 1 capsule (100 mg total) by mouth 2 (two) times daily. for 5 days, Disp: 10 capsule, Rfl: 0    tamsulosin (FLOMAX) 0.4 mg Cap, Take 1 capsule (0.4 mg total) by mouth once daily., Disp: 90 capsule, Rfl: 1   OBJECTIVE:     Vital Signs   /85 (BP Location: Left arm, Patient Position: Sitting, BP Method: Medium (Automatic))   Pulse 66   Temp 97.5 °F (36.4 °C) (Temporal)   Resp 18   Ht 4' 10" (1.473 m)   Wt 65.3 kg (144 lb)   SpO2 98%   BMI 30.10 kg/m²     Physical Exam  Constitutional:       General: She is not in acute distress.     Appearance: Normal appearance. She is not ill-appearing, toxic-appearing or diaphoretic.   HENT:      Head: Normocephalic and atraumatic.      Right Ear: Tympanic membrane normal.      Left Ear: Tympanic membrane normal.      Mouth/Throat:      Mouth: Mucous membranes are moist.      Pharynx: No oropharyngeal exudate or posterior oropharyngeal erythema.   Eyes:      Extraocular Movements: Extraocular movements intact.      Pupils: Pupils are equal, round, and reactive to light.   Cardiovascular:      Rate and Rhythm: Normal rate " and regular rhythm.      Pulses: Normal pulses.      Heart sounds: Normal heart sounds. No murmur heard.    No friction rub. No gallop.   Pulmonary:      Effort: Pulmonary effort is normal. No respiratory distress.      Breath sounds: No wheezing, rhonchi or rales.   Abdominal:      General: Abdomen is flat.      Palpations: Abdomen is soft.      Tenderness: There is abdominal tenderness (Mild suprapubic). There is no guarding or rebound.   Musculoskeletal:         General: Normal range of motion.      Cervical back: Normal range of motion.   Skin:     General: Skin is warm and dry.      Capillary Refill: Capillary refill takes less than 2 seconds.   Neurological:      General: No focal deficit present.      Mental Status: She is alert.   Psychiatric:         Mood and Affect: Mood normal.         Behavior: Behavior normal.       ASSESSMENT/PLAN:     1. Acute cystitis without hematuria  Assessment & Plan:  Patient's UA is concerning for UTI.  We will obtain culture.  We will treat with Macrobid.  Counseled on emergency precautions.  Discussed prevention techniques.  UTI may be secondary to recent bladder retention that has improved with initiation of Flomax. Will continue flomax at this time.     Orders:  -     POCT URINALYSIS W/O SCOPE  -     nitrofurantoin, macrocrystal-monohydrate, (MACROBID) 100 MG capsule; Take 1 capsule (100 mg total) by mouth 2 (two) times daily. for 5 days  Dispense: 10 capsule; Refill: 0  -     Urine culture    2. Bladder retention of urine  Assessment & Plan:  Patient has been having bladder retention.  Patient with improvement symptoms with initiation of Flomax.  Patient was previously followed by Urology got her retention was likely secondary to her lupus.  Patient would like to avoid seeing urologist again in the future if possible.  Patient is amenable to having renal/bladder ultrasound to ensure she is not developing hydronephrosis 2/2  her retention. Will continue Flomax at this time.      Orders:  -     US Retroperitoneal Complete; Future; Expected date: 06/14/2023  -     tamsulosin (FLOMAX) 0.4 mg Cap; Take 1 capsule (0.4 mg total) by mouth once daily.  Dispense: 90 capsule; Refill: 1        Follow up in about 3 months (around 9/14/2023) for Recheck .      LEE GOMEZ MD  06/14/2023

## 2023-06-14 NOTE — ASSESSMENT & PLAN NOTE
Patient's UA is concerning for UTI.  We will obtain culture.  We will treat with Macrobid.  Counseled on emergency precautions.  Discussed prevention techniques.  UTI may be secondary to recent bladder retention that has improved with initiation of Flomax. Will continue flomax at this time.

## 2023-06-14 NOTE — ASSESSMENT & PLAN NOTE
Patient has been having bladder retention.  Patient with improvement symptoms with initiation of Flomax.  Patient was previously followed by Urology got her retention was likely secondary to her lupus.  Patient would like to avoid seeing urologist again in the future if possible.  Patient is amenable to having renal/bladder ultrasound to ensure she is not developing hydronephrosis 2/2  her retention. Will continue Flomax at this time.

## 2023-06-15 ENCOUNTER — PATIENT MESSAGE (OUTPATIENT)
Dept: FAMILY MEDICINE | Facility: CLINIC | Age: 48
End: 2023-06-15
Payer: MEDICARE

## 2023-06-27 ENCOUNTER — PATIENT OUTREACH (OUTPATIENT)
Dept: ADMINISTRATIVE | Facility: HOSPITAL | Age: 48
End: 2023-06-27

## 2023-07-06 ENCOUNTER — PATIENT OUTREACH (OUTPATIENT)
Dept: ADMINISTRATIVE | Facility: HOSPITAL | Age: 48
End: 2023-07-06

## 2023-07-06 NOTE — PROGRESS NOTES
Pt stated she goes to [facility] in [place], but the doctor's name is Dr Denis Haque? He did xrays and lab but feels my recent pain might be generated from my spine and/or neuro. Saw him on 6/15/23 and lab and xray reports were not in yet. Dr. Arias stated w the pt she will our Care Coordinator to request the records. Nurse Elisabeth KHAN stated to the pt as well.     Documents have been requested

## 2023-07-06 NOTE — LETTER
AUTHORIZATION FOR RELEASE OF   CONFIDENTIAL INFORMATION    Dear [facility] Medical Records,    We are seeing Jorge Ivory, date of birth 1975, in the clinic at [facility]. HUGO Pereira is the patient's PCP. Jorge Ivory has an outstanding lab/procedure at the time we reviewed her chart. In order to help keep her health information updated, she has authorized us to request the following medical record(s):        (  )  MAMMOGRAM                                      (  )  COLONOSCOPY      (  )  PAP SMEAR                                          (  )  OUTSIDE LAB RESULTS     (  )  DEXA SCAN                                          (  )  EYE EXAM            (  )  FOOT EXAM                                          (X)  ENTIRE RECORD     (  )  OUTSIDE IMMUNIZATIONS                         Please fax records to [facility] Care Coordinator, Nyla Tim, [phone].     If you have any questions, please contact [phone].          Patient Name: Jorge Ivory  : 1975  Patient Phone #: [phone]

## 2023-07-11 ENCOUNTER — PATIENT OUTREACH (OUTPATIENT)
Dept: ADMINISTRATIVE | Facility: HOSPITAL | Age: 48
End: 2023-07-11

## 2023-08-09 ENCOUNTER — PATIENT MESSAGE (OUTPATIENT)
Dept: RHEUMATOLOGY | Facility: CLINIC | Age: 48
End: 2023-08-09
Payer: MEDICARE

## 2023-08-10 ENCOUNTER — OFFICE VISIT (OUTPATIENT)
Dept: FAMILY MEDICINE | Facility: CLINIC | Age: 48
End: 2023-08-10
Payer: MEDICARE

## 2023-08-10 VITALS
SYSTOLIC BLOOD PRESSURE: 127 MMHG | RESPIRATION RATE: 16 BRPM | HEIGHT: 58 IN | WEIGHT: 141.63 LBS | OXYGEN SATURATION: 99 % | TEMPERATURE: 99 F | DIASTOLIC BLOOD PRESSURE: 84 MMHG | HEART RATE: 63 BPM | BODY MASS INDEX: 29.73 KG/M2

## 2023-08-10 DIAGNOSIS — R33.9 URINARY RETENTION: Chronic | ICD-10-CM

## 2023-08-10 DIAGNOSIS — E78.00 PURE HYPERCHOLESTEROLEMIA: Chronic | ICD-10-CM

## 2023-08-10 DIAGNOSIS — M54.42 CHRONIC BILATERAL LOW BACK PAIN WITH LEFT-SIDED SCIATICA: Chronic | ICD-10-CM

## 2023-08-10 DIAGNOSIS — E11.9 DIABETES MELLITUS WITHOUT COMPLICATION: Primary | Chronic | ICD-10-CM

## 2023-08-10 DIAGNOSIS — E78.5 HYPERLIPIDEMIA, UNSPECIFIED HYPERLIPIDEMIA TYPE: Chronic | ICD-10-CM

## 2023-08-10 DIAGNOSIS — G89.29 CHRONIC BILATERAL LOW BACK PAIN WITH LEFT-SIDED SCIATICA: Chronic | ICD-10-CM

## 2023-08-10 DIAGNOSIS — E55.9 VITAMIN D DEFICIENCY: ICD-10-CM

## 2023-08-10 DIAGNOSIS — I10 ESSENTIAL HYPERTENSION, BENIGN: Chronic | ICD-10-CM

## 2023-08-10 PROBLEM — M79.674 GREAT TOE PAIN, RIGHT: Status: RESOLVED | Noted: 2022-12-21 | Resolved: 2023-08-10

## 2023-08-10 LAB
25(OH)D3 SERPL-MCNC: 21.3 NG/ML
ANION GAP SERPL CALCULATED.3IONS-SCNC: 7 MMOL/L (ref 7–16)
BUN SERPL-MCNC: 7 MG/DL (ref 7–18)
BUN/CREAT SERPL: 9 (ref 6–20)
CALCIUM SERPL-MCNC: 9.9 MG/DL (ref 8.5–10.1)
CHLORIDE SERPL-SCNC: 108 MMOL/L (ref 98–107)
CO2 SERPL-SCNC: 30 MMOL/L (ref 21–32)
CREAT SERPL-MCNC: 0.77 MG/DL (ref 0.55–1.02)
CREAT UR-MCNC: 148 MG/DL (ref 28–219)
EGFR (NO RACE VARIABLE) (RUSH/TITUS): 95 ML/MIN/1.73M2
EST. AVERAGE GLUCOSE BLD GHB EST-MCNC: 110 MG/DL
GLUCOSE SERPL-MCNC: 98 MG/DL (ref 74–106)
HBA1C MFR BLD HPLC: 5.9 % (ref 4.5–6.6)
MICROALBUMIN UR-MCNC: 0.6 MG/DL (ref 0–2.8)
MICROALBUMIN/CREAT RATIO PNL UR: 4.1 MG/G (ref 0–30)
POTASSIUM SERPL-SCNC: 3.8 MMOL/L (ref 3.5–5.1)
SODIUM SERPL-SCNC: 141 MMOL/L (ref 136–145)

## 2023-08-10 PROCEDURE — 83036 HEMOGLOBIN A1C: ICD-10-PCS | Mod: ,,, | Performed by: CLINICAL MEDICAL LABORATORY

## 2023-08-10 PROCEDURE — 99214 OFFICE O/P EST MOD 30 MIN: CPT | Mod: ,,, | Performed by: NURSE PRACTITIONER

## 2023-08-10 PROCEDURE — 82043 MICROALBUMIN / CREATININE RATIO URINE: ICD-10-PCS | Mod: ,,, | Performed by: CLINICAL MEDICAL LABORATORY

## 2023-08-10 PROCEDURE — 99214 PR OFFICE/OUTPT VISIT, EST, LEVL IV, 30-39 MIN: ICD-10-PCS | Mod: ,,, | Performed by: NURSE PRACTITIONER

## 2023-08-10 PROCEDURE — 80048 BASIC METABOLIC PNL TOTAL CA: CPT | Mod: ,,, | Performed by: CLINICAL MEDICAL LABORATORY

## 2023-08-10 PROCEDURE — 82306 VITAMIN D 25 HYDROXY: CPT | Mod: ,,, | Performed by: CLINICAL MEDICAL LABORATORY

## 2023-08-10 PROCEDURE — 83036 HEMOGLOBIN GLYCOSYLATED A1C: CPT | Mod: ,,, | Performed by: CLINICAL MEDICAL LABORATORY

## 2023-08-10 PROCEDURE — 82570 ASSAY OF URINE CREATININE: CPT | Mod: ,,, | Performed by: CLINICAL MEDICAL LABORATORY

## 2023-08-10 PROCEDURE — 80048 BASIC METABOLIC PANEL: ICD-10-PCS | Mod: ,,, | Performed by: CLINICAL MEDICAL LABORATORY

## 2023-08-10 PROCEDURE — 82570 MICROALBUMIN / CREATININE RATIO URINE: ICD-10-PCS | Mod: ,,, | Performed by: CLINICAL MEDICAL LABORATORY

## 2023-08-10 PROCEDURE — 82043 UR ALBUMIN QUANTITATIVE: CPT | Mod: ,,, | Performed by: CLINICAL MEDICAL LABORATORY

## 2023-08-10 PROCEDURE — 82306 VITAMIN D: ICD-10-PCS | Mod: ,,, | Performed by: CLINICAL MEDICAL LABORATORY

## 2023-08-10 RX ORDER — OXYBUTYNIN CHLORIDE 5 MG/1
TABLET, EXTENDED RELEASE ORAL
COMMUNITY
End: 2023-08-10 | Stop reason: ALTCHOICE

## 2023-08-10 RX ORDER — EZETIMIBE 10 MG/1
10 TABLET ORAL DAILY
Qty: 90 TABLET | Refills: 3 | Status: SHIPPED | OUTPATIENT
Start: 2023-08-10 | End: 2024-08-09

## 2023-08-10 RX ORDER — CYCLOBENZAPRINE HCL 10 MG
10 TABLET ORAL
Qty: 60 TABLET | Refills: 1 | Status: SHIPPED | OUTPATIENT
Start: 2023-08-10

## 2023-08-10 NOTE — PROGRESS NOTES
HUGO Pereira   [facility]  [address]   [place] [address]     PATIENT NAME: Jorge Ivory  : 1975  DATE: 8/10/23  MRN: [id number]      Billing Provider: HUGO Pereira  Level of Service:   Patient PCP Information       Provider PCP Type    HUGO Pereira General            Reason for Visit / Chief Complaint: Diabetes (Check A1c) and Disabled parking         History of Present Illness / Problem Focused Workflow     Jorge Ivory is a 47 y.o. female presents to the clinic  for check up and refills.  She has recently been approved for disability based on her lupus, arthritis.  She needs a handicapped parking tag.   She is diabetic  and  is not checking her glucose on a regular basis with most recent check was 139 2 hours after eating.  No hypoglycemia.   She is currently off prednisone and is following with a new  rheumatologist in [place], Dr Denis Haque and is currently taking meloxicam which helps fairly well but has experienced some GI upset, constipation.   She has verified with nephrologist, Dr Corona,  that is ok to take meloxicam for now, numbers are good.  She has found that she is not emptying her blader well and is currently  taking Flomax.   She has had echo with mitral valve regurgitation and aortic regurg and stress test with questionable myocardial thickening--Dr Mistry.  She has linq monitor in place.  She has history of uveitis and last eye exam was approx 2023 with Dr Noe. She is having some problems with nasal congestion due to cpap with lack of   humidification and will have  in house sleep study in October.      Review of Systems     Review of Systems   Constitutional:  Negative for fatigue.   HENT:  Positive for nasal congestion. Negative for sore throat.    Respiratory:  Positive for shortness of breath. Negative for cough and chest tightness.    Cardiovascular:  Positive for palpitations (occasionally). Negative for chest pain  and leg swelling.   Gastrointestinal:  Positive for abdominal pain and constipation. Negative for nausea, vomiting and reflux.   Genitourinary:         Incomplete emptying of bladder.   Neurological:  Negative for weakness and memory loss.   Psychiatric/Behavioral:  Negative for confusion and sleep disturbance.         Medical / Social / Family History     Past Medical History:   Diagnosis Date    Anemia     Diabetes mellitus, type 2     Hypertension     Lupus (systemic lupus erythematosus) 2019    Other long term (current) drug therapy     Rheumatoid arthritis 2015    Vitamin D deficiency, unspecified        Past Surgical History:   Procedure Laterality Date    CARPAL TUNNEL RELEASE       SECTION      COLONOSCOPY  2018    MARCO A--Dr. Isaac    HYSTERECTOMY         Social History  Ms.  reports that she has never smoked. She has never been exposed to tobacco smoke. She has never used smokeless tobacco. She reports that she does not drink alcohol and does not use drugs.    Family History  Ms.'s family history includes Asthma in her mother; Cancer in her maternal grandmother and paternal grandmother; Diabetes in her maternal grandfather and mother; Heart failure in her mother; Hypertension in her mother; Thyroid disease in her mother.    Medications and Allergies     Medications  Outpatient Medications Marked as Taking for the 8/10/23 encounter (Office Visit) with Angie Underwood FNP   Medication Sig Dispense Refill    acetaminophen (TYLENOL) 500 MG tablet Take 500 mg by mouth 2 (two) times daily as needed.      busPIRone (BUSPAR) 5 MG Tab Take 1 tablet by mouth three times a day as needed for anxiety      cyclobenzaprine (FLEXERIL) 10 MG tablet Take 10 mg by mouth as needed.      EScitalopram oxalate (LEXAPRO) 10 MG tablet Take 10 mg by mouth.      esomeprazole (NEXIUM) 20 MG capsule       ezetimibe (ZETIA) 10 mg tablet Take 1 tablet (10 mg total) by mouth once daily. 30 tablet 1    ferrous sulfate (FEOSOL)  "325 mg (65 mg iron) Tab tablet Take 325 mg by mouth daily with breakfast.      guaiFENesin (MUCINEX) 600 mg 12 hr tablet       loratadine (CLARITIN) 10 mg tablet Take 1 tablet (10 mg total) by mouth once daily. One tab daily for allergies, sinus drainage as needed 90 tablet 3    metoprolol succinate (TOPROL-XL) 25 MG 24 hr tablet Take 25 mg by mouth.      olmesartan (BENICAR) 5 MG Tab Take 10 mg by mouth.      ondansetron (ZOFRAN-ODT) 4 MG TbDL Take 1 tablet (4 mg total) by mouth every 12 (twelve) hours as needed (Nausea). 12 tablet 0    prednisoLONE acetate (PRED FORTE) 1 % DrpS Place 1 drop into both eyes 2 (two) times daily.      RESTASIS 0.05 % ophthalmic emulsion INSTILL ONE DROP IN EACH EYE TWICE DAILY AS DIRECTED      tamsulosin (FLOMAX) 0.4 mg Cap Take 1 capsule (0.4 mg total) by mouth once daily. 90 capsule 1       Allergies  Review of patient's allergies indicates:   Allergen Reactions    Iodinated contrast media Shortness Of Breath and Rash    Iodine and iodide containing products Anaphylaxis    Iodine containing multivitamin Anaphylaxis    Cholecalciferol (vitamin d3)     Lisinopril Other (See Comments)    Methotrexate sodium     Shellfish derived     Vicryl [sutures, polyglycolic acid]     Iodine Hives and Rash    Lincomycin Hives and Rash    Penicillins Rash     Other reaction(s): Rash, DifficultyBreathing       Physical Examination     Vitals:    08/10/23 1035   BP: 127/84   Pulse: 63   Resp: 16   Temp: 98.5 °F (36.9 °C)   TempSrc: Oral   SpO2: 99%   Weight: 64.2 kg (141 lb 9.6 oz)   Height: 4' 10" (1.473 m)      Physical Exam  Constitutional:       Appearance: Normal appearance.   Cardiovascular:      Rate and Rhythm: Normal rate and regular rhythm.   Pulmonary:      Effort: Pulmonary effort is normal.      Breath sounds: Normal breath sounds.   Musculoskeletal:      Right lower leg: No edema.      Left lower leg: No edema.   Lymphadenopathy:      Cervical: No cervical adenopathy.   Skin:     " General: Skin is warm and dry.   Neurological:      Mental Status: She is alert and oriented to person, place, and time.          Assessment and Plan (including Health Maintenance)     :    Plan:  discussed with patient  need to obtain covid bivalent vaccine; she had pneumonia vaccine in 2015.  Flu Shot next month.           Health Maintenance Due   Topic Date Due    Pneumococcal Vaccines (Age 0-64) (1 - PCV) Never done    TETANUS VACCINE  05/14/2017    COVID-19 Vaccine (3 - Moderna series) 10/08/2021    Diabetes Urine Screening  06/21/2023       Problem List Items Addressed This Visit          Cardiac/Vascular    Essential hypertension, benign (Chronic)    Hyperlipidemia (Chronic)       Endocrine    Diabetes mellitus without complication - Primary   .  Diabetes mellitus without complication    Pure hypercholesterolemia    Essential hypertension, benign    Hyperlipidemia, unspecified hyperlipidemia type       Health Maintenance Topics with due status: Not Due       Topic Last Completion Date    Colorectal Cancer Screening 09/12/2018    Lipid Panel 10/11/2022    Influenza Vaccine 12/21/2022    Eye Exam 03/03/2023    Foot Exam 03/28/2023    Hemoglobin A1c 03/28/2023    Mammogram 05/04/2023       Procedures     Future Appointments   Date Time Provider Department Center   9/14/2023  8:00 AM Karolina Arias MD [facility] [place]   11/9/2023 10:00 AM [facility] [place]        No follow-ups on file.     Signature:  HUGO Pereira    Date of encounter: 8/10/23

## 2023-08-11 NOTE — PROGRESS NOTES
Sincere,  Your electrolytes and kidney function are all normal,  A1c  is 5.9 and stable.  Urine test is normal, Vitamin D is 21.3 and need to  take Vitamin D 1000 units daily if not already doing so./tm

## 2023-08-14 ENCOUNTER — TELEPHONE (OUTPATIENT)
Dept: FAMILY MEDICINE | Facility: CLINIC | Age: 48
End: 2023-08-14
Payer: MEDICARE

## 2023-08-14 NOTE — TELEPHONE ENCOUNTER
----- Message from Alicia Wolff sent at 8/14/2023  8:22 AM CDT -----  Needs a call back regarding a missed call from Friday [phone]

## 2023-08-14 NOTE — TELEPHONE ENCOUNTER
Spoke with pt and explained that we had initially attempted to contact her to discuss her labs but also saw that she had already seen them on her pt portal. Pt vo understanding.

## 2023-08-29 PROBLEM — Z78.9 STATIN INTOLERANCE: Status: ACTIVE | Noted: 2023-08-29

## 2023-09-09 DIAGNOSIS — Z71.89 COMPLEX CARE COORDINATION: ICD-10-CM

## 2023-09-14 ENCOUNTER — OFFICE VISIT (OUTPATIENT)
Dept: FAMILY MEDICINE | Facility: CLINIC | Age: 48
End: 2023-09-14
Payer: MEDICARE

## 2023-09-14 VITALS
BODY MASS INDEX: 29.89 KG/M2 | TEMPERATURE: 97 F | WEIGHT: 142.38 LBS | RESPIRATION RATE: 18 BRPM | SYSTOLIC BLOOD PRESSURE: 111 MMHG | HEIGHT: 58 IN | OXYGEN SATURATION: 99 % | DIASTOLIC BLOOD PRESSURE: 69 MMHG | HEART RATE: 60 BPM

## 2023-09-14 DIAGNOSIS — R33.9 BLADDER RETENTION OF URINE: Primary | ICD-10-CM

## 2023-09-14 DIAGNOSIS — R21 RASH: ICD-10-CM

## 2023-09-14 LAB
BILIRUB UR QL STRIP: NEGATIVE
CLARITY UR: NORMAL
COLOR UR: NORMAL
GLUCOSE UR STRIP-MCNC: NORMAL MG/DL
KETONES UR STRIP-SCNC: NEGATIVE MG/DL
LEUKOCYTE ESTERASE UR QL STRIP: NEGATIVE
NITRITE UR QL STRIP: NEGATIVE
PH UR STRIP: 5.5 PH UNITS
PROT UR QL STRIP: NEGATIVE
RBC # UR STRIP: NEGATIVE /UL
SP GR UR STRIP: 1.02
UROBILINOGEN UR STRIP-ACNC: NORMAL MG/DL

## 2023-09-14 PROCEDURE — 99213 PR OFFICE/OUTPT VISIT, EST, LEVL III, 20-29 MIN: ICD-10-PCS | Mod: ,,, | Performed by: STUDENT IN AN ORGANIZED HEALTH CARE EDUCATION/TRAINING PROGRAM

## 2023-09-14 PROCEDURE — 81003 URINALYSIS AUTO W/O SCOPE: CPT | Mod: QW,,, | Performed by: CLINICAL MEDICAL LABORATORY

## 2023-09-14 PROCEDURE — 99213 OFFICE O/P EST LOW 20 MIN: CPT | Mod: ,,, | Performed by: STUDENT IN AN ORGANIZED HEALTH CARE EDUCATION/TRAINING PROGRAM

## 2023-09-14 PROCEDURE — 81003 URINALYSIS, REFLEX TO URINE CULTURE: ICD-10-PCS | Mod: QW,,, | Performed by: CLINICAL MEDICAL LABORATORY

## 2023-09-14 RX ORDER — TRIAMCINOLONE ACETONIDE 1 MG/G
CREAM TOPICAL 2 TIMES DAILY
Qty: 45 G | Refills: 0 | Status: SHIPPED | OUTPATIENT
Start: 2023-09-14

## 2023-09-14 RX ORDER — MELOXICAM 15 MG/1
TABLET ORAL
COMMUNITY
Start: 2023-07-22

## 2023-09-14 RX ORDER — TAMSULOSIN HYDROCHLORIDE 0.4 MG/1
0.4 CAPSULE ORAL DAILY
Qty: 90 CAPSULE | Refills: 1 | Status: SHIPPED | OUTPATIENT
Start: 2023-09-14 | End: 2024-09-13

## 2023-09-14 NOTE — PROGRESS NOTES
Progress Note     LEE GOMEZ MD   [address]  [place] [address]     PATIENT NAME: Jorge Ivory  : 1975  DATE: 23  MRN: [id number]      Billing Provider: LEE GOMEZ MD  Level of Service:   Patient PCP Information       Provider PCP Type    [facility]                Follow-up (3mth f/u appt)      SUBJECTIVE:     Jorge Ivory is a 47 y.o.female who presents to clinic for Follow-up (3mth f/u appt)    Patient presents to clinic today for follow up for urinary retention.  Patient was evaluated on . Retroperitoneal and pelvic ultrasounds were obtained and were unremarkable. She reported she did not feel as if she was emptying her bladder completely. She has a history of urinary retention and has seen urology in the past. She was told it was 2/2 her lupus. Patient was given a prescription for flomax with improvement in symptoms.  Patient reports that she continues to have some bladder pain at times.  She is not noticed any dysuria or changes in her urination.  She does note that at times she has to note that at times she feels like she needs to urinate but has difficulty starting a stream.  Patient would be interested in seeing Urology again.  She is amenable to being seen at [facility].    Patient also has an area of hyperpigmentation on her left lateral ankle with no tenderness to palpation or open wound.  She notes it has been present for some time.    ROS     Past Medical History:  has a past medical history of Anemia, Diabetes mellitus, type 2, Hypertension, Lupus (systemic lupus erythematosus) (), Other long term (current) drug therapy, Rheumatoid arthritis (), Statin intolerance (2023), and Vitamin D deficiency, unspecified.   Past Surgical History:  has a past surgical history that includes Carpal tunnel release;  section (); Colonoscopy (2018); and Hysterectomy.  Family History: family  history includes Asthma in her mother; Cancer in her maternal grandmother and paternal grandmother; Diabetes in her maternal grandfather and mother; Heart failure in her mother; Hypertension in her mother; Thyroid disease in her mother.  Social History:  reports that she has never smoked. She has never been exposed to tobacco smoke. She has never used smokeless tobacco. She reports that she does not drink alcohol and does not use drugs.  Allergies:   Review of patient's allergies indicates:   Allergen Reactions    Iodinated contrast media Shortness Of Breath and Rash    Iodine and iodide containing products Anaphylaxis    Iodine containing multivitamin Anaphylaxis    Cholecalciferol (vitamin d3)     Lisinopril Other (See Comments)    Methotrexate sodium     Shellfish derived     Statins-hmg-coa reductase inhibitors     Vicryl [sutures, polyglycolic acid]     Iodine Hives and Rash    Lincomycin Hives and Rash    Penicillins Rash     Other reaction(s): Rash, DifficultyBreathing         Current Outpatient Medications:     acetaminophen (TYLENOL) 500 MG tablet, Take 500 mg by mouth 2 (two) times daily as needed., Disp: , Rfl:     busPIRone (BUSPAR) 5 MG Tab, Take 1 tablet by mouth three times a day as needed for anxiety, Disp: , Rfl:     cyclobenzaprine (FLEXERIL) 10 MG tablet, Take 1 tablet (10 mg total) by mouth as needed for Muscle spasms., Disp: 60 tablet, Rfl: 1    EScitalopram oxalate (LEXAPRO) 10 MG tablet, Take 10 mg by mouth., Disp: , Rfl:     esomeprazole (NEXIUM) 20 MG capsule, , Disp: , Rfl:     ezetimibe (ZETIA) 10 mg tablet, Take 1 tablet (10 mg total) by mouth once daily., Disp: 90 tablet, Rfl: 3    ferrous sulfate (FEOSOL) 325 mg (65 mg iron) Tab tablet, Take 325 mg by mouth daily with breakfast., Disp: , Rfl:     guaiFENesin (MUCINEX) 600 mg 12 hr tablet, , Disp: , Rfl:     loratadine (CLARITIN) 10 mg tablet, Take 1 tablet (10 mg total) by mouth once daily. One tab daily for allergies, sinus drainage as  "needed, Disp: 90 tablet, Rfl: 3    meloxicam (MOBIC) 15 MG tablet, Take 1 tablet (15 mg total) by mouth daily., Disp: , Rfl:     metoprolol succinate (TOPROL-XL) 25 MG 24 hr tablet, Take 25 mg by mouth., Disp: , Rfl:     olmesartan (BENICAR) 5 MG Tab, Take 10 mg by mouth., Disp: , Rfl:     prednisoLONE acetate (PRED FORTE) 1 % DrpS, Place 1 drop into both eyes 2 (two) times daily., Disp: , Rfl:     RESTASIS 0.05 % ophthalmic emulsion, INSTILL ONE DROP IN EACH EYE TWICE DAILY AS DIRECTED, Disp: , Rfl:     tamsulosin (FLOMAX) 0.4 mg Cap, Take 1 capsule (0.4 mg total) by mouth once daily., Disp: 90 capsule, Rfl: 1    triamcinolone acetonide 0.1% (KENALOG) 0.1 % cream, Apply topically 2 (two) times daily., Disp: 45 g, Rfl: 0   OBJECTIVE:     Vital Signs   /69 (BP Location: Right arm, Patient Position: Sitting, BP Method: Large (Automatic))   Pulse 60   Temp 97.4 °F (36.3 °C) (Temporal)   Resp 18   Ht 4' 10" (1.473 m)   Wt 64.6 kg (142 lb 6.4 oz)   SpO2 99%   BMI 29.76 kg/m²     Physical Exam  Constitutional:       General: She is not in acute distress.     Appearance: Normal appearance. She is not ill-appearing, toxic-appearing or diaphoretic.   HENT:      Head: Normocephalic and atraumatic.   Eyes:      Extraocular Movements: Extraocular movements intact.      Pupils: Pupils are equal, round, and reactive to light.   Cardiovascular:      Rate and Rhythm: Normal rate and regular rhythm.      Pulses: Normal pulses.      Heart sounds: Normal heart sounds. No murmur heard.     No friction rub. No gallop.   Pulmonary:      Effort: Pulmonary effort is normal. No respiratory distress.      Breath sounds: No wheezing, rhonchi or rales.   Abdominal:      General: Abdomen is flat.      Palpations: Abdomen is soft.      Tenderness: There is no abdominal tenderness. There is no right CVA tenderness, left CVA tenderness, guarding or rebound.   Musculoskeletal:         General: Normal range of motion.      Cervical " back: Normal range of motion.   Skin:     General: Skin is warm and dry.      Capillary Refill: Capillary refill takes less than 2 seconds.      Comments: 1 x 1 cm area of hyperpigmentation on left lateral ankle.  No tenderness to palpation.  No fluctuance.  No induration. No scaling.    Neurological:      General: No focal deficit present.      Mental Status: She is alert.   Psychiatric:         Mood and Affect: Mood normal.         Behavior: Behavior normal.         ASSESSMENT/PLAN:     1. Bladder retention of urine  -     tamsulosin (FLOMAX) 0.4 mg Cap; Take 1 capsule (0.4 mg total) by mouth once daily.  Dispense: 90 capsule; Refill: 1  -     Urinalysis, Reflex to Urine Culture    Patient with intermittent retention of urine.  Patient is taking Flomax with some relief.  UA obtained and is unremarkable.  Patient with previous history of bladder retention thought to be secondary to her lupus.  We will refer to Urology for re-evaluation.     2. Rash  -     triamcinolone acetonide 0.1% (KENALOG) 0.1 % cream; Apply topically 2 (two) times daily.  Dispense: 45 g; Refill: 0  Patient with rash which is possibly allergic in nature.  We will prescribe topical steroid.  Patient to follow up with PCP if rash persists.      Follow up if symptoms worsen or fail to improve.      LEE GOMEZ MD  09/14/2023

## 2023-11-26 NOTE — PROGRESS NOTES
Subjective     Patient ID: Sinceandriy Ivory is a 48 y.o. female.    Chief Complaint: No chief complaint on file.    This pleasant 48 year old female presents to the clinic as a new patient referral from Dr. MAXIME Arias for urinary retention. Patient states symptoms started in 2018 and was previously evaluated by Dr. Dowd at [facility]. She reports having a cystoscopy at that time and started on Oxybutynin 10 mg. She reports this medication was replaced by flomax 0.4 mg daily. She is tolerating the medication without side effects. She reports mixed incontinence with urge and stress incontinence. We discussed doing the Kegel exercises and she was given samples of Myrbetriq 50 mg one daily and will take the Flomax 0.4 mg one at night. She reports having suspected glaucoma and is routinely being evaluated for the glaucoma. She also has Lupus. She reports frequency, urgency, and nocturia 4 to 5 times a night. She reports incomplete bladder emptying and feels like she still needs to urinate after voiding. We discussed double voiding. Her PVR was 0 mls at today's visit. She denies dysuria or hematuria. She denies any abdominal, bladder or back pain. She reports having a UTI in August 2023 that was treated with antibiotics but is unsure which one. There was not a urine culture in the [facility] system. She reports having a partial hysterectomy and bladder tac by OB/GYN Dr. Ricardo Dennis in 2009. She denies smoking or drinking alcohol. I will culture her urine and treat if indicated. She again will continue the flomax 0.4 mg one at night and try the Myrbetriq 50 mg one daily in addition to the kegel exercises. I discussed the plan with Urologist Dr. PORTIA Levy and the patient and they are in agreement with the plan. All her questions were answered at today's visit. I spent 30 minutes counseling this patient.  ---------------  [November 27, 2023].        Review of Systems   Constitutional:  Negative for activity change and  fever.   HENT:  Negative for hearing loss and trouble swallowing.    Eyes:  Negative for visual disturbance.   Respiratory:  Negative for cough, shortness of breath and wheezing.    Cardiovascular:  Negative for chest pain.   Gastrointestinal:  Negative for abdominal pain, diarrhea, nausea and vomiting.   Endocrine: Negative for polyuria.   Genitourinary:  Positive for bladder incontinence, frequency, nocturia and urgency. Negative for decreased urine volume, difficulty urinating, dysuria, enuresis, flank pain and hematuria.        Urinary retention    Musculoskeletal:  Negative for back pain and gait problem.   Integumentary:  Negative for rash.   Neurological:  Negative for speech difficulty and weakness.   Psychiatric/Behavioral:  Negative for behavioral problems and confusion.           Objective     Physical Exam  Vitals and nursing note reviewed.   Constitutional:       General: She is not in acute distress.     Appearance: Normal appearance. She is not ill-appearing, toxic-appearing or diaphoretic.   HENT:      Head: Normocephalic.   Eyes:      Extraocular Movements: Extraocular movements intact.   Cardiovascular:      Rate and Rhythm: Normal rate and regular rhythm.      Heart sounds: Normal heart sounds.   Pulmonary:      Effort: Pulmonary effort is normal.      Breath sounds: Normal breath sounds. No wheezing, rhonchi or rales.   Abdominal:      General: Bowel sounds are normal.      Palpations: Abdomen is soft.      Tenderness: There is no abdominal tenderness. There is no right CVA tenderness, left CVA tenderness, guarding or rebound.   Musculoskeletal:         General: Normal range of motion.      Cervical back: Normal range of motion. No rigidity.   Skin:     General: Skin is warm and dry.   Neurological:      General: No focal deficit present.      Mental Status: She is alert and oriented to person, place, and time.      Motor: No weakness.      Coordination: Coordination normal.      Gait: Gait  normal.   Psychiatric:         Mood and Affect: Mood normal.         Behavior: Behavior normal.         Thought Content: Thought content normal.          Assessment and Plan     Problem List Items Addressed This Visit          Renal/    Bladder retention of urine - Primary    Mixed incontinence urge and stress    Bladder spasm    Frequency of urination    Relevant Orders    Urine culture    Urgency of urination    Relevant Orders    Urine culture    Nocturia    Relevant Orders    Urine culture        Urine culture   Continue Flomax 0.4 mg one at night   Sample Myrbetriq 50 mg take one tablet in the mornings   Let us know if the Myrbetriq is working and we will send in a prescription to your pharmacy   Follow up with Urology NP WERO Crespo in 3 months or sooner if needed

## 2023-11-27 ENCOUNTER — OFFICE VISIT (OUTPATIENT)
Dept: UROLOGY | Facility: CLINIC | Age: 48
End: 2023-11-27
Payer: MEDICARE

## 2023-11-27 VITALS
BODY MASS INDEX: 31.49 KG/M2 | DIASTOLIC BLOOD PRESSURE: 70 MMHG | OXYGEN SATURATION: 99 % | HEART RATE: 70 BPM | TEMPERATURE: 98 F | SYSTOLIC BLOOD PRESSURE: 138 MMHG | WEIGHT: 150 LBS | HEIGHT: 58 IN

## 2023-11-27 DIAGNOSIS — N32.89 BLADDER SPASM: ICD-10-CM

## 2023-11-27 DIAGNOSIS — R35.1 NOCTURIA: ICD-10-CM

## 2023-11-27 DIAGNOSIS — R33.9 BLADDER RETENTION OF URINE: Primary | ICD-10-CM

## 2023-11-27 DIAGNOSIS — R39.15 URGENCY OF URINATION: ICD-10-CM

## 2023-11-27 DIAGNOSIS — R35.0 FREQUENCY OF URINATION: ICD-10-CM

## 2023-11-27 DIAGNOSIS — N39.46 MIXED INCONTINENCE URGE AND STRESS: ICD-10-CM

## 2023-11-27 PROCEDURE — 87086 URINE CULTURE/COLONY COUNT: CPT | Mod: ,,, | Performed by: CLINICAL MEDICAL LABORATORY

## 2023-11-27 PROCEDURE — 99214 OFFICE O/P EST MOD 30 MIN: CPT | Mod: S$PBB,,, | Performed by: NURSE PRACTITIONER

## 2023-11-27 PROCEDURE — 99214 PR OFFICE/OUTPT VISIT, EST, LEVL IV, 30-39 MIN: ICD-10-PCS | Mod: S$PBB,,, | Performed by: NURSE PRACTITIONER

## 2023-11-27 PROCEDURE — 99215 OFFICE O/P EST HI 40 MIN: CPT | Mod: PBBFAC | Performed by: NURSE PRACTITIONER

## 2023-11-27 PROCEDURE — 87086 CULTURE, URINE: ICD-10-PCS | Mod: ,,, | Performed by: CLINICAL MEDICAL LABORATORY

## 2023-11-27 RX ORDER — DOXYCYCLINE 100 MG/1
100 CAPSULE ORAL 2 TIMES DAILY
COMMUNITY
Start: 2023-11-16 | End: 2023-12-12

## 2023-11-27 RX ORDER — OLOPATADINE HYDROCHLORIDE 2 MG/ML
1 SOLUTION/ DROPS OPHTHALMIC DAILY
COMMUNITY
Start: 2023-11-06

## 2023-11-27 NOTE — PATIENT INSTRUCTIONS
Urine culture   Continue Flomax 0.4 mg one at night   Sample Myrbetriq 50 mg take one tablet in the mornings   Let us know if the Myrbetriq is working and we will send in a prescription to your pharmacy   Follow up with Urology NP WERO Crespo in 3 months or sooner if needed

## 2023-11-29 ENCOUNTER — TELEPHONE (OUTPATIENT)
Dept: UROLOGY | Facility: CLINIC | Age: 48
End: 2023-11-29
Payer: MEDICARE

## 2023-11-29 LAB — UA COMPLETE W REFLEX CULTURE PNL UR: NORMAL

## 2023-11-29 NOTE — TELEPHONE ENCOUNTER
----- Message from Kobe Barker NP sent at 11/29/2023  1:11 PM CST -----  Please notify patient her urine culture was negative, thanks   I called pt and informed her of the above message.  She voiced understanding.

## 2023-12-05 ENCOUNTER — TELEPHONE (OUTPATIENT)
Dept: UROLOGY | Facility: CLINIC | Age: 48
End: 2023-12-05
Payer: MEDICARE

## 2023-12-05 DIAGNOSIS — N32.89 BLADDER SPASM: ICD-10-CM

## 2023-12-05 DIAGNOSIS — N39.46 MIXED INCONTINENCE URGE AND STRESS: Primary | ICD-10-CM

## 2023-12-05 RX ORDER — MIRABEGRON 25 MG/1
TABLET, FILM COATED, EXTENDED RELEASE ORAL
Qty: 30 TABLET | Refills: 11 | Status: SHIPPED | OUTPATIENT
Start: 2023-12-05

## 2023-12-05 NOTE — TELEPHONE ENCOUNTER
I received a call from the pt.  States she was given samples of Myrbetriq 50mg tabs to take one daily.  She said they did work and improved her voiding symptoms, but she thinks they a a bit too strong, and would like to have the 25mg tablets sent in to her drug store [facility] in [place]  She says she is allergic to shellfish, iodine, Penicillin.  I told her I will relay this information to MICHELET Barker, and he usually sends me a note when he takes care of it, and I will call back and let you know or leave you a message.  She voiced understanding.

## 2023-12-06 NOTE — TELEPHONE ENCOUNTER
I (RN SHYLA Ernst)  received a call from the pt.  States she was given samples of Myrbetriq 50mg tabs to take one daily.  She said they did work and improved her voiding symptoms, but she thinks they a a bit too strong, and would like to have the 25mg tablets sent in to her drug store [facility] in [place], [place]  She says she is allergic to shellfish, iodine, Penicillin.      Escript for Myrbetriq 25 mg one at night sent to patient's pharmacy.

## 2023-12-12 ENCOUNTER — OFFICE VISIT (OUTPATIENT)
Dept: FAMILY MEDICINE | Facility: CLINIC | Age: 48
End: 2023-12-12
Payer: MEDICARE

## 2023-12-12 VITALS
WEIGHT: 149.63 LBS | HEART RATE: 61 BPM | SYSTOLIC BLOOD PRESSURE: 125 MMHG | TEMPERATURE: 98 F | BODY MASS INDEX: 31.41 KG/M2 | HEIGHT: 58 IN | OXYGEN SATURATION: 100 % | DIASTOLIC BLOOD PRESSURE: 81 MMHG | RESPIRATION RATE: 18 BRPM

## 2023-12-12 DIAGNOSIS — Z23 NEED FOR VACCINATION: ICD-10-CM

## 2023-12-12 DIAGNOSIS — Z00.01 ENCOUNTER FOR GENERAL ADULT MEDICAL EXAMINATION WITH ABNORMAL FINDINGS: Primary | ICD-10-CM

## 2023-12-12 DIAGNOSIS — Z13.1 SCREENING FOR DIABETES MELLITUS: ICD-10-CM

## 2023-12-12 DIAGNOSIS — Z79.899 LONG-TERM USE OF HIGH-RISK MEDICATION: Chronic | ICD-10-CM

## 2023-12-12 DIAGNOSIS — M06.021 RHEUMATOID ARTHRITIS INVOLVING RIGHT ELBOW WITH NEGATIVE RHEUMATOID FACTOR: Chronic | ICD-10-CM

## 2023-12-12 DIAGNOSIS — M32.14 SYSTEMIC LUPUS ERYTHEMATOSUS WITH GLOMERULAR DISEASE, UNSPECIFIED SLE TYPE: Chronic | ICD-10-CM

## 2023-12-12 DIAGNOSIS — Z13.220 SCREENING FOR LIPID DISORDERS: ICD-10-CM

## 2023-12-12 DIAGNOSIS — Z78.9 STATIN INTOLERANCE: Chronic | ICD-10-CM

## 2023-12-12 DIAGNOSIS — E78.00 PURE HYPERCHOLESTEROLEMIA: Chronic | ICD-10-CM

## 2023-12-12 DIAGNOSIS — E55.9 VITAMIN D DEFICIENCY: Chronic | ICD-10-CM

## 2023-12-12 DIAGNOSIS — E11.9 DIABETES MELLITUS WITHOUT COMPLICATION: Chronic | ICD-10-CM

## 2023-12-12 PROBLEM — N30.00 ACUTE CYSTITIS WITHOUT HEMATURIA: Status: RESOLVED | Noted: 2023-06-14 | Resolved: 2023-12-12

## 2023-12-12 PROBLEM — R93.89 ABNORMAL CXR (CHEST X-RAY): Status: RESOLVED | Noted: 2022-03-11 | Resolved: 2023-12-12

## 2023-12-12 PROBLEM — R33.9 BLADDER RETENTION OF URINE: Status: RESOLVED | Noted: 2023-06-14 | Resolved: 2023-12-12

## 2023-12-12 PROBLEM — N32.89 BLADDER SPASM: Status: RESOLVED | Noted: 2023-11-27 | Resolved: 2023-12-12

## 2023-12-12 PROBLEM — R39.15 URGENCY OF URINATION: Status: RESOLVED | Noted: 2023-11-27 | Resolved: 2023-12-12

## 2023-12-12 LAB
BASOPHILS # BLD AUTO: 0.03 K/UL (ref 0–0.2)
BASOPHILS NFR BLD AUTO: 0.6 % (ref 0–1)
DIFFERENTIAL METHOD BLD: ABNORMAL
EOSINOPHIL # BLD AUTO: 0.06 K/UL (ref 0–0.5)
EOSINOPHIL NFR BLD AUTO: 1.3 % (ref 1–4)
ERYTHROCYTE [DISTWIDTH] IN BLOOD BY AUTOMATED COUNT: 13.2 % (ref 11.5–14.5)
HCT VFR BLD AUTO: 42.1 % (ref 38–47)
HGB BLD-MCNC: 13.3 G/DL (ref 12–16)
IMM GRANULOCYTES # BLD AUTO: 0 K/UL (ref 0–0.04)
IMM GRANULOCYTES NFR BLD: 0 % (ref 0–0.4)
LYMPHOCYTES # BLD AUTO: 1.73 K/UL (ref 1–4.8)
LYMPHOCYTES NFR BLD AUTO: 36 % (ref 27–41)
MCH RBC QN AUTO: 27.5 PG (ref 27–31)
MCHC RBC AUTO-ENTMCNC: 31.6 G/DL (ref 32–36)
MCV RBC AUTO: 87.2 FL (ref 80–96)
MONOCYTES # BLD AUTO: 0.38 K/UL (ref 0–0.8)
MONOCYTES NFR BLD AUTO: 7.9 % (ref 2–6)
MPC BLD CALC-MCNC: 12.1 FL (ref 9.4–12.4)
NEUTROPHILS # BLD AUTO: 2.6 K/UL (ref 1.8–7.7)
NEUTROPHILS NFR BLD AUTO: 54.2 % (ref 53–65)
NRBC # BLD AUTO: 0 X10E3/UL
NRBC, AUTO (.00): 0 %
PLATELET # BLD AUTO: 268 K/UL (ref 150–400)
RBC # BLD AUTO: 4.83 M/UL (ref 4.2–5.4)
WBC # BLD AUTO: 4.8 K/UL (ref 4.5–11)

## 2023-12-12 PROCEDURE — 99396 PREV VISIT EST AGE 40-64: CPT | Mod: GZ,,, | Performed by: NURSE PRACTITIONER

## 2023-12-12 PROCEDURE — 83036 HEMOGLOBIN GLYCOSYLATED A1C: CPT | Mod: ,,, | Performed by: CLINICAL MEDICAL LABORATORY

## 2023-12-12 PROCEDURE — 90686 IIV4 VACC NO PRSV 0.5 ML IM: CPT | Mod: ,,, | Performed by: NURSE PRACTITIONER

## 2023-12-12 PROCEDURE — 83036 HEMOGLOBIN A1C: ICD-10-PCS | Mod: ,,, | Performed by: CLINICAL MEDICAL LABORATORY

## 2023-12-12 PROCEDURE — 85025 CBC WITH DIFFERENTIAL: ICD-10-PCS | Mod: ,,, | Performed by: CLINICAL MEDICAL LABORATORY

## 2023-12-12 PROCEDURE — 82306 VITAMIN D: ICD-10-PCS | Mod: ,,, | Performed by: CLINICAL MEDICAL LABORATORY

## 2023-12-12 PROCEDURE — 90686 FLU VACCINE (QUAD) GREATER THAN OR EQUAL TO 3YO PRESERVATIVE FREE IM: ICD-10-PCS | Mod: ,,, | Performed by: NURSE PRACTITIONER

## 2023-12-12 PROCEDURE — G0008 ADMIN INFLUENZA VIRUS VAC: HCPCS | Mod: ,,, | Performed by: NURSE PRACTITIONER

## 2023-12-12 PROCEDURE — 85025 COMPLETE CBC W/AUTO DIFF WBC: CPT | Mod: ,,, | Performed by: CLINICAL MEDICAL LABORATORY

## 2023-12-12 PROCEDURE — 82306 VITAMIN D 25 HYDROXY: CPT | Mod: ,,, | Performed by: CLINICAL MEDICAL LABORATORY

## 2023-12-12 PROCEDURE — 80061 LIPID PANEL: ICD-10-PCS | Mod: ,,, | Performed by: CLINICAL MEDICAL LABORATORY

## 2023-12-12 PROCEDURE — 99396 PR PREVENTIVE VISIT,EST,40-64: ICD-10-PCS | Mod: GZ,,, | Performed by: NURSE PRACTITIONER

## 2023-12-12 PROCEDURE — G0008 FLU VACCINE (QUAD) GREATER THAN OR EQUAL TO 3YO PRESERVATIVE FREE IM: ICD-10-PCS | Mod: ,,, | Performed by: NURSE PRACTITIONER

## 2023-12-12 PROCEDURE — 80053 COMPREHENSIVE METABOLIC PANEL: ICD-10-PCS | Mod: ,,, | Performed by: CLINICAL MEDICAL LABORATORY

## 2023-12-12 PROCEDURE — 80061 LIPID PANEL: CPT | Mod: ,,, | Performed by: CLINICAL MEDICAL LABORATORY

## 2023-12-12 PROCEDURE — 80053 COMPREHEN METABOLIC PANEL: CPT | Mod: ,,, | Performed by: CLINICAL MEDICAL LABORATORY

## 2023-12-12 RX ORDER — CYCLOSPORINE 0.5 MG/ML
EMULSION OPHTHALMIC
COMMUNITY
Start: 2023-11-06

## 2023-12-12 NOTE — PROGRESS NOTES
HUGO Pereira   [facility]  [address]   [place] [address]     PATIENT NAME: Jorge Ivory  : 1975  DATE: 23  MRN: [id number]      Billing Provider: HUGO Pereira  Level of Service: [place] PREVENTIVE VISIT,EST,40-64  Patient PCP Information       Provider PCP Type    [facility] Pe[facility]            Reason for Visit / Chief Complaint: wellness (Medicaid Wellness )         History of Present Illness / Problem Focused Workflow     Sinceandriy Ivory is a 48 y.o. female presents to the clinic  for Wellness  exam.  She has history of SEDA and  had recent sleep studies and has not met  with provider yet.   Pt states she never got to good restorative sleep.  She is following with cardiology and has appt next week and will need labs faxed to Dr Barrios's office.    She has multiple rheumatology issues and her right thumb joints have been more bothersome and unable to   to open jars, door knobs.  -- she is currently seeing Dr Neville Haque in [place] at [facility].  She has had eye exam with Dr Noe in [place] and has diabetic eye exam scheduled in 2024.  She will take flu vaccine today but declines covid vaccine.      Review of Systems     Review of Systems   Constitutional:  Negative for fatigue.   HENT:  Negative for nasal congestion and sore throat.    Respiratory:  Negative for cough, chest tightness and shortness of breath.    Cardiovascular:  Negative for chest pain, palpitations and leg swelling.   Gastrointestinal:  Negative for nausea, vomiting and reflux.   Musculoskeletal:  Positive for joint swelling (right thumb).   Neurological:  Negative for weakness and memory loss.   Psychiatric/Behavioral:  Negative for confusion and sleep disturbance.         Medical / Social / Family History     Past Medical History:   Diagnosis Date    Anemia     Diabetes mellitus, type 2     Hypertension     Lupus (systemic lupus erythematosus) 2019    Other  long term (current) drug therapy     Rheumatoid arthritis     Statin intolerance 2023    Due to drug induced myalgia    Vitamin D deficiency, unspecified        Past Surgical History:   Procedure Laterality Date    CARPAL TUNNEL RELEASE       SECTION      COLONOSCOPY  2018    MARCO A--Dr. Isaac    HYSTERECTOMY         Social History  Ms.  reports that she has never smoked. She has never been exposed to tobacco smoke. She has never used smokeless tobacco. She reports that she does not drink alcohol and does not use drugs.    Family History  Ms.'s family history includes Asthma in her mother; Cancer in her maternal grandmother and paternal grandmother; Diabetes in her maternal grandfather and mother; Heart failure in her mother; Hypertension in her mother; Thyroid disease in her mother.    Medications and Allergies     Medications  Outpatient Medications Marked as Taking for the 23 encounter (Office Visit) with Angie Underwood FNP   Medication Sig Dispense Refill    acetaminophen (TYLENOL) 500 MG tablet Take 500 mg by mouth 2 (two) times daily as needed.      artificial tears ointment (REFRESH P.M.) Oint Place into both eyes every evening.      busPIRone (BUSPAR) 5 MG Tab Take 1 tablet by mouth three times a day as needed for anxiety      cyclobenzaprine (FLEXERIL) 10 MG tablet Take 1 tablet (10 mg total) by mouth as needed for Muscle spasms. 60 tablet 1    EScitalopram oxalate (LEXAPRO) 10 MG tablet Take 10 mg by mouth once daily.      esomeprazole (NEXIUM) 20 MG capsule Take 20 mg by mouth once daily.      ezetimibe (ZETIA) 10 mg tablet Take 1 tablet (10 mg total) by mouth once daily. 90 tablet 3    loratadine (CLARITIN) 10 mg tablet Take 1 tablet (10 mg total) by mouth once daily. One tab daily for allergies, sinus drainage as needed 90 tablet 3    meloxicam (MOBIC) 15 MG tablet Take 1 tablet (15 mg total) by mouth daily.      metoprolol succinate (TOPROL-XL) 25 MG 24 hr tablet Take 12.5  "mg by mouth once daily.      mirabegron (MYRBETRIQ) 25 mg Tb24 ER tablet Take one tablet at bedtime 30 tablet 11    olmesartan (BENICAR) 5 MG Tab Take 5 mg by mouth once daily.      olopatadine (PATADAY ONCE DAILY RELIEF) 0.2 % Drop Place 1 drop into both eyes once daily.      RESTASIS MULTIDOSE 0.05 % Drop Instill 1 drop into both eyes twice a day as directed      tamsulosin (FLOMAX) 0.4 mg Cap Take 1 capsule (0.4 mg total) by mouth once daily. 90 capsule 1    triamcinolone acetonide 0.1% (KENALOG) 0.1 % cream Apply topically 2 (two) times daily. 45 g 0       Allergies  Review of patient's allergies indicates:   Allergen Reactions    Iodinated contrast media Shortness Of Breath and Rash    Iodine and iodide containing products Anaphylaxis    Iodine containing multivitamin Anaphylaxis    Cholecalciferol (vitamin d3)     Lisinopril Other (See Comments)    Methotrexate sodium     Shellfish derived     Statins-hmg-coa reductase inhibitors     Vicryl [sutures, polyglycolic acid]     Iodine Hives and Rash    Lincomycin Hives and Rash    Penicillins Rash     Other reaction(s): Rash, DifficultyBreathing       Physical Examination     Vitals:    12/12/23 0943   BP: 125/81   BP Location: Right arm   Patient Position: Sitting   BP Method: Large (Automatic)   Pulse: 61   Resp: 18   Temp: 98.3 °F (36.8 °C)   TempSrc: Oral   SpO2: 100%   Weight: 67.9 kg (149 lb 9.6 oz)   Height: 4' 10" (1.473 m)      Physical Exam  Constitutional:       Appearance: Normal appearance.   Neck:      Vascular: No carotid bruit.   Cardiovascular:      Rate and Rhythm: Normal rate and regular rhythm.      Pulses: Normal pulses.      Heart sounds: Normal heart sounds.   Pulmonary:      Effort: Pulmonary effort is normal.      Breath sounds: Normal breath sounds.   Musculoskeletal:      Comments: Right thumb tender to palp at  joints.   Lymphadenopathy:      Cervical: No cervical adenopathy.   Skin:     General: Skin is warm and dry.   Neurological:     "  General: No focal deficit present.      Mental Status: She is alert and oriented to person, place, and time.   Psychiatric:         Mood and Affect: Mood normal.         Behavior: Behavior normal.          Assessment and Plan (including Health Maintenance)     :    Plan:  will check fasting lipids, A1c today and get flu shot. Continue with  Current meds.  Advised to follow up with sleep medicine to go over her test results and  she will follow up with Dr Barrios's NP   Next week and I will fax labs to them.   Encourage regular exercise, heart healthy diet.  She will continue to follow with rheumatology in [place].        Health Maintenance Due   Topic Date Due    Pneumococcal Vaccines (Age 0-64) (1 - PCV) Never done    TETANUS VACCINE  05/14/2017    COVID-19 Vaccine (3 - 2023-24 season) 09/01/2023    Lipid Panel  10/11/2023    Eye Exam  03/03/2024       Problem List Items Addressed This Visit          Cardiac/Vascular    Hyperlipidemia (Chronic)       Immunology/Multi System    Lupus (systemic lupus erythematosus) (Chronic)    Rheumatoid arthritis (Chronic)       Endocrine    Diabetes mellitus without complication    Vitamin D deficiency       Other    Statin intolerance    Overview     Due to drug induced myalgia          Other Visit Diagnoses       Encounter for general adult medical examination with abnormal findings    -  Primary    Screening for lipid disorders        Screening for diabetes mellitus        Long-term use of high-risk medication  (Chronic)       Need for vaccination            .  Encounter for general adult medical examination with abnormal findings    Screening for lipid disorders  -     Lipid Panel; Future; Expected date: 12/12/2023    Screening for diabetes mellitus  -     Hemoglobin A1C; Future; Expected date: 12/12/2023    Systemic lupus erythematosus with glomerular disease, unspecified SLE type    Rheumatoid arthritis involving right elbow with negative rheumatoid factor  -      Comprehensive Metabolic Panel; Future; Expected date: 12/12/2023    Long-term use of high-risk medication  -     CBC Auto Differential; Future; Expected date: 12/12/2023  -     Comprehensive Metabolic Panel; Future; Expected date: 12/12/2023    Vitamin D deficiency  -     Vitamin D; Future; Expected date: 12/12/2023    Pure hypercholesterolemia  -     Lipid Panel; Future; Expected date: 12/12/2023    Diabetes mellitus without complication  -     Hemoglobin A1C; Future; Expected date: 12/12/2023    Need for vaccination  -     Influenza - Quadrivalent (PF)    Statin intolerance       Health Maintenance Topics with due status: Not Due       Topic Last Completion Date    Colorectal Cancer Screening 09/12/2018    Foot Exam 03/28/2023    Mammogram 05/04/2023    Diabetes Urine Screening 08/10/2023    Hemoglobin A1c 08/10/2023       Procedures     Future Appointments   Date Time Provider Department Center   2/27/2024 10:00 AM Kobe Barker NP [facility]        No follow-ups on file.     Signature:  HUGO Pereira    Date of encounter: 12/12/23

## 2023-12-13 ENCOUNTER — PATIENT MESSAGE (OUTPATIENT)
Dept: FAMILY MEDICINE | Facility: CLINIC | Age: 48
End: 2023-12-13
Payer: MEDICARE

## 2023-12-13 LAB
25(OH)D3 SERPL-MCNC: 13.3 NG/ML
ALBUMIN SERPL BCP-MCNC: 3.5 G/DL (ref 3.5–5)
ALBUMIN/GLOB SERPL: 0.9 {RATIO}
ALP SERPL-CCNC: 80 U/L (ref 39–100)
ALT SERPL W P-5'-P-CCNC: 50 U/L (ref 13–56)
ANION GAP SERPL CALCULATED.3IONS-SCNC: 8 MMOL/L (ref 7–16)
AST SERPL W P-5'-P-CCNC: 31 U/L (ref 15–37)
BILIRUB SERPL-MCNC: 0.6 MG/DL (ref ?–1.2)
BUN SERPL-MCNC: 6 MG/DL (ref 7–18)
BUN/CREAT SERPL: 9 (ref 6–20)
CALCIUM SERPL-MCNC: 9 MG/DL (ref 8.5–10.1)
CHLORIDE SERPL-SCNC: 108 MMOL/L (ref 98–107)
CHOLEST SERPL-MCNC: 201 MG/DL (ref 0–200)
CHOLEST/HDLC SERPL: 4.4 {RATIO}
CO2 SERPL-SCNC: 27 MMOL/L (ref 21–32)
CREAT SERPL-MCNC: 0.68 MG/DL (ref 0.55–1.02)
EGFR (NO RACE VARIABLE) (RUSH/TITUS): 107 ML/MIN/1.73M2
EST. AVERAGE GLUCOSE BLD GHB EST-MCNC: 126 MG/DL
GLOBULIN SER-MCNC: 3.7 G/DL (ref 2–4)
GLUCOSE SERPL-MCNC: 117 MG/DL (ref 74–106)
HBA1C MFR BLD HPLC: 6 % (ref 4.5–6.6)
HDLC SERPL-MCNC: 46 MG/DL (ref 40–60)
LDLC SERPL CALC-MCNC: 135 MG/DL
LDLC/HDLC SERPL: 2.9 {RATIO}
NONHDLC SERPL-MCNC: 155 MG/DL
POTASSIUM SERPL-SCNC: 4.1 MMOL/L (ref 3.5–5.1)
PROT SERPL-MCNC: 7.2 G/DL (ref 6.4–8.2)
SODIUM SERPL-SCNC: 139 MMOL/L (ref 136–145)
TRIGL SERPL-MCNC: 99 MG/DL (ref 35–150)
VLDLC SERPL-MCNC: 20 MG/DL

## 2023-12-13 NOTE — PROGRESS NOTES
Sincere,   I did not send in the Vitamin D after all because your  chart is marked that  you are allergic.  Please try and get 30 minutes of sunshine daily and eat foods naturally high in vitamin D/tm

## 2023-12-13 NOTE — PROGRESS NOTES
Sincere,   Your cholesterol is about the same as previous--continue to work on heart healthy diet and exercise and Zetia.  When you see Dr Barrios, ask him if he thinks you need Repatha to lower your cholesterol or continue Zetia.  Electrolytes and kidney function all normal.  Blood count is  normal. A1c is 6.0 and stable. Vitamin D level is lower at 13 and need to be back on your high dose weekly Vitamin D--I willl send in rx./justino

## 2024-01-02 ENCOUNTER — OFFICE VISIT (OUTPATIENT)
Dept: FAMILY MEDICINE | Facility: CLINIC | Age: 49
End: 2024-01-02
Payer: MEDICARE

## 2024-01-02 VITALS
TEMPERATURE: 98 F | OXYGEN SATURATION: 99 % | DIASTOLIC BLOOD PRESSURE: 80 MMHG | WEIGHT: 149.38 LBS | HEART RATE: 81 BPM | BODY MASS INDEX: 31.36 KG/M2 | SYSTOLIC BLOOD PRESSURE: 126 MMHG | RESPIRATION RATE: 16 BRPM | HEIGHT: 58 IN

## 2024-01-02 DIAGNOSIS — J06.9 VIRAL URI WITH COUGH: Primary | ICD-10-CM

## 2024-01-02 DIAGNOSIS — R05.9 COUGH, UNSPECIFIED TYPE: ICD-10-CM

## 2024-01-02 DIAGNOSIS — R09.81 NASAL CONGESTION: ICD-10-CM

## 2024-01-02 LAB
CTP QC/QA: YES
CTP QC/QA: YES
FLUAV AG NPH QL: NEGATIVE
FLUBV AG NPH QL: NEGATIVE
SARS-COV-2 RDRP RESP QL NAA+PROBE: NEGATIVE

## 2024-01-02 PROCEDURE — 99213 OFFICE O/P EST LOW 20 MIN: CPT | Mod: ,,, | Performed by: STUDENT IN AN ORGANIZED HEALTH CARE EDUCATION/TRAINING PROGRAM

## 2024-01-02 PROCEDURE — 87635 SARS-COV-2 COVID-19 AMP PRB: CPT | Mod: RHCUB | Performed by: STUDENT IN AN ORGANIZED HEALTH CARE EDUCATION/TRAINING PROGRAM

## 2024-01-02 PROCEDURE — 87804 INFLUENZA ASSAY W/OPTIC: CPT | Mod: 59,QW,RHCUB | Performed by: STUDENT IN AN ORGANIZED HEALTH CARE EDUCATION/TRAINING PROGRAM

## 2024-01-02 RX ORDER — GUAIFENESIN 600 MG/1
1200 TABLET, EXTENDED RELEASE ORAL 2 TIMES DAILY
Qty: 40 TABLET | Refills: 0 | Status: SHIPPED | OUTPATIENT
Start: 2024-01-02 | End: 2024-01-12

## 2024-01-02 RX ORDER — PITAVASTATIN MAGNESIUM 4 MG/1
4 TABLET, FILM COATED ORAL DAILY
COMMUNITY

## 2024-01-02 RX ORDER — BENZONATATE 100 MG/1
100 CAPSULE ORAL 3 TIMES DAILY PRN
Qty: 30 CAPSULE | Refills: 0 | Status: SHIPPED | OUTPATIENT
Start: 2024-01-02 | End: 2024-01-12

## 2024-01-02 RX ORDER — FLUTICASONE PROPIONATE 50 MCG
1 SPRAY, SUSPENSION (ML) NASAL DAILY
Qty: 16 G | Refills: 0 | Status: SHIPPED | OUTPATIENT
Start: 2024-01-02

## 2024-01-02 NOTE — PROGRESS NOTES
01/02/24 1551   Depression Patient Health Questionnaire (PHQ-2)   Over the last two weeks how often have you been bothered by little interest or pleasure in doing things 0   Over the last two weeks how often have you been bothered by feeling down, depressed or hopeless 0   PHQ-2 Total Score 0   Depression Patient Health Questionnaire (PHQ-9)   Over the last two weeks how often have you been bothered by trouble falling or staying asleep, or sleeping too much 0   Over the last two weeks how often have you been bothered by feeling tired or having little energy 3   Over the last two weeks how often have you been bothered by a poor appetite or overeating 0   Over the last two weeks how often have you been bothered by feeling bad about yourself - or that you are a failure or have let yourself or your family down 0   Over the last two weeks how often have you been bothered by trouble concentrating on things, such as reading the newspaper or watching television 0   Over the last two weeks how often have you been bothered by moving or speaking so slowly that other people could have noticed. Or the opposite - being so fidgety or restless that you have been moving around a lot more than usual. 0   Over the last two weeks how often have you been bothered by thoughts that you would be better off dead, or of hurting yourself 0   If you checked off any problems, how difficult have these problems made it for you to do your work, take care of things at home or get along with other people? Somewhat difficult   PHQ-9 Score 3   PHQ-9 Interpretation Minimal or None

## 2024-01-02 NOTE — PROGRESS NOTES
Progress Note     LEE GOMEZ MD   [address]  [place] [address]     PATIENT NAME: Jorge Ivory  : 1975  DATE: 24  MRN: [id number]      Billing Provider: LEE GOMEZ MD  Level of Service:   Patient PCP Information       Provider PCP Type    [facility]                Nasal Congestion (Started Thursday. Taking Claritin and Dayquil. Also reports she has been taking some doxycycline 100 mg from an older prescription. She has had one a day x 4 days. ), Ear Fullness (Left ear has felt full since Saturday. Reports it feels a little better today. ), Sore Throat (C/o sore throat from drainage and clearing her throat. ), and Cough (Started last night. Non-productive, dry cough. )      SUBJECTIVE:     Jorge Ivory is a 48 y.o.female who presents to clinic for Nasal Congestion (Started Thursday. Taking Claritin and Dayquil. Also reports she has been taking some doxycycline 100 mg from an older prescription. She has had one a day x 4 days. ), Ear Fullness (Left ear has felt full since Saturday. Reports it feels a little better today. ), Sore Throat (C/o sore throat from drainage and clearing her throat. ), and Cough (Started last night. Non-productive, dry cough. )      Patient presents to clinic today with URI symptoms.  Patient notes onset of symptoms last Thursday.  Patient states that she initially developed drainage and throat irritation.  On Saturday she developed nasal congestion and cough.  Patient has been taking Claritin and DayQuil.  Patient started taking doxycycline once daily on Saturday.  Patient has been afebrile.  She notes her nasal drainage is intermittent and clear in color.  She has no sputum production.  She was having some intermittent fullness in her left ear.  She feels better overall today.    All other pertinent review of systems negative. Please see HPI for details.     Past Medical History:  has a past  medical history of Anemia, Diabetes mellitus, type 2, Hypertension, Lupus (systemic lupus erythematosus) (2019), Other long term (current) drug therapy, Rheumatoid arthritis (), Statin intolerance (2023), and Vitamin D deficiency, unspecified.   Past Surgical History:  has a past surgical history that includes Carpal tunnel release;  section (); Colonoscopy (2018); Hysterectomy; and Tubal ligation (2006).  Family History: family history includes Arthritis in her father and sister; Asthma in her mother; Cancer in her maternal aunt, maternal grandmother, and paternal grandmother; Diabetes in her maternal aunt, maternal grandfather, and mother; Heart disease in her maternal aunt, maternal grandmother, and mother; Heart failure in her mother; Hyperlipidemia in her maternal aunt and maternal grandmother; Hypertension in her maternal aunt, maternal grandmother, and mother; Kidney disease in her maternal aunt; Miscarriages / Stillbirths in her mother; Thyroid disease in her mother; Vision loss in her maternal grandfather, maternal grandmother, maternal uncle, and mother.  Social History:  reports that she has never smoked. She has never been exposed to tobacco smoke. She has never used smokeless tobacco. She reports that she does not drink alcohol and does not use drugs.  Allergies:   Review of patient's allergies indicates:   Allergen Reactions    Iodinated contrast media Shortness Of Breath and Rash    Iodine and iodide containing products Anaphylaxis    Iodine containing multivitamin Anaphylaxis    Cholecalciferol (vitamin d3)     Lisinopril Other (See Comments)    Methotrexate sodium     Shellfish derived     Statins-hmg-coa reductase inhibitors     Vicryl [sutures, polyglycolic acid]     Iodine Hives and Rash    Lincomycin Hives and Rash    Penicillins Rash     Other reaction(s): Rash, DifficultyBreathing         Current Outpatient Medications:     acetaminophen (TYLENOL) 500 MG  tablet, Take 500 mg by mouth 2 (two) times daily as needed., Disp: , Rfl:     artificial tears ointment (REFRESH P.M.) Oint, Place into both eyes every evening., Disp: , Rfl:     busPIRone (BUSPAR) 5 MG Tab, Take 1 tablet by mouth three times a day as needed for anxiety, Disp: , Rfl:     cyclobenzaprine (FLEXERIL) 10 MG tablet, Take 1 tablet (10 mg total) by mouth as needed for Muscle spasms., Disp: 60 tablet, Rfl: 1    EScitalopram oxalate (LEXAPRO) 10 MG tablet, Take 10 mg by mouth once daily., Disp: , Rfl:     esomeprazole (NEXIUM) 20 MG capsule, Take 20 mg by mouth once daily., Disp: , Rfl:     ferrous sulfate (FEOSOL) 325 mg (65 mg iron) Tab tablet, Take 325 mg by mouth daily with breakfast., Disp: , Rfl:     loratadine (CLARITIN) 10 mg tablet, Take 1 tablet (10 mg total) by mouth once daily. One tab daily for allergies, sinus drainage as needed, Disp: 90 tablet, Rfl: 3    meloxicam (MOBIC) 15 MG tablet, Take 1 tablet (15 mg total) by mouth daily., Disp: , Rfl:     metoprolol succinate (TOPROL-XL) 25 MG 24 hr tablet, Take 12.5 mg by mouth once daily., Disp: , Rfl:     mirabegron (MYRBETRIQ) 25 mg Tb24 ER tablet, Take one tablet at bedtime, Disp: 30 tablet, Rfl: 11    olmesartan (BENICAR) 5 MG Tab, Take 5 mg by mouth once daily., Disp: , Rfl:     olopatadine (PATADAY ONCE DAILY RELIEF) 0.2 % Drop, Place 1 drop into both eyes once daily., Disp: , Rfl:     pitavastatin magnesium (ZYPITAMAG) 4 mg Tab, Take 4 mg by mouth once daily., Disp: , Rfl:     RESTASIS MULTIDOSE 0.05 % Drop, Instill 1 drop into both eyes twice a day as directed, Disp: , Rfl:     tamsulosin (FLOMAX) 0.4 mg Cap, Take 1 capsule (0.4 mg total) by mouth once daily., Disp: 90 capsule, Rfl: 1    triamcinolone acetonide 0.1% (KENALOG) 0.1 % cream, Apply topically 2 (two) times daily., Disp: 45 g, Rfl: 0    benzonatate (TESSALON) 100 MG capsule, Take 1 capsule (100 mg total) by mouth 3 (three) times daily as needed for Cough., Disp: 30 capsule, Rfl:  "0    ezetimibe (ZETIA) 10 mg tablet, Take 1 tablet (10 mg total) by mouth once daily. (Patient not taking: Reported on 1/2/2024), Disp: 90 tablet, Rfl: 3    fluticasone propionate (FLONASE) 50 mcg/actuation nasal spray, 1 spray (50 mcg total) by Each Nostril route once daily., Disp: 16 g, Rfl: 0    guaiFENesin (MUCINEX) 600 mg 12 hr tablet, Take 2 tablets (1,200 mg total) by mouth 2 (two) times daily. for 10 days, Disp: 40 tablet, Rfl: 0   OBJECTIVE:     Vital Signs   /80 (BP Location: Right arm, Patient Position: Sitting, BP Method: Medium (Automatic))   Pulse 81   Temp 98.4 °F (36.9 °C) (Oral)   Resp 16   Ht 4' 10" (1.473 m)   Wt 67.8 kg (149 lb 6.4 oz)   SpO2 99%   BMI 31.22 kg/m²     Physical Exam  Constitutional:       General: She is not in acute distress.     Appearance: Normal appearance. She is not ill-appearing, toxic-appearing or diaphoretic.   HENT:      Head: Normocephalic and atraumatic.      Right Ear: Tympanic membrane normal.      Left Ear: Tympanic membrane normal.      Nose: Congestion present. No rhinorrhea.      Mouth/Throat:      Mouth: Mucous membranes are moist.      Pharynx: No oropharyngeal exudate or posterior oropharyngeal erythema.   Eyes:      Extraocular Movements: Extraocular movements intact.      Pupils: Pupils are equal, round, and reactive to light.   Cardiovascular:      Rate and Rhythm: Normal rate and regular rhythm.      Pulses: Normal pulses.      Heart sounds: Normal heart sounds. No murmur heard.     No friction rub. No gallop.   Pulmonary:      Effort: Pulmonary effort is normal. No respiratory distress.      Breath sounds: No wheezing, rhonchi or rales.   Abdominal:      General: Abdomen is flat.      Palpations: Abdomen is soft.      Tenderness: There is no abdominal tenderness. There is no guarding or rebound.   Musculoskeletal:         General: Normal range of motion.      Cervical back: Normal range of motion.   Skin:     General: Skin is warm and dry.    "   Capillary Refill: Capillary refill takes less than 2 seconds.   Neurological:      General: No focal deficit present.      Mental Status: She is alert.   Psychiatric:         Mood and Affect: Mood normal.         Behavior: Behavior normal.         ASSESSMENT/PLAN:     1. Viral URI with cough  -     guaiFENesin (MUCINEX) 600 mg 12 hr tablet; Take 2 tablets (1,200 mg total) by mouth 2 (two) times daily. for 10 days  Dispense: 40 tablet; Refill: 0  -     fluticasone propionate (FLONASE) 50 mcg/actuation nasal spray; 1 spray (50 mcg total) by Each Nostril route once daily.  Dispense: 16 g; Refill: 0  -     benzonatate (TESSALON) 100 MG capsule; Take 1 capsule (100 mg total) by mouth 3 (three) times daily as needed for Cough.  Dispense: 30 capsule; Refill: 0    2. Nasal congestion  -     POCT COVID-19 Rapid Screening  -     POCT Influenza A/B    3. Cough, unspecified type  -     POCT COVID-19 Rapid Screening  -     POCT Influenza A/B  Patient's symptoms consistent with viral URI.  Patient with no signs of superimposed bacterial infection at this time.  Patient may discontinue doxycycline.  Patient was tested for flu and COVID and was negative for both.  We will prescribe Mucinex, Flonase, and Tessalon Perles for symptomatic relief.  Discussed signs of superimposed bacterial infections.  Patient to follow up if symptoms worsen or fail to improve.  Patient verbalized understanding.      Follow up if symptoms worsen or fail to improve.      LEE GOMEZ MD  01/02/2024    Due to voice recognition software, sound alike and misspelled words may be contained in the documentation.

## 2024-02-12 ENCOUNTER — OFFICE VISIT (OUTPATIENT)
Dept: FAMILY MEDICINE | Facility: CLINIC | Age: 49
End: 2024-02-12
Payer: MEDICARE

## 2024-02-12 VITALS
WEIGHT: 144.81 LBS | TEMPERATURE: 98 F | OXYGEN SATURATION: 100 % | DIASTOLIC BLOOD PRESSURE: 86 MMHG | HEART RATE: 65 BPM | SYSTOLIC BLOOD PRESSURE: 134 MMHG | RESPIRATION RATE: 18 BRPM | BODY MASS INDEX: 30.39 KG/M2 | HEIGHT: 58 IN

## 2024-02-12 DIAGNOSIS — M06.9 RHEUMATOID ARTHRITIS INVOLVING MULTIPLE SITES, UNSPECIFIED WHETHER RHEUMATOID FACTOR PRESENT: Chronic | ICD-10-CM

## 2024-02-12 DIAGNOSIS — M54.2 NECK PAIN: Primary | ICD-10-CM

## 2024-02-12 DIAGNOSIS — M32.14 SYSTEMIC LUPUS ERYTHEMATOSUS WITH GLOMERULAR DISEASE, UNSPECIFIED SLE TYPE: Chronic | ICD-10-CM

## 2024-02-12 PROCEDURE — 99214 OFFICE O/P EST MOD 30 MIN: CPT | Mod: ,,, | Performed by: STUDENT IN AN ORGANIZED HEALTH CARE EDUCATION/TRAINING PROGRAM

## 2024-02-12 PROCEDURE — 96372 THER/PROPH/DIAG INJ SC/IM: CPT | Mod: ,,, | Performed by: STUDENT IN AN ORGANIZED HEALTH CARE EDUCATION/TRAINING PROGRAM

## 2024-02-12 RX ORDER — KETOROLAC TROMETHAMINE 30 MG/ML
30 INJECTION, SOLUTION INTRAMUSCULAR; INTRAVENOUS
Status: COMPLETED | OUTPATIENT
Start: 2024-02-12 | End: 2024-02-12

## 2024-02-12 RX ORDER — CYCLOSPORINE 0.5 MG/ML
1 EMULSION OPHTHALMIC 2 TIMES DAILY
COMMUNITY
Start: 2024-01-29

## 2024-02-12 RX ORDER — DEXAMETHASONE SODIUM PHOSPHATE 4 MG/ML
4 INJECTION, SOLUTION INTRA-ARTICULAR; INTRALESIONAL; INTRAMUSCULAR; INTRAVENOUS; SOFT TISSUE
Status: COMPLETED | OUTPATIENT
Start: 2024-02-12 | End: 2024-02-12

## 2024-02-12 RX ADMIN — KETOROLAC TROMETHAMINE 30 MG: 30 INJECTION, SOLUTION INTRAMUSCULAR; INTRAVENOUS at 04:02

## 2024-02-12 RX ADMIN — DEXAMETHASONE SODIUM PHOSPHATE 4 MG: 4 INJECTION, SOLUTION INTRA-ARTICULAR; INTRALESIONAL; INTRAMUSCULAR; INTRAVENOUS; SOFT TISSUE at 04:02

## 2024-02-12 NOTE — PROGRESS NOTES
Progress Note     LEE GOMEZ MD   [address]  [place] [address]     PATIENT NAME: Jorge Ivory  : 1975  DATE: 24  MRN: [id number]      Billing Provider: LEE GOMEZ MD  Level of Service:   Patient PCP Information       Provider PCP Type    Angie Underwood, FNP General                Temporomandibular Joint Pain (Patient reports has been having a lot of joint pain. Patient states pain is in the right thumb that has been for several months. Patient neck area has been on & off the neck pain has been causing her to have headaches with a lot of pressure in the back of her head. Right knee has been in pain for a month now. Patient has also had a MRI done in [facility] in .) and Health Maintenance (Patient had her flu vaccine in December. )      SUBJECTIVE:     Jorge Ivory is a 48 y.o.female who presents to clinic for Temporomandibular Joint Pain (Patient reports has been having a lot of joint pain. Patient states pain is in the right thumb that has been for several months. Patient neck area has been on & off the neck pain has been causing her to have headaches with a lot of pressure in the back of her head. Right knee has been in pain for a month now. Patient has also had a MRI done in [facility] in .) and Health Maintenance (Patient had her flu vaccine in December. )    Patient has a known history of lupus and RA. Saw Rheumatology last year in September. He is aware of joint pain.  She notes that she was having right thumb pain, right knee pain, intermittent neck pain, and occipital headaches.  Patient states that sometimes she will have pain in her neck that radiates down her right arm.  Patient without weakness or numbness.  Patient states that her right knee is painful particularly when she puts pressure on her knee.  She notes the bulk of the pain is on the outside portion of her knee.  She describes it as a tearing sensation.  Patient is  also having right thumb pain.  She was told by Rheumatology she had osteoarthritis in her right thumb.  Patient previously diagnosed with lupus and rheumatoid arthritis.  Patient lupus and rheumatoid have been pretty well-controlled.  She does note that she will get pain in her joints with minor injuries such as turning her head wrong.  Patient has associated stiffness.  She was not really noticed any swelling.    HA intermittent since . Had MRI that showed possible increased intracranial pressure.  Has continued to have headaches intermittently.  She does not have daily headaches.  She describes it as pressure in the back of her head.  Blood pressure has been normal at home.  She has been afebrile.  Denies any associated symptoms.      All other pertinent review of systems negative. Please see HPI for details.     Past Medical History:  has a past medical history of Anemia, Diabetes mellitus, type 2, Hypertension, Lupus (systemic lupus erythematosus) (), Other long term (current) drug therapy, Rheumatoid arthritis (), Statin intolerance (2023), and Vitamin D deficiency, unspecified.   Past Surgical History:  has a past surgical history that includes Carpal tunnel release;  section (); Colonoscopy (2018); Hysterectomy; and Tubal ligation (2006).  Family History: family history includes Arthritis in her father and sister; Asthma in her mother; Cancer in her maternal aunt, maternal grandmother, and paternal grandmother; Diabetes in her maternal aunt, maternal grandfather, and mother; Heart disease in her maternal aunt, maternal grandmother, and mother; Heart failure in her mother; Hyperlipidemia in her maternal aunt and maternal grandmother; Hypertension in her maternal aunt, maternal grandmother, and mother; Kidney disease in her maternal aunt; Miscarriages / Stillbirths in her mother; Thyroid disease in her mother; Vision loss in her maternal grandfather, maternal  grandmother, maternal uncle, and mother.  Social History:  reports that she has never smoked. She has never been exposed to tobacco smoke. She has never used smokeless tobacco. She reports that she does not drink alcohol and does not use drugs.  Allergies:   Review of patient's allergies indicates:   Allergen Reactions    Iodinated contrast media Shortness Of Breath and Rash    Iodine and iodide containing products Anaphylaxis    Iodine containing multivitamin Anaphylaxis    Cholecalciferol (vitamin d3)     Lisinopril Other (See Comments)    Methotrexate sodium     Shellfish derived     Statins-hmg-coa reductase inhibitors     Vicryl [sutures, polyglycolic acid]     Iodine Hives and Rash    Lincomycin Hives and Rash    Penicillins Rash     Other reaction(s): Rash, DifficultyBreathing         Current Outpatient Medications:     acetaminophen (TYLENOL) 500 MG tablet, Take 500 mg by mouth 2 (two) times daily as needed., Disp: , Rfl:     artificial tears ointment (REFRESH P.M.) Oint, Place into both eyes every evening., Disp: , Rfl:     busPIRone (BUSPAR) 5 MG Tab, Take 1 tablet by mouth three times a day as needed for anxiety, Disp: , Rfl:     cyclobenzaprine (FLEXERIL) 10 MG tablet, Take 1 tablet (10 mg total) by mouth as needed for Muscle spasms., Disp: 60 tablet, Rfl: 1    cycloSPORINE (RESTASIS) 0.05 % ophthalmic emulsion, Place 1 drop into both eyes 2 (two) times daily., Disp: , Rfl:     EScitalopram oxalate (LEXAPRO) 10 MG tablet, Take 10 mg by mouth once daily., Disp: , Rfl:     esomeprazole (NEXIUM) 20 MG capsule, Take 20 mg by mouth once daily., Disp: , Rfl:     ferrous sulfate (FEOSOL) 325 mg (65 mg iron) Tab tablet, Take 325 mg by mouth daily with breakfast., Disp: , Rfl:     fluticasone propionate (FLONASE) 50 mcg/actuation nasal spray, 1 spray (50 mcg total) by Each Nostril route once daily., Disp: 16 g, Rfl: 0    loratadine (CLARITIN) 10 mg tablet, Take 1 tablet (10 mg total) by mouth once daily. One tab  "daily for allergies, sinus drainage as needed, Disp: 90 tablet, Rfl: 3    meloxicam (MOBIC) 15 MG tablet, Take 1 tablet (15 mg total) by mouth daily., Disp: , Rfl:     metoprolol succinate (TOPROL-XL) 25 MG 24 hr tablet, Take 12.5 mg by mouth once daily., Disp: , Rfl:     mirabegron (MYRBETRIQ) 25 mg Tb24 ER tablet, Take one tablet at bedtime, Disp: 30 tablet, Rfl: 11    olmesartan (BENICAR) 5 MG Tab, Take 5 mg by mouth once daily., Disp: , Rfl:     olopatadine (PATADAY ONCE DAILY RELIEF) 0.2 % Drop, Place 1 drop into both eyes once daily., Disp: , Rfl:     pitavastatin magnesium (ZYPITAMAG) 4 mg Tab, Take 4 mg by mouth once daily., Disp: , Rfl:     RESTASIS MULTIDOSE 0.05 % Drop, Instill 1 drop into both eyes twice a day as directed, Disp: , Rfl:     tamsulosin (FLOMAX) 0.4 mg Cap, Take 1 capsule (0.4 mg total) by mouth once daily., Disp: 90 capsule, Rfl: 1    triamcinolone acetonide 0.1% (KENALOG) 0.1 % cream, Apply topically 2 (two) times daily., Disp: 45 g, Rfl: 0    ezetimibe (ZETIA) 10 mg tablet, Take 1 tablet (10 mg total) by mouth once daily. (Patient not taking: Reported on 1/2/2024), Disp: 90 tablet, Rfl: 3  No current facility-administered medications for this visit.   OBJECTIVE:     Vital Signs   /86 (BP Location: Left arm, Patient Position: Sitting, BP Method: Medium (Manual))   Pulse 65   Temp 98.2 °F (36.8 °C) (Oral)   Resp 18   Ht 4' 10" (1.473 m)   Wt 65.7 kg (144 lb 12.8 oz)   SpO2 100%   BMI 30.26 kg/m²     Physical Exam  Constitutional:       General: She is not in acute distress.     Appearance: Normal appearance. She is not ill-appearing, toxic-appearing or diaphoretic.   HENT:      Head: Normocephalic and atraumatic.      Right Ear: Tympanic membrane normal.      Left Ear: Tympanic membrane normal.      Nose: No congestion or rhinorrhea.      Mouth/Throat:      Mouth: Mucous membranes are moist.      Pharynx: No oropharyngeal exudate or posterior oropharyngeal erythema.   Eyes:    "   Extraocular Movements: Extraocular movements intact.      Pupils: Pupils are equal, round, and reactive to light.   Cardiovascular:      Rate and Rhythm: Normal rate and regular rhythm.      Pulses: Normal pulses.      Heart sounds: Normal heart sounds. No murmur heard.     No friction rub. No gallop.   Pulmonary:      Effort: Pulmonary effort is normal. No respiratory distress.      Breath sounds: No wheezing, rhonchi or rales.   Abdominal:      General: Abdomen is flat.      Palpations: Abdomen is soft.      Tenderness: There is no abdominal tenderness. There is no guarding or rebound.   Musculoskeletal:         General: Normal range of motion.      Cervical back: Normal range of motion.      Comments: Tenderness to palpation over cervical spinal process.  Strength and sensation intact.  Minimal tenderness to palpation over right knee along lateral joint line.  No crepitance, swelling, or laxity appreciated.  Right thumb with mild swelling over metacarpophalangeal joint, mild tenderness to palpation.   Skin:     General: Skin is warm and dry.      Capillary Refill: Capillary refill takes less than 2 seconds.   Neurological:      General: No focal deficit present.      Mental Status: She is alert.   Psychiatric:         Mood and Affect: Mood normal.         Behavior: Behavior normal.         ASSESSMENT/PLAN:     1. Neck pain  -     X-Ray Cervical Spine 2 or 3 Views; Future; Expected date: 02/12/2024    2. Systemic lupus erythematosus with glomerular disease, unspecified SLE type  -     ketorolac injection 30 mg  -     dexAMETHasone injection 4 mg    3. Rheumatoid arthritis involving multiple sites, unspecified whether rheumatoid factor present    Patient previously diagnosed with lupus and rheumatoid arthritis.  It is very possible patient was having a flare.  We will provide Toradol and Decadron for symptomatic relief.  Patient having some neck pain which has been consistent.  We will obtain x-ray for further  evaluation given intermittent radicular symptoms.  Patient also with intermittent occipital headaches.  Blood pressure initially elevated but improved upon recheck.  Patient's blood pressure is normal at home.  Patient's previous MRI showed concern for possible intracranial hypertension.  If symptoms do not resolve with Toradol/Decadron and her x-ray of her neck is normal, can consider referring to Neurology for further evaluation.    Follow up if symptoms worsen or fail to improve.      LEE GOMEZ MD  02/12/2024    Due to voice recognition software, sound alike and misspelled words may be contained in the documentation.

## 2024-02-12 NOTE — PROGRESS NOTES
Health Maintenance Due   Topic Date Due    Pneumococcal Vaccines (Age 0-64) (1 of 2 - PCV) Never done    TETANUS VACCINE  05/14/2017    COVID-19 Vaccine (3 - 2023-24 season) 09/01/2023    Eye Exam  03/03/2024    Foot Exam  03/28/2024    Mammogram  05/04/2024     Patient declined care gaps. Patient got her flu shot in December at [facility].

## 2024-02-13 ENCOUNTER — APPOINTMENT (OUTPATIENT)
Dept: RADIOLOGY | Facility: CLINIC | Age: 49
End: 2024-02-13
Attending: STUDENT IN AN ORGANIZED HEALTH CARE EDUCATION/TRAINING PROGRAM
Payer: MEDICARE

## 2024-02-13 DIAGNOSIS — M54.2 NECK PAIN: ICD-10-CM

## 2024-02-13 PROCEDURE — 72040 X-RAY EXAM NECK SPINE 2-3 VW: CPT | Mod: 26,,, | Performed by: RADIOLOGY

## 2024-02-13 PROCEDURE — 72040 X-RAY EXAM NECK SPINE 2-3 VW: CPT | Mod: TC,RHCUB,FY | Performed by: STUDENT IN AN ORGANIZED HEALTH CARE EDUCATION/TRAINING PROGRAM

## 2024-02-14 NOTE — PROGRESS NOTES
Please let patient know that she has mild narrowing of the C5/C6 disc space.  If her pain persists, we can refer to physical therapy and possibly obtain MRI  of her neck in the future. Please ensure patient's pain has improved since her office visit.

## 2024-02-15 DIAGNOSIS — M54.2 NECK PAIN: Primary | ICD-10-CM

## 2024-02-21 ENCOUNTER — CLINICAL SUPPORT (OUTPATIENT)
Dept: REHABILITATION | Facility: HOSPITAL | Age: 49
End: 2024-02-21
Payer: MEDICARE

## 2024-02-21 DIAGNOSIS — M54.2 NECK PAIN: ICD-10-CM

## 2024-02-21 PROCEDURE — 97161 PT EVAL LOW COMPLEX 20 MIN: CPT | Mod: PN

## 2024-02-21 PROCEDURE — 97110 THERAPEUTIC EXERCISES: CPT | Mod: PN

## 2024-02-21 NOTE — PLAN OF CARE
[facility]   Physical Therapy Initial Evaluation    Date: 2024   Name: Jorge Ivory  Clinic Number: [id number]    Therapy Diagnosis:   Encounter Diagnosis   Name Primary?    Neck pain      Physician: Karolina Arias MD    Physician Orders: PT Eval and Treat    Medical Diagnosis from Referral: neck pain   Evaluation Date: 2024  Updated Plan of Care Due : 2024  Authorization Period Expiration: medicare   Plan of Care Expiration: 3/21/2024  Visit # / Visits authorized:     Time In: 1100  Time Out: 1145  Total Appointment Time (timed & untimed codes): 45 minutes    Precautions: Standard    Subjective   Date of onset: pt states she has been having neck pain and head aches for a while, neck pain has worsened in the last two months, pt voices her shoulder has started hurting about 2 months ago as well.  Pt voices she has recently gotten an x ray with spinal stenosis at c5c6.  Pt voices she has noticed a decrease  in her  strength as well.      History of current condition - Sincere reports: hx below      Medical History:   Past Medical History:   Diagnosis Date    Anemia     Diabetes mellitus, type 2     Hypertension     Lupus (systemic lupus erythematosus) 2019    Other long term (current) drug therapy     Rheumatoid arthritis 2015    Statin intolerance 2023    Due to drug induced myalgia    Vitamin D deficiency, unspecified        Surgical History:   Sincere AURA Ivory  has a past surgical history that includes Carpal tunnel release;  section (); Colonoscopy (2018); Hysterectomy; and Tubal ligation (2006).    Medications:   Sincere has a current medication list which includes the following prescription(s): acetaminophen, artificial tears ointment, buspirone, cyclobenzaprine, cyclosporine, escitalopram oxalate, esomeprazole, ezetimibe, ferrous sulfate, fluticasone propionate, loratadine, meloxicam, metoprolol succinate, myrbetriq,  olmesartan, olopatadine, zypitamag, restasis multidose, tamsulosin, and triamcinolone acetonide 0.1%.    Allergies:   Review of patient's allergies indicates:   Allergen Reactions    Iodinated contrast media Shortness Of Breath and Rash    Iodine and iodide containing products Anaphylaxis    Iodine containing multivitamin Anaphylaxis    Cholecalciferol (vitamin d3)     Lisinopril Other (See Comments)    Methotrexate sodium     Shellfish derived     Statins-hmg-coa reductase inhibitors     Vicryl [sutures, polyglycolic acid]     Iodine Hives and Rash    Lincomycin Hives and Rash    Penicillins Rash     Other reaction(s): Rash, DifficultyBreathing        Imaging, bone scan films:      Prior Therapy: none recent   Social History:    lives with their family  Occupation: disability   Prior Level of Function: independent chronic health problems   Current Level of Function: cervical pain and shoulder pain with right upper extremity weakness     Pain:  Current 5/10, worst 10/10, best 4/10   Location: right upper extremity weakness and neck pain       Description: Aching, Dull, Grabbing, Tight, Deep, Sharp, Electric, and Shooting  Aggravating Factors: Sitting  Easing Factors: nothing    Patients goals: be able to decrease headache frequency,  decrease pain , increase shoulder range of motion and          Objective     Posture:   Forward head: increased  Thoracic curve: increased  Cervical curve: increased  Laterally flexed: left  Rotated: right  Rounded shoulders: yes    Scoliosis: yes       Cervical AROM:      SBL:     45  SBR:   20  RL:      21  RR:      21    MMT Right  Left    C1-C2 Chin up MMT strength: 3+/5 MMT strength: 3+/5   C1-C2 Chin in MMT shoulder: 3+/5 MMT strength: 3+/5   C3 Lateral flexion MMT strength: 3/5 MMT strength: 3/5   C4 Shoulder Shrug MMT strength: 3+/5 MMT strength: 3+/5   C5 Biceps MMT strength: 3+/5 MMT strength: 3+/5   C6 Wrist extension MMT strength: 3+/5 MMT strength: 3+/5   C7 Triceps  MMT strength: 3+/5 MMT strength: 3+/5   Other MMT strength: 3+/5 MMT strength: 3+/5     Shoulder AROM Right  Left    Flexion (180) 130 170   Internal rotation (45)1 45 45   External rotation (45) 45 45   Abduction (180) 130 170                     Segment/Mobility:     Special test:    Compression test Negative   Thoracic outlet  Negative   Distraction Negative                 Palpation Body side Positive/negative Increased tone   Sternocleidomastoid bilateral Positive Yes   Scalenes bilateral Positive Yes   1st rib bilateral Positive Yes   Upper traps bilateral Positive Yes   Suboccipitals bilateral Positive Yes   Transverse process bilateral Positive Yes   Spinous process right Positive Yes   Pec Minor right Positive Yes   Masseter bilateral Negative Yes   Mastoid process right Positive Yes                 Other tests/information:    Intake outcome measure for FOTO cervical  Survey    Therapist reviewed FOTO scores for Sincere AURA Ivory on 2/21/2024.   FOTO documents entered into EP[employer] - see Media section.    Intake Score: 36%         TREATMENT        Sincere received the treatments listed below:  THERAPEUTIC EXERCISES to develop strength, endurance, ROM, flexibility, and posture for 20 minutes including home ex program     Home Exercises and Patient Education Provided    Education provided:   - range of motion and strength     Written Home Exercises Provided: Patient instructed to cont prior HEP.  Exercises were reviewed and Sincere was able to demonstrate them prior to the end of the session.  Sincere demonstrated good  understanding of the education provided.     See EMR under Patient Instructions for exercises provided 2/21/2024.    Assessment   Sincere is a 48 y.o. female referred to outpatient Physical Therapy with a medical diagnosis of neck pain . Pt presents with decreased range of motion and strength , neck pain , scapular weakness causing decreased neck posture and decreased right shoulder range  of motion     Pt prognosis is Excellent.   Pt will benefit from skilled outpatient Physical Therapy to address the deficits stated above and in the chart below, provide pt/family education, and to maximize pt's level of independence.     Plan of care discussed with patient: Yes  Pt's spiritual, cultural and educational needs considered and patient is agreeable to the plan of care and goals as stated below:     Anticipated Barriers for therapy: medical diagnosis of neck pain . Pt presents with decreased range of motion and strength , neck pain , scapular weakness causing decreased neck posture and decreased right shoulder range of motion         Goals:  Short Term Goals: 4 weeks   Pt will be independent with home ex program   Pt will increase cervical rotation to 60 degrees bilaterally   Improve lateral tilt to 45 degrees bilaterally   Pt will decrease pain from 6/10 to 4/10   Increase right shoulder range of motion flexion to 160 degrees     Long Term Goals: 6 weeks   Pt will improve pain to 2/10 at rest   Pt will be able to be able to sit greater than 4 hours without headaches   Increase upper extremity strength to 4/5     Plan   Plan of care Certification: 2/21/2024 to 3/21/2024.    Outpatient Physical Therapy 2 times weekly for 4 weeks to include the following interventions: Electrical Stimulation ifc , Manual Therapy, Moist Heat/ Ice, Neuromuscular Re-ed, Patient Education, Therapeutic Activities, Therapeutic Exercise, and modaltiies as needed  .     Plan of care has been reestablished with Josefina JUNIOR, Ángela Baugh LPTA, Sahara Gilbert LPTA  and Siri JUNIOR.       Kiran Talamantes, PT

## 2024-02-23 ENCOUNTER — CLINICAL SUPPORT (OUTPATIENT)
Dept: REHABILITATION | Facility: HOSPITAL | Age: 49
End: 2024-02-23
Payer: MEDICARE

## 2024-02-23 DIAGNOSIS — M53.82 DECREASED ROM OF INTERVERTEBRAL DISCS OF CERVICAL SPINE: ICD-10-CM

## 2024-02-23 DIAGNOSIS — M54.2 NECK PAIN: Primary | ICD-10-CM

## 2024-02-23 PROCEDURE — 97035 APP MDLTY 1+ULTRASOUND EA 15: CPT | Mod: PN

## 2024-02-23 PROCEDURE — 97112 NEUROMUSCULAR REEDUCATION: CPT | Mod: PN

## 2024-02-23 NOTE — PROGRESS NOTES
Physical Therapy Treatment Note     Name: Sinceandriy Ivory  Clinic Number: [id number]    Therapy Diagnosis: No diagnosis found.  Physician: Karolina Arias MD    Visit Date: 2/23/2024    Physician Orders: PT Eval and Treat    Medical Diagnosis from Referral: neck pain   Evaluation Date: 2/21/2024  Updated Plan of Care Due : 4/21/2024  Authorization Period Expiration: medicare   Plan of Care Expiration: 3/21/2024  Visit # / Visits authorized: 2/ 20  Time In: 800  Time Out: 845  Total Billable Time: 45 minutes    Precautions: Standard internal heart monitor left side        Subjective     Pt reports: pt voices she was sore in her neck from stretching .  She was compliant with home exercise program.  Response to previous treatment: improved less pain   Functional change: increased range of motion     Pain: 4/10  Location: right shoulder and neck         Objective         Sincere participated in neuromuscular re-education activities to improve: Proprioception and Posture for 25 minutes. The following activities were included:    Shoulder pulleys x 5 minutes stretching into end range of motion  Ube x 5 min   Scapular retraction with blue x 20 reps   Cervical rotation stretch x 10 reps bilaterally   Cervical  lateral tilt x 10 reps bilaterally   Corner stretch x 10 sec x 5 reps   Prone scapular retraction x 10 reps   Hand behind back in internal rotation with contralateral head rotation x 10 reps each side     Range of motion cervical   rotation              right         60        left      65                                                Lateral tilt          right          27       left       35    Shoulder flexion              right   152             left        155    Sincere received the following direct contact modalities after being cleared for contraindications: Ultrasound:  Sincere received ultrasound to manage pain and inflammation at 100 % duty cycle applied to the bilateral  at an intensity of   1.8 W/cm2  for a duration of 8 minutes. Patient tolerated treatment well without adverse effects. Therapist was in attendance throughout intervention.      Home Exercises Provided and Patient Education Provided     Education provided: home ex program     Written Home Exercises Provided: Patient instructed to cont prior HEP.  Exercises were reviewed and Sincere was able to demonstrate them prior to the end of the session.  Sincere demonstrated good  understanding of the education provided.     See EMR under Patient Instructions for exercises provided prior visit.    Assessment     Pt improved with cervical and shoulder range of motion   Sincere Is progressing well towards her goals.   Pt prognosis is Excellent.     Pt will continue to benefit from skilled outpatient physical therapy to address the deficits listed in the problem list box on initial evaluation, provide pt/family education and to maximize pt's level of independence in the home and community environment.     Pt's spiritual, cultural and educational needs considered and pt agreeable to plan of care and goals.     Anticipated Barriers for therapy: medical diagnosis of neck pain . Pt presents with decreased range of motion and strength , neck pain , scapular weakness causing decreased neck posture and decreased right shoulder range of motion            Goals:  Short Term Goals: 4 weeks   Pt will be independent with home ex program   Pt will increase cervical rotation to 60 degrees bilaterally   Improve lateral tilt to 45 degrees bilaterally   Pt will decrease pain from 6/10 to 4/10   Increase right shoulder range of motion flexion to 160 degrees      Long Term Goals: 6 weeks   Pt will improve pain to 2/10 at rest   Pt will be able to be able to sit greater than 4 hours without headaches   Increase upper extremity strength to 4/5      Plan   Plan of care Certification: 2/21/2024 to 3/21/2024.     Outpatient Physical Therapy 2 times weekly for 4 weeks to  include the following interventions: Electrical Stimulation ifc , Manual Therapy, Moist Heat/ Ice, Neuromuscular Re-ed, Patient Education, Therapeutic Activities, Therapeutic Exercise, and modaltiies as needed  .      Plan of care has been reestablished with Josefina JUNIOR, Ángela JUNIOR, Sahara JUNIOR  and Siri JUNIOR.        Kiran Talamantes, PT  2/23/2024

## 2024-02-23 NOTE — PROGRESS NOTES
Subjective     Patient ID: Jorge Ivory is a 48 y.o. female.    Chief Complaint: No chief complaint on file.    This pleasant 48 year old female presents to the clinic as a new patient referral from Dr. MAXIME Arias for urinary retention. Patient states symptoms started in 2018 and was previously evaluated by Dr. Dowd at [facility]. She reports having a cystoscopy at that time and started on Oxybutynin 10 mg. She reports this medication was replaced by flomax 0.4 mg daily. She is tolerating the medication without side effects. She reports mixed incontinence with urge and stress incontinence. We discussed doing the Kegel exercises and she was given samples of Myrbetriq 50 mg one daily and will take the Flomax 0.4 mg one at night. She reports having suspected glaucoma and is routinely being evaluated for the glaucoma. She also has Lupus. She reports frequency, urgency, and nocturia 4 to 5 times a night. She reports incomplete bladder emptying and feels like she still needs to urinate after voiding. We discussed double voiding. Her PVR was 0 mls at today's visit. She denies dysuria or hematuria. She denies any abdominal, bladder or back pain. She reports having a UTI in August 2023 that was treated with antibiotics but is unsure which one. There was not a urine culture in the [facility] system. She reports having a partial hysterectomy and bladder tac by OB/GYN Dr. Ricardo Dennis in 2009. She denies smoking or drinking alcohol. I will culture her urine and treat if indicated. She again will continue the flomax 0.4 mg one at night and try the Myrbetriq 50 mg one daily in addition to the kegel exercises. I discussed the plan with Urologist Dr. PORTIA Levy and the patient and they are in agreement with the plan. All her questions were answered at today's visit. I spent 30 minutes counseling this patient.  ---------------  [November 27, 2023].           This pleasant 48 year old female presents to the clinic for follow  up of mixed urinary incontinence and incomplete bladder emptying.  Patient states she is doing good.  She denies any new urological complaints. She reports urinary incontinence is greatly improved and controlled with the Myrbetriq 25 mg.  She is tolerating this medication without side effects. She is pleased with the treatment of her incontinence and bladder emptying.  She desires to continue the current management of both. She is on Flomax and we discussed this.  She desires to come off the Flomax and will stop this medication.  If her symptoms get worse then we will restart the Flomax.  She does report nocturia 1 to 3 times a night,  some urge incontinence that is improved per the patient and incomplete bladder emptying.  Her PVR is 11 mls.  She denies dysuria, hematuria, or frequency.  She denies fever, chills, nausea or vomiting. She denies any bladder or abdominal pain but does report chronic back pain and is doing physical therapy for the back pain. Her urine culture in November 2023 was negative. She reports having a partial hysterectomy and bladder tac by OB/GYN Dr. Ricardo Dennis in 2009. She denies smoking or drinking alcohol.  I discussed the plan in detail with the patient and she is in agreement with the plan.  All her questions were answered at today's visit.  -----------------------------------------------------------------------------------------  [February 27, 2024].         Review of Systems   Constitutional:  Negative for activity change and fever.   HENT:  Negative for hearing loss and trouble swallowing.    Eyes:  Negative for visual disturbance.   Respiratory:  Negative for cough, shortness of breath and wheezing.    Cardiovascular:  Negative for chest pain.   Gastrointestinal:  Negative for abdominal pain, diarrhea, nausea and vomiting.   Endocrine: Negative for polyuria.   Genitourinary:  Positive for bladder incontinence and nocturia. Negative for decreased urine volume, difficulty  urinating, dysuria, enuresis, flank pain, frequency, hematuria and urgency.   Musculoskeletal:  Negative for back pain and gait problem.   Integumentary:  Negative for rash.   Neurological:  Negative for speech difficulty and weakness.   Psychiatric/Behavioral:  Negative for behavioral problems and confusion.           Objective     Physical Exam  Vitals and nursing note reviewed.   Constitutional:       General: She is not in acute distress.     Appearance: Normal appearance. She is not ill-appearing, toxic-appearing or diaphoretic.   HENT:      Head: Normocephalic.   Eyes:      Extraocular Movements: Extraocular movements intact.   Cardiovascular:      Rate and Rhythm: Normal rate and regular rhythm.      Heart sounds: Normal heart sounds.   Pulmonary:      Effort: Pulmonary effort is normal.      Breath sounds: Normal breath sounds. No wheezing, rhonchi or rales.   Abdominal:      General: Bowel sounds are normal.      Palpations: Abdomen is soft.      Tenderness: There is no abdominal tenderness. There is no right CVA tenderness, left CVA tenderness, guarding or rebound.   Musculoskeletal:         General: Normal range of motion.      Cervical back: Normal range of motion. No rigidity.   Skin:     General: Skin is warm and dry.   Neurological:      General: No focal deficit present.      Mental Status: She is alert and oriented to person, place, and time.      Motor: No weakness.      Coordination: Coordination normal.      Gait: Gait normal.   Psychiatric:         Mood and Affect: Mood normal.         Behavior: Behavior normal.         Thought Content: Thought content normal.        Assessment and Plan     Problem List Items Addressed This Visit          Renal/    Mixed incontinence urge and stress - Primary    Nocturia        Continue Myrbetriq 25 mg one at bedtime   Stop the Flomax, will restart if needed   Follow up with Urology in 6 months or sooner if needed

## 2024-02-26 ENCOUNTER — CLINICAL SUPPORT (OUTPATIENT)
Dept: REHABILITATION | Facility: HOSPITAL | Age: 49
End: 2024-02-26
Payer: MEDICARE

## 2024-02-26 DIAGNOSIS — M54.2 NECK PAIN: Primary | ICD-10-CM

## 2024-02-26 PROCEDURE — 97035 APP MDLTY 1+ULTRASOUND EA 15: CPT | Mod: PN,CQ

## 2024-02-26 PROCEDURE — 97112 NEUROMUSCULAR REEDUCATION: CPT | Mod: PN,CQ

## 2024-02-26 NOTE — PROGRESS NOTES
Physical Therapy Treatment Note     Name: Sinceandriy Ivory  Clinic Number: [id number]    Therapy Diagnosis: No diagnosis found.  Physician: Karolina Arias MD    Visit Date: 2/26/2024    Physician Orders: PT Eval and Treat    Medical Diagnosis from Referral: neck pain   Evaluation Date: 2/21/2024  Updated Plan of Care Due : 4/21/2024  Authorization Period Expiration: medicare   Plan of Care Expiration: 3/21/2024  Visit # / Visits authorized: 3 20    Time In: 800  Time Out: 838  Total Billable Time: 38 minutes    Precautions: Standard internal heart monitor left side        Subjective     Pt reports: I'm ok this am  She was compliant with home exercise program.  Response to previous treatment: improved less pain   Functional change: increased range of motion     Pain: 2/10  Location: right shoulder and neck         Objective         Sincere participated in neuromuscular re-education activities to improve: Proprioception and Posture for 30 minutes. The following activities were included:    Shoulder pulleys x 5 minutes stretching into end range of motion  Ube x 5 min   Doorway pectoralis stretch with bilateral rotation x 10  Scapular retraction with blue x 10 reps   Scapular retraction thumbs out with blue x 10  Serratus stretch off cybex x 8  Bilateral side lying head lifts x 10  Cervical rotation stretch x 10 reps bilaterally    Corner stretch x 10 sec x 5 reps   Prone scapular retraction x 10 reps   Sitting dowel flexion x 10    Range of motion cervical   rotation              right         60        left      65                                                Lateral tilt          right          27       left       32    Shoulder flexion              right   152             left        155    Sincere received the following direct contact modalities after being cleared for contraindications: Ultrasound:  Sincere received ultrasound to manage pain and inflammation at 100 % duty cycle applied to the right  upper traps  at  1.5 W/cm2/ 1 mhz  for a duration of 8 minutes. Patient tolerated treatment well without adverse effects. Therapist was in attendance throughout intervention.      Home Exercises Provided and Patient Education Provided     Education provided: home ex program     Written Home Exercises Provided: Patient instructed to cont prior HEP.  Exercises were reviewed and Sincere was able to demonstrate them prior to the end of the session.  Sincere demonstrated good  understanding of the education provided.     See EMR under Patient Instructions for exercises provided prior visit.    Assessment     Physical therapy voicing decreased pain 0/10 after treatment   Sincere Is progressing well towards her goals.   Pt prognosis is Excellent.     Pt will continue to benefit from skilled outpatient physical therapy to address the deficits listed in the problem list box on initial evaluation, provide pt/family education and to maximize pt's level of independence in the home and community environment.     Pt's spiritual, cultural and educational needs considered and pt agreeable to plan of care and goals.     Anticipated Barriers for therapy: medical diagnosis of neck pain . Pt presents with decreased range of motion and strength , neck pain , scapular weakness causing decreased neck posture and decreased right shoulder range of motion            Goals:  Short Term Goals: 4 weeks   Pt will be independent with home ex program   Pt will increase cervical rotation to 60 degrees bilaterally   Improve lateral tilt to 45 degrees bilaterally   Pt will decrease pain from 6/10 to 4/10   Increase right shoulder range of motion flexion to 160 degrees      Long Term Goals: 6 weeks   Pt will improve pain to 2/10 at rest   Pt will be able to be able to sit greater than 4 hours without headaches   Increase upper extremity strength to 4/5      Plan   Plan of care Certification: 2/21/2024 to 3/21/2024.     Outpatient Physical Therapy 2  times weekly for 4 weeks to include the following interventions: Electrical Stimulation ifc , Manual Therapy, Moist Heat/ Ice, Neuromuscular Re-ed, Patient Education, Therapeutic Activities, Therapeutic Exercise, and modaltiies as needed  .      Plan of care has been reestablished with Josefina JUNIOR, Ángela JUNIOR, Sahara Gilbert LPTA  and Siri Melo LPTA.        Yancy Rosenbaum, PTA  2/26/2024

## 2024-02-27 ENCOUNTER — OFFICE VISIT (OUTPATIENT)
Dept: UROLOGY | Facility: CLINIC | Age: 49
End: 2024-02-27
Payer: MEDICARE

## 2024-02-27 VITALS
SYSTOLIC BLOOD PRESSURE: 130 MMHG | RESPIRATION RATE: 18 BRPM | TEMPERATURE: 98 F | HEART RATE: 65 BPM | HEIGHT: 58 IN | WEIGHT: 144 LBS | DIASTOLIC BLOOD PRESSURE: 60 MMHG | BODY MASS INDEX: 30.23 KG/M2 | OXYGEN SATURATION: 98 %

## 2024-02-27 DIAGNOSIS — N39.46 MIXED INCONTINENCE URGE AND STRESS: Primary | ICD-10-CM

## 2024-02-27 DIAGNOSIS — R35.1 NOCTURIA: ICD-10-CM

## 2024-02-27 PROCEDURE — 99213 OFFICE O/P EST LOW 20 MIN: CPT | Mod: S$PBB,,, | Performed by: NURSE PRACTITIONER

## 2024-02-27 PROCEDURE — 99215 OFFICE O/P EST HI 40 MIN: CPT | Mod: PBBFAC | Performed by: NURSE PRACTITIONER

## 2024-02-27 NOTE — PATIENT INSTRUCTIONS
Continue Myrbetriq 25 mg one at bedtime   Stop the Flomax, will restart if needed   Follow up with Urology in 6 months or sooner if needed

## 2024-02-29 ENCOUNTER — CLINICAL SUPPORT (OUTPATIENT)
Dept: REHABILITATION | Facility: HOSPITAL | Age: 49
End: 2024-02-29
Payer: MEDICARE

## 2024-02-29 DIAGNOSIS — M54.2 NECK PAIN: Primary | ICD-10-CM

## 2024-02-29 DIAGNOSIS — M53.82 DECREASED ROM OF INTERVERTEBRAL DISCS OF CERVICAL SPINE: ICD-10-CM

## 2024-02-29 PROCEDURE — 97035 APP MDLTY 1+ULTRASOUND EA 15: CPT | Mod: PN,CQ

## 2024-02-29 PROCEDURE — 97112 NEUROMUSCULAR REEDUCATION: CPT | Mod: PN,CQ

## 2024-02-29 NOTE — PROGRESS NOTES
Physical Therapy Treatment Note     Name: Sincere AURA Ivory  [facility] Number: [id number]    Therapy Diagnosis:   Encounter Diagnoses   Name Primary?    Neck pain Yes    Decreased ROM of intervertebral discs of cervical spine      Physician: Karolina Arias MD    Visit Date: 2/29/2024    Physician Orders: PT Eval and Treat    Medical Diagnosis from Referral: neck pain   Evaluation Date: 2/21/2024  Updated Plan of Care Due : 4/21/2024  Authorization Period Expiration: medicare   Plan of Care Expiration: 3/21/2024  Visit # / Visits authorized: 4/ 20  PTA VISIT# 2     Time In: 845  Time Out: 923  Total Billable Time: 38 minutes    Precautions: Standard internal heart monitor left side        Subjective     Pt reports: I'm having some neck pain this am.   She was compliant with home exercise program.  Response to previous treatment: improved less pain   Functional change: increased range of motion     Pain: 3/10  Location: right shoulder and neck         Objective       Sincere participated in neuromuscular re-education activities to improve: Proprioception and Posture for 30 minutes. The following activities were included:    Shoulder pulleys x 5 minutes stretching into end range of motion  Ube x 5 min   Doorway pectoralis stretch with bilateral rotation x 10  Scapular retraction with blue x 10 reps   Scapular retraction thumbs out with blue x 10  Serratus stretch off cybex x 8  Bilateral side lying head lifts x 10  Cervical rotation stretch x 10 reps bilaterally    Corner stretch x 10 sec x 5 reps   Prone scapular retraction x 10 reps   Supine dowel flexion x 10    Range of motion cervical   rotation              right         62        left      65                                                Lateral tilt          right          28       left       32    Shoulder flexion              right   156             left        155    Sincere received the following direct contact modalities after being cleared for  contraindications: Ultrasound:  Sincere received ultrasound to manage pain and inflammation at 100 % duty cycle applied to the right upper traps  at  1.5 W/cm2/ 3 mhz  for a duration of 8 minutes. Patient tolerated treatment well without adverse effects. Therapist was in attendance throughout intervention.      Home Exercises Provided and Patient Education Provided     Education provided: home ex program     Written Home Exercises Provided: Patient instructed to cont prior HEP.  Exercises were reviewed and Sincere was able to demonstrate them prior to the end of the session.  Sincere demonstrated good  understanding of the education provided.     See EMR under Patient Instructions for exercises provided prior visit.    Assessment     Physical therapy voicing decreased pain 1/10 after treatment, had slight improvements with range of motion to the right.  Sincere Is progressing well towards her goals.   Pt prognosis is Excellent.     Pt will continue to benefit from skilled outpatient physical therapy to address the deficits listed in the problem list box on initial evaluation, provide pt/family education and to maximize pt's level of independence in the home and community environment.     Pt's spiritual, cultural and educational needs considered and pt agreeable to plan of care and goals.     Anticipated Barriers for therapy: medical diagnosis of neck pain . Pt presents with decreased range of motion and strength , neck pain , scapular weakness causing decreased neck posture and decreased right shoulder range of motion      Goals:  Short Term Goals: 4 weeks   Pt will be independent with home ex program   Pt will increase cervical rotation to 60 degrees bilaterally   Improve lateral tilt to 45 degrees bilaterally   Pt will decrease pain from 6/10 to 4/10   Increase right shoulder range of motion flexion to 160 degrees      Long Term Goals: 6 weeks   Pt will improve pain to 2/10 at rest   Pt will be able to be able to  sit greater than 4 hours without headaches   Increase upper extremity strength to 4/5      Plan   Plan of care Certification: 2/21/2024 to 3/21/2024.     Outpatient Physical Therapy 2 times weekly for 4 weeks to include the following interventions: Electrical Stimulation ifc , Manual Therapy, Moist Heat/ Ice, Neuromuscular Re-ed, Patient Education, Therapeutic Activities, Therapeutic Exercise, and modaltiies as needed  .      Plan of care has been reestablished with Josefina JUNIOR, Ángela Baugh LPTA, Sahara Gilbert LPTA  and Siri Melo LPTA.        Yancy Rosenbaum, PTA  2/29/2024

## 2024-03-04 ENCOUNTER — CLINICAL SUPPORT (OUTPATIENT)
Dept: REHABILITATION | Facility: HOSPITAL | Age: 49
End: 2024-03-04
Payer: MEDICARE

## 2024-03-04 DIAGNOSIS — M53.82 DECREASED ROM OF INTERVERTEBRAL DISCS OF CERVICAL SPINE: ICD-10-CM

## 2024-03-04 DIAGNOSIS — M54.2 NECK PAIN: Primary | ICD-10-CM

## 2024-03-04 PROCEDURE — 97035 APP MDLTY 1+ULTRASOUND EA 15: CPT | Mod: PN,CQ

## 2024-03-04 PROCEDURE — 97112 NEUROMUSCULAR REEDUCATION: CPT | Mod: PN,CQ

## 2024-03-04 NOTE — PROGRESS NOTES
Physical Therapy Treatment Note     Name: Sincere AURA Ivory  [facility] Number: [id number]    Therapy Diagnosis:   Encounter Diagnoses   Name Primary?    Neck pain Yes    Decreased ROM of intervertebral discs of cervical spine      Physician: Angie Underwood FNP    Visit Date: 3/4/2024    Physician Orders: PT Eval and Treat    Medical Diagnosis from Referral: neck pain   Evaluation Date: 2/21/2024  Updated Plan of Care Due : 4/21/2024  Authorization Period Expiration: medicare   Plan of Care Expiration: 3/21/2024  Visit # / Visits authorized: 5/ 20  PTA VISIT# 3    Time In: 900  Time Out: 938  Total Billable Time: 38 minutes    Precautions: Standard internal heart monitor left side      Subjective     Pt reports:I think I slept wrong and I hurt at the base of head.  She was compliant with home exercise program.  Response to previous treatment: improved less pain   Functional change: increased range of motion     Pain: 3/10  Location: right shoulder and neck         Objective       Sincere participated in neuromuscular re-education activities to improve: Proprioception and Posture for 30 minutes. The following activities were included:    Shoulder pulleys x 5 minutes stretching into end range of motion  Ube x 5 min   Doorway pectoralis stretch with bilateral rotation x 10  Scapular retraction with blue x 10 reps   Scapular retraction thumbs out with blue x 10  Serratus stretch off cybex x 8  Bilateral side lying head lifts x 10  Cervical rotation stretch x 10 reps bilaterally    Corner stretch x 10 sec x 5 reps       Range of motion cervical   rotation              right         62        left      65                                                Lateral tilt          right          28       left       32  Shoulder flexion                     right   156             left        155    Sincere received the following direct contact modalities after being cleared for contraindications: Ultrasound:  Sincere received  ultrasound to manage pain and inflammation at 100 % duty cycle applied to the right upper traps  at  1.5 W/cm2/ 3 mhz  for a duration of 8 minutes. Patient tolerated treatment well without adverse effects. Therapist was in attendance throughout intervention.    Patient was placed on occipital pad for 5' to allow for cervical MFR     Home Exercises Provided and Patient Education Provided     Education provided: home ex program     Written Home Exercises Provided: Patient instructed to cont prior HEP.  Exercises were reviewed and Sincere was able to demonstrate them prior to the end of the session.  Sincere demonstrated good  understanding of the education provided.     See EMR under Patient Instructions for exercises provided prior visit.    Assessment   Patient voicing decreased pain 1/10 after treatment. Discussed with patient purchasing occipital pad for home use.  Sincere Is progressing well towards her goals.   Pt prognosis is Excellent.     Pt will continue to benefit from skilled outpatient physical therapy to address the deficits listed in the problem list box on initial evaluation, provide pt/family education and to maximize pt's level of independence in the home and community environment.     Pt's spiritual, cultural and educational needs considered and pt agreeable to plan of care and goals.     Anticipated Barriers for therapy: medical diagnosis of neck pain . Pt presents with decreased range of motion and strength , neck pain , scapular weakness causing decreased neck posture and decreased right shoulder range of motion      Goals:  Short Term Goals: 4 weeks   Pt will be independent with home ex program   Pt will increase cervical rotation to 60 degrees bilaterally   Improve lateral tilt to 45 degrees bilaterally   Pt will decrease pain from 6/10 to 4/10   Increase right shoulder range of motion flexion to 160 degrees      Long Term Goals: 6 weeks   Pt will improve pain to 2/10 at rest   Pt will be able  to be able to sit greater than 4 hours without headaches   Increase upper extremity strength to 4/5      Plan   Plan of care Certification: 2/21/2024 to 3/21/2024.     Outpatient Physical Therapy 2 times weekly for 4 weeks to include the following interventions: Electrical Stimulation ifc , Manual Therapy, Moist Heat/ Ice, Neuromuscular Re-ed, Patient Education, Therapeutic Activities, Therapeutic Exercise, and modaltiies as needed  .      Plan of care has been reestablished with Josefina Rosenbaum LPTA, Ángela Baugh LPTA, Sahara Gilbert LPTA  and Siri Melo LPTA.        Yancy Rosenbaum, PTA  3/4/2024

## 2024-03-07 ENCOUNTER — CLINICAL SUPPORT (OUTPATIENT)
Dept: REHABILITATION | Facility: HOSPITAL | Age: 49
End: 2024-03-07
Payer: MEDICARE

## 2024-03-07 DIAGNOSIS — M54.2 NECK PAIN: Primary | ICD-10-CM

## 2024-03-07 DIAGNOSIS — M53.82 DECREASED ROM OF INTERVERTEBRAL DISCS OF CERVICAL SPINE: ICD-10-CM

## 2024-03-07 PROCEDURE — 97112 NEUROMUSCULAR REEDUCATION: CPT | Mod: PN,CQ

## 2024-03-07 PROCEDURE — 97035 APP MDLTY 1+ULTRASOUND EA 15: CPT | Mod: PN,CQ

## 2024-03-07 NOTE — PROGRESS NOTES
Physical Therapy Treatment Note     Name: Sincere AURA Ivory  [facility] Number: [id number]    Therapy Diagnosis:   Encounter Diagnoses   Name Primary?    Neck pain Yes    Decreased ROM of intervertebral discs of cervical spine      Physician: Angie Underwood FNP    Visit Date: 3/7/2024    Physician Orders: PT Eval and Treat    Medical Diagnosis from Referral: neck pain   Evaluation Date: 2/21/2024  Updated Plan of Care Due : 4/21/2024  Authorization Period Expiration: medicare   Plan of Care Expiration: 3/21/2024  Visit # / Visits authorized: 6/ 20  PTA VISIT# 4    Time In: 900  Time Out: 938  Total Billable Time: 38 minutes    Precautions: Standard internal heart monitor left side      Subjective     Pt reports:my muscle tightness is a little better.  She was compliant with home exercise program.  Response to previous treatment: improved less pain   Functional change: increased range of motion     Pain: 3/10  Location: right shoulder and neck         Objective       Sincere participated in neuromuscular re-education activities to improve: Proprioception and Posture for 30 minutes. The following activities were included:    Shoulder pulleys x 5 minutes stretching into end range of motion  Ube x 5 min   Doorway pectoralis stretch with bilateral rotation x 10  Scapular retraction with blue x 10 reps   Scapular retraction thumbs out with blue x 10  Serratus stretch off cybex x 8  Bilateral side lying head lifts x 10  Cervical rotation stretch x 10 reps bilaterally    Prone I's x 10    Range of motion cervical   rotation              right         62        left      65                                                Lateral tilt          right          28       left       32  Shoulder flexion                     right   156             left        155    Sincere received the following direct contact modalities after being cleared for contraindications: Ultrasound:  Sincere received ultrasound to manage pain and  inflammation at 100 % duty cycle applied to the right upper traps  at  1.5 W/cm2/ 3 mhz  for a duration of 8 minutes. Patient tolerated treatment well without adverse effects. Therapist was in attendance throughout intervention.    Home Exercises Provided and Patient Education Provided     Education provided: home ex program     Written Home Exercises Provided: Patient instructed to cont prior HEP.  Exercises were reviewed and Sincere was able to demonstrate them prior to the end of the session.  Sincere demonstrated good  understanding of the education provided.     See EMR under Patient Instructions for exercises provided prior visit.    Assessment   Patient continues to have muscle tightness along right upper traps with some relief follow ultrasound.  Sincere Is progressing well towards her goals.   Pt prognosis is Excellent.     Pt will continue to benefit from skilled outpatient physical therapy to address the deficits listed in the problem list box on initial evaluation, provide pt/family education and to maximize pt's level of independence in the home and community environment.     Pt's spiritual, cultural and educational needs considered and pt agreeable to plan of care and goals.     Anticipated Barriers for therapy: medical diagnosis of neck pain . Pt presents with decreased range of motion and strength , neck pain , scapular weakness causing decreased neck posture and decreased right shoulder range of motion      Goals:  Short Term Goals: 4 weeks   Pt will be independent with home ex program   Pt will increase cervical rotation to 60 degrees bilaterally   Improve lateral tilt to 45 degrees bilaterally   Pt will decrease pain from 6/10 to 4/10   Increase right shoulder range of motion flexion to 160 degrees      Long Term Goals: 6 weeks   Pt will improve pain to 2/10 at rest   Pt will be able to be able to sit greater than 4 hours without headaches   Increase upper extremity strength to 4/5      Plan    Plan of care Certification: 2/21/2024 to 3/21/2024.     Outpatient Physical Therapy 2 times weekly for 4 weeks to include the following interventions: Electrical Stimulation ifc , Manual Therapy, Moist Heat/ Ice, Neuromuscular Re-ed, Patient Education, Therapeutic Activities, Therapeutic Exercise, and modaltiies as needed  .      Plan of care has been reestablished with Josefina JUNIOR, Ángela JUNIOR, Sahara Gilbert LPTA  and Siri Melo LPTA.        Yancy Rosenbaum, PTA  3/7/2024

## 2024-03-11 ENCOUNTER — CLINICAL SUPPORT (OUTPATIENT)
Dept: REHABILITATION | Facility: HOSPITAL | Age: 49
End: 2024-03-11
Payer: MEDICARE

## 2024-03-11 DIAGNOSIS — M53.82 DECREASED ROM OF INTERVERTEBRAL DISCS OF CERVICAL SPINE: Primary | ICD-10-CM

## 2024-03-11 DIAGNOSIS — M54.2 NECK PAIN: ICD-10-CM

## 2024-03-11 PROCEDURE — 97112 NEUROMUSCULAR REEDUCATION: CPT | Mod: PN

## 2024-03-11 PROCEDURE — 97035 APP MDLTY 1+ULTRASOUND EA 15: CPT | Mod: PN

## 2024-03-11 NOTE — PROGRESS NOTES
Physical Therapy Treatment Note     Name: Sinceandriy Ivory  Clinic Number: [id number]    Therapy Diagnosis:   No diagnosis found.    Physician: Karolina Arias MD    Visit Date: 3/11/2024    Physician Orders: PT Eval and Treat    Medical Diagnosis from Referral: neck pain   Evaluation Date: 2/21/2024  Updated Plan of Care Due : 4/21/2024  Authorization Period Expiration: medicare   Plan of Care Expiration: 3/21/2024  Visit # / Visits authorized: 8/ 20  PTA VISIT# 4    Time In: 1220  Time Out: 1310  Total Billable Time: 45 minutes    Precautions: Standard internal heart monitor left side      Subjective     Pt reports:feeling tight today .   She was compliant with home exercise program.  Response to previous treatment: improved less pain   Functional change: increased range of motion     Pain: 4/10  Location: right shoulder and neck         Objective       Sincere participated in neuromuscular re-education activities to improve: Proprioception and Posture for 30 minutes. The following activities were included:    Shoulder pulleys x 5 minutes stretching into end range of motion  Ube x 5 min   Doorway pectoralis stretch with bilateral rotation x 10  Scapular retraction with blue x 10 reps   Scapular retraction thumbs out with blue x 10  Serratus stretch off cybex x 8  Bilateral side lying head lifts x 10  Cervical rotation stretch x 10 reps bilaterally    Prone I's x 10    Range of motion cervical   rotation              right         70        left      65                                                Lateral tilt          right          28       left       32  Shoulder flexion                     right   156             left        155    Sincere received the following direct contact modalities after being cleared for contraindications: Ultrasound:  Sincere received ultrasound to manage pain and inflammation at 100 % duty cycle applied to the right upper traps  at  1.5 W/cm2/ 3 mhz  for a duration of 8  minutes. Patient tolerated treatment well without adverse effects. Therapist was in attendance throughout intervention.    Home Exercises Provided and Patient Education Provided     Education provided: home ex program     Written Home Exercises Provided: Patient instructed to cont prior HEP.  Exercises were reviewed and Sincere was able to demonstrate them prior to the end of the session.  Sincere demonstrated good  understanding of the education provided.     See EMR under Patient Instructions for exercises provided prior visit.    Assessment   Patient continues to have muscle tightness along right upper traps with some relief follow ultrasound.  Sincere Is progressing well towards her goals.   Pt prognosis is Excellent.     Pt will continue to benefit from skilled outpatient physical therapy to address the deficits listed in the problem list box on initial evaluation, provide pt/family education and to maximize pt's level of independence in the home and community environment.     Pt's spiritual, cultural and educational needs considered and pt agreeable to plan of care and goals.     Anticipated Barriers for therapy: medical diagnosis of neck pain . Pt presents with decreased range of motion and strength , neck pain , scapular weakness causing decreased neck posture and decreased right shoulder range of motion      Goals:  Short Term Goals: 4 weeks   Pt will be independent with home ex program               Met   Pt will increase cervical rotation to 60 degrees bilaterally  Met   Improve lateral tilt to 45 degrees bilaterally     Pt will decrease pain from 6/10 to 4/10   4/10   Increase right shoulder range of motion flexion to 160 degrees      Long Term Goals: 6 weeks   Pt will improve pain to 2/10 at rest   Pt will be able to be able to sit greater than 4 hours without headaches   Increase upper extremity strength to 4/5      Plan   Plan of care Certification: 2/21/2024 to 3/21/2024.     Outpatient Physical  Therapy 2 times weekly for 4 weeks to include the following interventions: Electrical Stimulation ifc , Manual Therapy, Moist Heat/ Ice, Neuromuscular Re-ed, Patient Education, Therapeutic Activities, Therapeutic Exercise, and modaltiies as needed  .      Plan of care has been reestablished with Josefina JUNIOR, Ángela Baugh LPTA, Sahara Gilbert LPTA  and Siri JUNIOR.        Kiran Talamantes, PT  3/11/2024

## 2024-03-11 NOTE — PLAN OF CARE
Physical Therapy Treatment Note     Name: Sinceandriy Ivory  Clinic Number: [id number]    Therapy Diagnosis:   No diagnosis found.    Physician: Karolina Arias MD    Visit Date: 3/11/2024    Physician Orders: PT Eval and Treat    Medical Diagnosis from Referral: neck pain   Evaluation Date: 2/21/2024  Updated Plan of Care Due : 4/21/2024  Authorization Period Expiration: medicare   Plan of Care Expiration: 3/21/2024  Visit # / Visits authorized: 8/ 20  PTA VISIT# 4    Time In: 1220  Time Out: 1310  Total Billable Time: 45 minutes    Precautions: Standard internal heart monitor left side      Subjective     Pt reports:feeling tight today .   She was compliant with home exercise program.  Response to previous treatment: improved less pain   Functional change: increased range of motion     Pain: 4/10  Location: right shoulder and neck         Objective       Sincere participated in neuromuscular re-education activities to improve: Proprioception and Posture for 30 minutes. The following activities were included:    Shoulder pulleys x 5 minutes stretching into end range of motion  Ube x 5 min   Doorway pectoralis stretch with bilateral rotation x 10  Scapular retraction with blue x 10 reps   Scapular retraction thumbs out with blue x 10  Serratus stretch off cybex x 8  Bilateral side lying head lifts x 10  Cervical rotation stretch x 10 reps bilaterally    Prone I's x 10    Range of motion cervical   rotation              right         70        left      65                                                Lateral tilt          right          28       left       32  Shoulder flexion                     right   156             left        155    Sincere received the following direct contact modalities after being cleared for contraindications: Ultrasound:  Sincere received ultrasound to manage pain and inflammation at 100 % duty cycle applied to the right upper traps  at  1.5 W/cm2/ 3 mhz  for a duration of 8  minutes. Patient tolerated treatment well without adverse effects. Therapist was in attendance throughout intervention.    Home Exercises Provided and Patient Education Provided     Education provided: home ex program     Written Home Exercises Provided: Patient instructed to cont prior HEP.  Exercises were reviewed and Sincere was able to demonstrate them prior to the end of the session.  Sincere demonstrated good  understanding of the education provided.     See EMR under Patient Instructions for exercises provided prior visit.    Assessment   Patient continues to have muscle tightness along right upper traps with some relief follow ultrasound.  Sincere Is progressing well towards her goals.   Pt prognosis is Excellent.     Pt will continue to benefit from skilled outpatient physical therapy to address the deficits listed in the problem list box on initial evaluation, provide pt/family education and to maximize pt's level of independence in the home and community environment.     Pt's spiritual, cultural and educational needs considered and pt agreeable to plan of care and goals.     Anticipated Barriers for therapy: medical diagnosis of neck pain . Pt presents with decreased range of motion and strength , neck pain , scapular weakness causing decreased neck posture and decreased right shoulder range of motion      Goals:  Short Term Goals: 4 weeks   Pt will be independent with home ex program               Met   Pt will increase cervical rotation to 60 degrees bilaterally  Met   Improve lateral tilt to 45 degrees bilaterally     Pt will decrease pain from 6/10 to 4/10   4/10   Increase right shoulder range of motion flexion to 160 degrees      Long Term Goals: 6 weeks   Pt will improve pain to 2/10 at rest   Pt will be able to be able to sit greater than 4 hours without headaches   Increase upper extremity strength to 4/5      Outpatient Therapy Discharge Summary     Name: Jorge CHAVARRIA [facility] [place]  Number: [id number]    Therapy Diagnosis: No diagnosis found.  Physician: Karolina Arias MD            Assessment    Goals:  Pt met goals     Discharge reason: Patient has met all of his/her goals    Plan   This patient is discharged from Physical Therapy.   Kiran Talamantes, PT

## 2024-04-09 DIAGNOSIS — Z71.89 COMPLEX CARE COORDINATION: ICD-10-CM

## 2024-04-16 LAB
LEFT EYE DM RETINOPATHY: NEGATIVE
RIGHT EYE DM RETINOPATHY: NEGATIVE

## 2024-04-30 ENCOUNTER — OFFICE VISIT (OUTPATIENT)
Dept: FAMILY MEDICINE | Facility: CLINIC | Age: 49
End: 2024-04-30
Payer: MEDICARE

## 2024-04-30 VITALS
RESPIRATION RATE: 18 BRPM | HEIGHT: 58 IN | TEMPERATURE: 98 F | OXYGEN SATURATION: 100 % | DIASTOLIC BLOOD PRESSURE: 81 MMHG | SYSTOLIC BLOOD PRESSURE: 129 MMHG | HEART RATE: 76 BPM | BODY MASS INDEX: 30.39 KG/M2 | WEIGHT: 144.81 LBS

## 2024-04-30 DIAGNOSIS — Q73.8 AUTOSOMAL DOMINANT BRACHYDACTYLY TYPE A1C: ICD-10-CM

## 2024-04-30 DIAGNOSIS — Z12.31 ENCOUNTER FOR SCREENING MAMMOGRAM FOR MALIGNANT NEOPLASM OF BREAST: ICD-10-CM

## 2024-04-30 DIAGNOSIS — B35.1 ONYCHOMYCOSIS: Primary | ICD-10-CM

## 2024-04-30 DIAGNOSIS — E11.9 DIABETES MELLITUS WITHOUT COMPLICATION: ICD-10-CM

## 2024-04-30 LAB
EST. AVERAGE GLUCOSE BLD GHB EST-MCNC: 120 MG/DL
HBA1C MFR BLD HPLC: 5.8 % (ref 4.5–6.6)

## 2024-04-30 PROCEDURE — 83036 HEMOGLOBIN GLYCOSYLATED A1C: CPT | Mod: ,,, | Performed by: CLINICAL MEDICAL LABORATORY

## 2024-04-30 PROCEDURE — 99214 OFFICE O/P EST MOD 30 MIN: CPT | Mod: ,,, | Performed by: NURSE PRACTITIONER

## 2024-04-30 RX ORDER — NEOMYCIN SULFATE, POLYMYXIN B SULFATE AND DEXAMETHASONE 3.5; 10000; 1 MG/ML; [USP'U]/ML; MG/ML
1 SUSPENSION/ DROPS OPHTHALMIC 3 TIMES DAILY
COMMUNITY
Start: 2024-04-16 | End: 2024-04-30 | Stop reason: SINTOL

## 2024-04-30 RX ORDER — DOXYCYCLINE HYCLATE 50 MG/1
50 CAPSULE ORAL EVERY MORNING
COMMUNITY
Start: 2024-04-24

## 2024-04-30 RX ORDER — TERBINAFINE HYDROCHLORIDE 250 MG/1
250 TABLET ORAL DAILY
Qty: 30 TABLET | Refills: 2 | Status: SHIPPED | OUTPATIENT
Start: 2024-04-30 | End: 2024-07-29

## 2024-04-30 NOTE — PATIENT INSTRUCTIONS
will start lamisil for nail fungus. Liver tests reviewed and normal; will repeat ALT in 6 weeks.  Will need to get eye exam from Dr Noe.  Mammogram is due in May and will schedule inJune at [place]  Recommend she exercise thumb, use heat or ice.

## 2024-04-30 NOTE — PROGRESS NOTES
HUGO Pereira   [facility]  [address]   [place] [address]     PATIENT NAME: Jorge Ivory  : 1975  DATE: 24  MRN: [id number]      Billing Provider: HUGO Pereira  Level of Service:   Patient PCP Information       Provider PCP Type    HUGO Pereira General            Reason for Visit / Chief Complaint: needing lab work for A1c and Nail Problem (Left foot 2nd toenail has a dark toenail. Right foot great toe has a dark spot on it also)         History of Present Illness / Problem Focused Workflow     Jorge Ivory is a 48 y.o. female presents to the clinic  for   check up and refills. She has type 2 diabetes which is well controlled with current plan. She is checking her glucose off an on. She has not been able to afford test strips.  She has history of hyperlipidemia and is currently taking  zyptimag and Dr Barrios is  prescribing.  She does not have the myalgia's with this med as she did with other statins.   Pt is concerned about  dark left toenail  that has been present for a good while.      She is on Doxy for one month for a problem with her left eye--tear duct blockage-- that keeps getting infected.  She has not seen an  ophthalmologist.  She is worried she may develop thrush again    She has pain in her right thumb distal joint that can affect her mobility.   She has long history of  arthritis and She  will be going back to rheumatologist in September.      Review of Systems     Review of Systems   Constitutional:  Negative for fatigue.   HENT:  Negative for nasal congestion and sore throat.    Eyes:         Blocked tear duct on left   Respiratory:  Negative for cough, chest tightness and shortness of breath.    Cardiovascular:  Negative for chest pain, palpitations and leg swelling.   Gastrointestinal:  Negative for nausea, vomiting and reflux.   Musculoskeletal:         Left thumb pain     Integumentary:         Toenail discoloration    Neurological:  Negative for weakness and memory loss.   Psychiatric/Behavioral:  Negative for confusion and sleep disturbance.         Medical / Social / Family History     Past Medical History:   Diagnosis Date    Anemia     Diabetes mellitus, type 2     Hypertension     Lupus (systemic lupus erythematosus) 2019    Other long term (current) drug therapy     Rheumatoid arthritis     Statin intolerance 2023    Due to drug induced myalgia    Vitamin D deficiency, unspecified        Past Surgical History:   Procedure Laterality Date    CARPAL TUNNEL RELEASE       SECTION      COLONOSCOPY  2018    MARCO A--Dr. Isaac    HYSTERECTOMY      TUBAL LIGATION  2006       Social History  Ms.  reports that she has never smoked. She has never been exposed to tobacco smoke. She has never used smokeless tobacco. She reports that she does not drink alcohol and does not use drugs.    Family History  Ms.'s family history includes Arthritis in her father and sister; Asthma in her mother; Cancer in her maternal aunt, maternal grandmother, and paternal grandmother; Diabetes in her maternal aunt, maternal grandfather, and mother; Heart disease in her maternal aunt, maternal grandmother, and mother; Heart failure in her mother; Hyperlipidemia in her maternal aunt and maternal grandmother; Hypertension in her maternal aunt, maternal grandmother, and mother; Kidney disease in her maternal aunt; Miscarriages / Stillbirths in her mother; Thyroid disease in her mother; Vision loss in her maternal grandfather, maternal grandmother, maternal uncle, and mother.    Medications and Allergies     Medications  Current Outpatient Medications   Medication Sig Dispense Refill    acetaminophen (TYLENOL) 500 MG tablet Take 500 mg by mouth 2 (two) times daily as needed.      artificial tears ointment (REFRESH P.M.) Oint Place into both eyes every evening.      busPIRone (BUSPAR) 5 MG Tab Take 1 tablet by mouth three times a day  as needed for anxiety      cyclobenzaprine (FLEXERIL) 10 MG tablet Take 1 tablet (10 mg total) by mouth as needed for Muscle spasms. 60 tablet 1    cycloSPORINE (RESTASIS) 0.05 % ophthalmic emulsion Place 1 drop into both eyes 2 (two) times daily.      doxycycline 50 MG capsule Take 50 mg by mouth every morning.      EScitalopram oxalate (LEXAPRO) 10 MG tablet Take 10 mg by mouth once daily.      esomeprazole (NEXIUM) 20 MG capsule Take 20 mg by mouth once daily.      ferrous sulfate (FEOSOL) 325 mg (65 mg iron) Tab tablet Take 325 mg by mouth daily with breakfast.      fluticasone propionate (FLONASE) 50 mcg/actuation nasal spray 1 spray (50 mcg total) by Each Nostril route once daily. 16 g 0    loratadine (CLARITIN) 10 mg tablet Take 1 tablet (10 mg total) by mouth once daily. One tab daily for allergies, sinus drainage as needed 90 tablet 3    meloxicam (MOBIC) 15 MG tablet Take 1 tablet (15 mg total) by mouth daily.      metoprolol succinate (TOPROL-XL) 25 MG 24 hr tablet Take 12.5 mg by mouth once daily.      mirabegron (MYRBETRIQ) 25 mg Tb24 ER tablet Take one tablet at bedtime 30 tablet 11    neomycin-polymyxin-dexamethasone (MAXITROL) 3.5mg/mL-10,000 unit/mL-0.1 % DrpS Place 1 drop into the left eye 3 (three) times daily.      olmesartan (BENICAR) 5 MG Tab Take 5 mg by mouth once daily.      olopatadine (PATADAY ONCE DAILY RELIEF) 0.2 % Drop Place 1 drop into both eyes once daily.      pitavastatin magnesium (ZYPITAMAG) 4 mg Tab Take 4 mg by mouth once daily.      tamsulosin (FLOMAX) 0.4 mg Cap Take 1 capsule (0.4 mg total) by mouth once daily. 90 capsule 1    triamcinolone acetonide 0.1% (KENALOG) 0.1 % cream Apply topically 2 (two) times daily. 45 g 0     No current facility-administered medications for this visit.       Allergies  Review of patient's allergies indicates:   Allergen Reactions    Iodinated contrast media Shortness Of Breath and Rash    Iodine and iodide containing products Anaphylaxis     "Iodine containing multivitamin Anaphylaxis    Cholecalciferol (vitamin d3)     Lisinopril Other (See Comments)    Methotrexate sodium     Shellfish derived     Statins-hmg-coa reductase inhibitors Other (See Comments)     Severe myalgia's with statin      Vicryl [sutures, polyglycolic acid]     Iodine Hives and Rash    Lincomycin Hives and Rash    Penicillins Rash     Other reaction(s): Rash, DifficultyBreathing       Physical Examination     Vitals:    04/30/24 1050 04/30/24 1056   BP: (!) 144/75 129/81   Pulse: 76    Resp: 18    Temp: 98.4 °F (36.9 °C)    TempSrc: Oral    SpO2: 100%    Weight: 65.7 kg (144 lb 12.8 oz)    Height: 4' 10" (1.473 m)       Physical Exam  Constitutional:       Appearance: Normal appearance.   Cardiovascular:      Rate and Rhythm: Normal rate and regular rhythm.      Pulses: Normal pulses.           Dorsalis pedis pulses are 2+ on the right side and 2+ on the left side.        Posterior tibial pulses are 2+ on the right side and 2+ on the left side.      Heart sounds: Normal heart sounds.   Pulmonary:      Effort: Pulmonary effort is normal.      Breath sounds: Normal breath sounds.   Musculoskeletal:      Right lower leg: No edema.      Left lower leg: No edema.   Feet:      Right foot:      Protective Sensation: 10 sites tested.  10 sites sensed.      Skin integrity: Skin integrity normal.      Toenail Condition: Right toenails are normal.      Left foot:      Protective Sensation: 10 sites tested.  7 sites sensed.      Comments: Dark  discoloration of her left 2nd toe, nearly black, and her other toe nails are  dark brown.  No injury to nails.   Skin:     General: Skin is warm and dry.      Comments: Left 2nd toe with darkly discolored toenail and other toenails are  slightly dark.   Neurological:      Mental Status: She is alert and oriented to person, place, and time.   Psychiatric:         Mood and Affect: Mood normal.         Behavior: Behavior normal.          Assessment and Plan " (including Health Maintenance)     :    Plan:  will start lamisil for nail fungus. Liver tests reviewed and normal; will repeat ALT in 6 weeks.  Will need to get eye exam from Dr Noe.  Mammogram is due in May and will schedule inJune at [place]  Recommend she exercise thumb, use heat or ice.        Health Maintenance Due   Topic Date Due    Pneumococcal Vaccines (Age 0-64) (1 of 2 - PCV) Never done    TETANUS VACCINE  05/14/2017    COVID-19 Vaccine (3 - 2023-24 season) 09/01/2023    Eye Exam  03/03/2024    Foot Exam  03/28/2024    Mammogram  05/04/2024       Problem List Items Addressed This Visit    None  .  There are no diagnoses linked to this encounter.   Health Maintenance Topics with due status: Not Due       Topic Last Completion Date    Colorectal Cancer Screening 09/12/2018    Diabetes Urine Screening 08/10/2023    Lipid Panel 12/12/2023    Hemoglobin A1c 12/12/2023       Procedures     Future Appointments   Date Time Provider Department Center   8/14/2024 10:00 AM [facility]   8/27/2024 10:30 AM Kobe Barker NP [facility]        No follow-ups on file.     Signature:  HUGO Pereira    Date of encounter: 4/30/24

## 2024-05-02 ENCOUNTER — PATIENT MESSAGE (OUTPATIENT)
Dept: FAMILY MEDICINE | Facility: CLINIC | Age: 49
End: 2024-05-02
Payer: MEDICARE

## 2024-05-15 ENCOUNTER — PATIENT OUTREACH (OUTPATIENT)
Dept: ADMINISTRATIVE | Facility: HOSPITAL | Age: 49
End: 2024-05-15

## 2024-05-15 NOTE — LETTER
AUTHORIZATION FOR RELEASE OF   CONFIDENTIAL INFORMATION    Dear [facility],    We are seeing Jorge Ivory, date of birth 1975, in the clinic at [facility]. Angie Underwood FNP is the patient's PCP. Jorge Ivory has an outstanding lab/procedure at the time we reviewed her chart. In order to help keep her health information updated, she has authorized us to request the following medical record(s):        (  )  MAMMOGRAM                                      (  )  COLONOSCOPY      (  )  PAP SMEAR                                          (  )  OUTSIDE LAB RESULTS     (  )  DEXA SCAN                                          (X)  EYE EXAM            (  )  FOOT EXAM                                          (  )  ENTIRE RECORD     (  )  OUTSIDE IMMUNIZATIONS                 (  )  _______________         Please fax records to [facility] Coordinator, Nyla Tim, [phone].     If you have any questions, please contact [phone]          Patient Name: Jorge Ivory  : 1975  Patient Phone #: [phone]

## 2024-05-29 ENCOUNTER — PATIENT OUTREACH (OUTPATIENT)
Facility: HOSPITAL | Age: 49
End: 2024-05-29
Payer: MEDICARE

## 2024-06-17 ENCOUNTER — TELEPHONE (OUTPATIENT)
Dept: UROLOGY | Facility: CLINIC | Age: 49
End: 2024-06-17
Payer: MEDICARE

## 2024-06-17 DIAGNOSIS — R30.0 DYSURIA: Primary | ICD-10-CM

## 2024-06-17 NOTE — TELEPHONE ENCOUNTER
I told pt that NP Cherie said she can certainly get the urine culture done at her primary doctor in [place], and we will review and go from there.  I will put the culture order in [facility] and they should be able to pull it up and see it.  She voiced understanding.

## 2024-06-17 NOTE — TELEPHONE ENCOUNTER
I received a message from pt to call back about problems.  Spoke with her.  She reports Thursday she started having pressure and contraction feeling type pain in and over bladder.  Had hysterectomy in 2009.  Has Lupus and not sure if it is Lupus related or bladder spasms or what.  Says she went to [facility] on yesterday, they gave her shot of torodol, and gave her ketorolac 10mg pills, take 1  pill 4 x day PRN pain x 5 days.  Reports  had stopped flomax, but started it back Thursday and still taking it.  Reports nausea, Thursday, Friday and Saturday.  Denies fever, chills, or vomiting.  Reports back pain, but not sure if it is arthritis or urine related.  Says if she needs to have urine culture done, can she have it done in [place] at her PCP who is part of [facility] do it there.  Says [facility] told her her labs were ok, but they did a U/A, not a urine culture.  I told her I will relay this information to MICHELET Crespo and get back in touch with her.  She voiced understanding.

## 2024-06-20 ENCOUNTER — TELEPHONE (OUTPATIENT)
Dept: UROLOGY | Facility: CLINIC | Age: 49
End: 2024-06-20
Payer: MEDICARE

## 2024-06-20 ENCOUNTER — PATIENT MESSAGE (OUTPATIENT)
Dept: UROLOGY | Facility: CLINIC | Age: 49
End: 2024-06-20
Payer: MEDICARE

## 2024-06-20 NOTE — TELEPHONE ENCOUNTER
----- Message from Kobe Barker NP sent at 6/20/2024  1:48 PM CDT -----  Please let patient know her urine culture was negative, thanks   I called pt and relayed the above message to her.  She voiced understanding.

## 2024-06-21 ENCOUNTER — PATIENT OUTREACH (OUTPATIENT)
Facility: HOSPITAL | Age: 49
End: 2024-06-21
Payer: MEDICARE

## 2024-07-22 ENCOUNTER — OFFICE VISIT (OUTPATIENT)
Dept: FAMILY MEDICINE | Facility: CLINIC | Age: 49
End: 2024-07-22
Payer: MEDICARE

## 2024-07-22 VITALS
RESPIRATION RATE: 18 BRPM | DIASTOLIC BLOOD PRESSURE: 76 MMHG | SYSTOLIC BLOOD PRESSURE: 120 MMHG | HEIGHT: 58 IN | BODY MASS INDEX: 30.86 KG/M2 | HEART RATE: 74 BPM | OXYGEN SATURATION: 97 % | WEIGHT: 147 LBS | TEMPERATURE: 98 F

## 2024-07-22 DIAGNOSIS — E55.9 VITAMIN D DEFICIENCY: Primary | ICD-10-CM

## 2024-07-22 DIAGNOSIS — M62.838 MUSCLE SPASM: ICD-10-CM

## 2024-07-22 DIAGNOSIS — R20.2 PARESTHESIA: ICD-10-CM

## 2024-07-22 LAB
25(OH)D3 SERPL-MCNC: 23.6 NG/ML
ALBUMIN SERPL BCP-MCNC: 3.9 G/DL (ref 3.5–5)
ALBUMIN/GLOB SERPL: 1.3 {RATIO}
ALP SERPL-CCNC: 90 U/L (ref 39–100)
ALT SERPL W P-5'-P-CCNC: 69 U/L (ref 13–56)
ANION GAP SERPL CALCULATED.3IONS-SCNC: 10 MMOL/L (ref 7–16)
AST SERPL W P-5'-P-CCNC: 56 U/L (ref 15–37)
BILIRUB SERPL-MCNC: 0.5 MG/DL (ref ?–1.2)
BUN SERPL-MCNC: 11 MG/DL (ref 7–18)
BUN/CREAT SERPL: 14 (ref 6–20)
CALCIUM SERPL-MCNC: 9.2 MG/DL (ref 8.5–10.1)
CHLORIDE SERPL-SCNC: 105 MMOL/L (ref 98–107)
CO2 SERPL-SCNC: 28 MMOL/L (ref 21–32)
CREAT SERPL-MCNC: 0.78 MG/DL (ref 0.55–1.02)
EGFR (NO RACE VARIABLE) (RUSH/TITUS): 93 ML/MIN/1.73M2
GLOBULIN SER-MCNC: 3.1 G/DL (ref 2–4)
GLUCOSE SERPL-MCNC: 102 MG/DL (ref 74–106)
MAGNESIUM SERPL-MCNC: 2.1 MG/DL (ref 1.7–2.3)
POTASSIUM SERPL-SCNC: 4.5 MMOL/L (ref 3.5–5.1)
PROT SERPL-MCNC: 7 G/DL (ref 6.4–8.2)
SODIUM SERPL-SCNC: 138 MMOL/L (ref 136–145)
VIT B12 SERPL-MCNC: 475 PG/ML (ref 193–986)

## 2024-07-22 PROCEDURE — 83735 ASSAY OF MAGNESIUM: CPT | Mod: ,,, | Performed by: CLINICAL MEDICAL LABORATORY

## 2024-07-22 PROCEDURE — 99214 OFFICE O/P EST MOD 30 MIN: CPT | Mod: ,,, | Performed by: NURSE PRACTITIONER

## 2024-07-22 PROCEDURE — 82306 VITAMIN D 25 HYDROXY: CPT | Mod: ,,, | Performed by: CLINICAL MEDICAL LABORATORY

## 2024-07-22 PROCEDURE — 80053 COMPREHEN METABOLIC PANEL: CPT | Mod: ,,, | Performed by: CLINICAL MEDICAL LABORATORY

## 2024-07-22 PROCEDURE — 82607 VITAMIN B-12: CPT | Mod: ,,, | Performed by: CLINICAL MEDICAL LABORATORY

## 2024-07-22 RX ORDER — DICLOFENAC SODIUM 75 MG/1
75 TABLET, DELAYED RELEASE ORAL 2 TIMES DAILY
COMMUNITY
Start: 2024-07-16

## 2024-07-22 RX ORDER — KETOROLAC TROMETHAMINE 10 MG/1
10 TABLET, FILM COATED ORAL 4 TIMES DAILY PRN
COMMUNITY
Start: 2024-06-16 | End: 2024-07-23

## 2024-07-22 RX ORDER — DICLOFENAC SODIUM 10 MG/G
1 GEL TOPICAL 4 TIMES DAILY
COMMUNITY

## 2024-07-22 RX ORDER — MAGNESIUM HYDROXIDE 400 MG/5ML
SUSPENSION, ORAL (FINAL DOSE FORM) ORAL
COMMUNITY

## 2024-07-22 RX ORDER — MELOXICAM 15 MG/1
TABLET ORAL
COMMUNITY
Start: 2024-06-12 | End: 2024-07-23

## 2024-07-22 NOTE — PROGRESS NOTES
"   HUGO Estes   [facility] [address] [place] [address]  [phone]      PATIENT NAME: Jorge Ivory  : 1975  DATE: 24  MRN: [id number]      Billing Provider: HUGO Estes  Level of Service: ID OFFICE/OUTPT VISIT, JOHN FOREMAN III, 30-44 MIN  Patient PCP Information       Provider PCP Type    HUGO Pereira General            Reason for Visit / Chief Complaint: Spasms (States she's been having a lot of muscle spasm 'twitching' since last Wednesday mostly in L hip/R side of face/R arm./Seen Rheum Tuesday, no new meds started./Has caused her muscles to hurt.)       Update PCP  Update Chief Complaint         History of Present Illness / Problem Focused Workflow     48 year old female presents with complaints of muscle spasms  States she has had problems with "twitching" in the past and different problems related to lupus   Recently seen by rheumatology  with no med changes       Review of Systems     Review of Systems   Constitutional:  Negative for fatigue and fever.   HENT:  Negative for congestion.    Respiratory:  Negative for cough and shortness of breath.    Cardiovascular:  Negative for palpitations.   Gastrointestinal:  Negative for abdominal pain, constipation and diarrhea.   Endocrine: Negative for polydipsia and polyuria.   Musculoskeletal:  Positive for arthralgias and myalgias. Negative for gait problem.        Muscle spasms and "twitching"   Neurological:  Negative for dizziness, weakness and headaches.   Psychiatric/Behavioral:  Negative for agitation and dysphoric mood.        Medical / Social / Family History     Past Medical History:   Diagnosis Date    Anemia     Diabetes mellitus, type 2     Hypertension     Lupus (systemic lupus erythematosus) 2019    Other long term (current) drug therapy     Rheumatoid arthritis 2015    Statin intolerance 2023    Due to drug induced myalgia    Vitamin D " deficiency, unspecified        Past Surgical History:   Procedure Laterality Date    CARPAL TUNNEL RELEASE       SECTION  2006    COLONOSCOPY  2018    MARCO A--Dr. Isaac    HYSTERECTOMY      TUBAL LIGATION  2006       Social History  Ms.  reports that she has never smoked. She has never been exposed to tobacco smoke. She has never used smokeless tobacco. She reports that she does not drink alcohol and does not use drugs.    Family History  Ms.'s family history includes Arthritis in her father and sister; Asthma in her mother; Cancer in her maternal aunt, maternal grandmother, and paternal grandmother; Diabetes in her maternal aunt, maternal grandfather, and mother; Heart disease in her maternal aunt, maternal grandmother, and mother; Heart failure in her mother; Hyperlipidemia in her maternal aunt and maternal grandmother; Hypertension in her maternal aunt, maternal grandmother, and mother; Kidney disease in her maternal aunt; Miscarriages / Stillbirths in her mother; Thyroid disease in her mother; Vision loss in her maternal grandfather, maternal grandmother, maternal uncle, and mother.    Medications and Allergies     Medications  Outpatient Medications Marked as Taking for the 24 encounter (Office Visit) with Gema Miller FNP   Medication Sig Dispense Refill    acetaminophen (TYLENOL) 500 MG tablet Take 500 mg by mouth 2 (two) times daily as needed.      artificial tears ointment (REFRESH P.M.) Oint Place into both eyes every evening.      busPIRone (BUSPAR) 5 MG Tab Take 1 tablet by mouth three times a day as needed for anxiety      cycloSPORINE (RESTASIS) 0.05 % ophthalmic emulsion Place 1 drop into both eyes 2 (two) times daily.      diclofenac sodium (VOLTAREN) 1 % Gel Apply 1 application  topically 4 (four) times daily.      EScitalopram oxalate (LEXAPRO) 10 MG tablet Take 10 mg by mouth once daily.      ferrous sulfate (FEOSOL) 325 mg (65 mg iron) Tab tablet Take 325 mg by mouth  daily with breakfast.      fluticasone propionate (FLONASE) 50 mcg/actuation nasal spray 1 spray (50 mcg total) by Each Nostril route once daily. 16 g 0    loratadine (CLARITIN) 10 mg tablet Take 1 tablet (10 mg total) by mouth once daily. One tab daily for allergies, sinus drainage as needed 90 tablet 3    metoprolol succinate (TOPROL-XL) 25 MG 24 hr tablet Take 12.5 mg by mouth once daily.      mirabegron (MYRBETRIQ) 25 mg Tb24 ER tablet Take one tablet at bedtime 30 tablet 11    olmesartan (BENICAR) 5 MG Tab Take 5 mg by mouth once daily.      olopatadine (PATADAY ONCE DAILY RELIEF) 0.2 % Drop Place 1 drop into both eyes once daily.      pitavastatin magnesium (ZYPITAMAG) 4 mg Tab Take 4 mg by mouth once daily.      tamsulosin (FLOMAX) 0.4 mg Cap Take 1 capsule (0.4 mg total) by mouth once daily. 90 capsule 1    triamcinolone acetonide 0.1% (KENALOG) 0.1 % cream Apply topically 2 (two) times daily. 45 g 0    [DISCONTINUED] cyclobenzaprine (FLEXERIL) 10 MG tablet Take 1 tablet (10 mg total) by mouth as needed for Muscle spasms. 60 tablet 1    [DISCONTINUED] meloxicam (MOBIC) 15 MG tablet Take 1 tablet (15 mg total) by mouth daily.      [DISCONTINUED] terbinafine HCL (LAMISIL) 250 mg tablet Take 1 tablet (250 mg total) by mouth once daily. 30 tablet 2       Allergies  Review of patient's allergies indicates:   Allergen Reactions    Iodinated contrast media Shortness Of Breath and Rash    Iodine and iodide containing products Anaphylaxis    Iodine containing multivitamin Anaphylaxis    Cholecalciferol (vitamin d3)     Lisinopril Other (See Comments)    Methotrexate sodium     Shellfish derived     Statins-hmg-coa reductase inhibitors Other (See Comments)     Severe myalgia's with statin      Vicryl [sutures, polyglycolic acid]     Iodine Hives and Rash    Lincomycin Hives and Rash    Penicillins Rash     Other reaction(s): Rash, DifficultyBreathing       Physical Examination     Vitals:    07/22/24 1056   BP: 120/76    Pulse: 74   Resp: 18   Temp: 97.5 °F (36.4 °C)     Physical Exam  Constitutional:       General: She is not in acute distress.  HENT:      Head: Normocephalic.      Nose: Nose normal.      Mouth/Throat:      Mouth: Mucous membranes are moist.   Eyes:      Extraocular Movements: Extraocular movements intact.   Cardiovascular:      Rate and Rhythm: Normal rate.   Pulmonary:      Effort: Pulmonary effort is normal. No respiratory distress.   Abdominal:      General: Bowel sounds are normal.      Palpations: Abdomen is soft.   Musculoskeletal:         General: Normal range of motion.      Cervical back: Normal range of motion.   Skin:     General: Skin is warm.   Neurological:      Mental Status: She is alert.   Psychiatric:         Behavior: Behavior normal.           Imaging / Labs     Office Visit on 07/22/2024   Component Date Value Ref Range Status    Magnesium 07/22/2024 2.1  1.7 - 2.3 mg/dL Final    Vitamin B12 07/22/2024 475  193 - 986 pg/mL Final    Sodium 07/22/2024 138  136 - 145 mmol/L Final    Potassium 07/22/2024 4.5  3.5 - 5.1 mmol/L Final    Chloride 07/22/2024 105  98 - 107 mmol/L Final    CO2 07/22/2024 28  21 - 32 mmol/L Final    Anion Gap 07/22/2024 10  7 - 16 mmol/L Final    Glucose 07/22/2024 102  74 - 106 mg/dL Final    BUN 07/22/2024 11  7 - 18 mg/dL Final    Creatinine 07/22/2024 0.78  0.55 - 1.02 mg/dL Final    BUN/Creatinine Ratio 07/22/2024 14  6 - 20 Final    Calcium 07/22/2024 9.2  8.5 - 10.1 mg/dL Final    Total Protein 07/22/2024 7.0  6.4 - 8.2 g/dL Final    Albumin 07/22/2024 3.9  3.5 - 5.0 g/dL Final    Globulin 07/22/2024 3.1  2.0 - 4.0 g/dL Final    A/G Ratio 07/22/2024 1.3   Final    Bilirubin, Total 07/22/2024 0.5  >0.0 - 1.2 mg/dL Final    Alk Phos 07/22/2024 90  39 - 100 U/L Final    ALT 07/22/2024 69 (H)  13 - 56 U/L Final    AST 07/22/2024 56 (H)  15 - 37 U/L Final    eGFR 07/22/2024 93  >=60 mL/min/1.73m2 Final    Vitamin D 25-Hydroxy, Blood 07/22/2024 23.6  ng/mL Final      Mammo Digital Screening Bilat w/ Heath  Repeat in 1 year      Assessment and Plan (including Health Maintenance)      Problem List  Smart Sets  Document Outside HM   :    Health Maintenance Due   Topic Date Due    Pneumococcal Vaccines (Age 0-64) (1 of 2 - PCV) Never done    TETANUS VACCINE  05/14/2017    COVID-19 Vaccine (3 - 2023-24 season) 09/01/2023    Diabetes Urine Screening  08/10/2024       Problem List Items Addressed This Visit          Endocrine    Vitamin D deficiency - Primary    Current Assessment & Plan     Last vit D around 13  States she is allergic to vit D supplements   Discussed the need to follow up with rheumatology, as some changes may be related to her lupus  Will check labs today and treat as indicated           Relevant Orders    Vitamin D (Completed)     Other Visit Diagnoses       Muscle spasm        Relevant Medications    tiZANidine (ZANAFLEX) 2 MG tablet    Other Relevant Orders    Magnesium (Completed)    Comprehensive Metabolic Panel (Completed)    Paresthesia        Relevant Orders    Vitamin B12 (Completed)    Comprehensive Metabolic Panel (Completed)            Health Maintenance Topics with due status: Not Due       Topic Last Completion Date    Colorectal Cancer Screening 09/12/2018    Influenza Vaccine 12/12/2023    Lipid Panel 12/12/2023    Eye Exam 04/16/2024    Foot Exam 04/30/2024    Hemoglobin A1c 04/30/2024    Mammogram 06/06/2024       Future Appointments   Date Time Provider Department Center   8/21/2024 11:00 AM [facility]   8/27/2024 10:40 AM Kobe Barker NP [facility]          Signature:  HUGO Estes  [facility] [address] [address]  [phone]    Date of encounter: 7/22/24

## 2024-07-22 NOTE — ASSESSMENT & PLAN NOTE
Last vit D around 13  States she is allergic to vit D supplements   Discussed the need to follow up with rheumatology, as some changes may be related to her lupus  Will check labs today and treat as indicated

## 2024-07-22 NOTE — PROGRESS NOTES
Health Maintenance Due   Topic Date Due    Pneumococcal Vaccines (Age 0-64) (1 of 2 - PCV) Never done    TETANUS VACCINE  05/14/2017    COVID-19 Vaccine (3 - 2023-24 season) 09/01/2023    Diabetes Urine Screening  08/10/2024     Discussed care gaps.  Will further discuss w/ her pcp.

## 2024-07-23 ENCOUNTER — TELEPHONE (OUTPATIENT)
Dept: FAMILY MEDICINE | Facility: CLINIC | Age: 49
End: 2024-07-23
Payer: MEDICARE

## 2024-07-23 RX ORDER — TIZANIDINE 2 MG/1
4 TABLET ORAL EVERY 8 HOURS PRN
Qty: 30 TABLET | Refills: 1 | Status: SHIPPED | OUTPATIENT
Start: 2024-07-23 | End: 2024-08-02

## 2024-07-23 NOTE — TELEPHONE ENCOUNTER
----- Message from Laurie Viverosey sent at 7/22/2024  2:30 PM CDT -----  Regarding: sleep apnea  Patient said she forgot got to mention that she had sleep apnea and wants to speak to to a nurse about something regarding that.    Please call patient back [phone]

## 2024-07-23 NOTE — TELEPHONE ENCOUNTER
----- Message from HUGO Estes sent at 7/23/2024  9:58 AM CDT -----  Liver enzymes are elevated; recommend following up with PCP related to that. B12 and Vit D are on lower side of normal, recommend OTC b12 and D vitamins. Also sent in zanaflex; she can try to take half if they make her drowsy. Then follow up with PCP in 2 weeks

## 2024-08-21 ENCOUNTER — OFFICE VISIT (OUTPATIENT)
Dept: FAMILY MEDICINE | Facility: CLINIC | Age: 49
End: 2024-08-21
Payer: MEDICARE

## 2024-08-21 VITALS
HEIGHT: 58 IN | HEART RATE: 65 BPM | RESPIRATION RATE: 16 BRPM | OXYGEN SATURATION: 98 % | SYSTOLIC BLOOD PRESSURE: 124 MMHG | BODY MASS INDEX: 31.45 KG/M2 | TEMPERATURE: 98 F | DIASTOLIC BLOOD PRESSURE: 68 MMHG | WEIGHT: 149.81 LBS

## 2024-08-21 DIAGNOSIS — R25.3 FREQUENT FASCICULATION OF LIMB MUSCLE: ICD-10-CM

## 2024-08-21 DIAGNOSIS — E11.9 DIABETES MELLITUS WITHOUT COMPLICATION: ICD-10-CM

## 2024-08-21 DIAGNOSIS — I10 ESSENTIAL HYPERTENSION, BENIGN: ICD-10-CM

## 2024-08-21 DIAGNOSIS — E66.9 OBESITY (BMI 30.0-34.9): ICD-10-CM

## 2024-08-21 DIAGNOSIS — M06.9 RHEUMATOID ARTHRITIS INVOLVING MULTIPLE SITES, UNSPECIFIED WHETHER RHEUMATOID FACTOR PRESENT: ICD-10-CM

## 2024-08-21 DIAGNOSIS — E78.00 PURE HYPERCHOLESTEROLEMIA: ICD-10-CM

## 2024-08-21 DIAGNOSIS — Z00.00 ENCOUNTER FOR INITIAL ANNUAL WELLNESS VISIT (AWV) IN MEDICARE PATIENT: Primary | ICD-10-CM

## 2024-08-21 DIAGNOSIS — Z78.9 STATIN INTOLERANCE: ICD-10-CM

## 2024-08-21 DIAGNOSIS — M32.14 SYSTEMIC LUPUS ERYTHEMATOSUS WITH GLOMERULAR DISEASE, UNSPECIFIED SLE TYPE: ICD-10-CM

## 2024-08-21 LAB
CREAT UR-MCNC: 55 MG/DL (ref 28–219)
MICROALBUMIN UR-MCNC: <0.5 MG/DL (ref 0–2.8)
MICROALBUMIN/CREAT RATIO PNL UR: <9.1 MG/G (ref 0–30)

## 2024-08-21 PROCEDURE — 82570 ASSAY OF URINE CREATININE: CPT | Mod: ,,, | Performed by: CLINICAL MEDICAL LABORATORY

## 2024-08-21 PROCEDURE — G0438 PPPS, INITIAL VISIT: HCPCS | Mod: ,,, | Performed by: NURSE PRACTITIONER

## 2024-08-21 PROCEDURE — 82043 UR ALBUMIN QUANTITATIVE: CPT | Mod: ,,, | Performed by: CLINICAL MEDICAL LABORATORY

## 2024-08-21 RX ORDER — METHOCARBAMOL 500 MG/1
500 TABLET, FILM COATED ORAL 2 TIMES DAILY PRN
Qty: 20 TABLET | Refills: 0 | Status: SHIPPED | OUTPATIENT
Start: 2024-08-21 | End: 2024-08-31

## 2024-08-21 NOTE — PROGRESS NOTES
"  Sincere Ceasar Ivory presented for a  Medicare AWV and comprehensive Health Risk Assessment today. The following components were reviewed and updated:    Medical history  Family History  Social history  Allergies and Current Medications  Health Risk Assessment  Health Maintenance  Care Team         ** See Completed Assessments for Annual Wellness Visit within the encounter summary.**     HPI:  pt here for AWV exam and refills.  She had an episode of her throat swelling yesterday with no fever/chills and went to [facility] and  was tested negative for strep/covid/flu.  She was given rx for Amoxicillin but has allergy to this and did not take.  Today she feels much better with no swelling.  She has been eating a lot of nuts recently and not sure if  allergic reaction.  Advised to hold all nuts and  then reintroduce slowly and watch for reaction.   She is concerned about   "twitching" of upper arms, legs and eyelids.  She has had spells like this off and on and will sometimes last days and muscle relaxer will help some but will reoccurr.  The muscle relaxer does not affect her eyes but causes her to be drowsy when she uses the flexeril. S he needs less sedating muscle relaxer.  She has sanford given NSAIDs but due to reported stage 3 CKD, she does not want to take NSAID's without consulting with nephrologist.  I will send in Rx for Robaxin which is less sedating.   She had labs at [facility] and will look at in Epic.  She has seen Dr Carey neuro, in distant past and  nothing determined.  She was given Vitamin D  and is tolerating well so far with no significant recurrence of myofascial fasiculations.   She has a  dark toenail left 2nd toe   and unable to take  lamisil  and using foot spray without success.   She has history of lupus and follows with Dr Romo in [place] and recently had  treatment with voltaren.  She follows with him every 6 months and as needed.   Her diabetes is good at present and checks " "her glucose periodically.   She has appt withDr castrejon next month--she is trying  pitavastatin and tolerated fairly well but could not get due to cost--she will check with cardiologist.   Cpap is working fairly well and follows with Dr Xiong-- but causes eye dryness.  She  has an appt with Kobe Logan for OAB.   The following assessments were completed:  Living Situation  CAGE  Depression Screening  Timed Get Up and Go  Whisper Test  Cognitive Function Screening  Nutrition Screening  ADL Screening  PAQ Screening          Opioid Documentation    does not have a current opioid prescription.       Vitals:    08/21/24 1307   BP: 124/68   BP Location: Left arm   Patient Position: Sitting   Pulse: 65   Resp: 16   Temp: 98 °F (36.7 °C)   SpO2: 98%   Weight: 67.9 kg (149 lb 12.8 oz)   Height: 4' 10" (1.473 m)     Body mass index is 31.31 kg/m².  Physical Exam  Constitutional:       Appearance: Normal appearance.   Cardiovascular:      Rate and Rhythm: Normal rate and regular rhythm.      Pulses: Normal pulses.      Heart sounds: Normal heart sounds.   Pulmonary:      Effort: Pulmonary effort is normal.      Breath sounds: Normal breath sounds.   Musculoskeletal:      Right lower leg: No edema.      Left lower leg: No edema.   Lymphadenopathy:      Cervical: No cervical adenopathy.   Neurological:      General: No focal deficit present.      Mental Status: She is alert and oriented to person, place, and time.   Psychiatric:         Mood and Affect: Mood normal.         Behavior: Behavior normal.               Diagnoses and health risks identified today and associated recommendations/orders:    Problem List Items Addressed This Visit          Cardiac/Vascular    Essential hypertension, benign (Chronic)    Hyperlipidemia (Chronic)       Immunology/Multi System    Lupus (systemic lupus erythematosus) (Chronic)    Rheumatoid arthritis (Chronic)       Endocrine    Diabetes mellitus without complication       Other    " Statin intolerance     Other Visit Diagnoses       Encounter for initial annual wellness visit (AWV) in Medicare patient    -  Primary    Obesity (BMI 30.0-34.9)                Provided Sincere with a 5-10 year written screening schedule and personal prevention plan. Recommendations were developed using the USPSTF age appropriate recommendations. Education, counseling, and referrals were provided as needed. After Visit Summary printed and given to patient which includes a list of additional screenings\tests needed.    Patient declines vaccines today , will follow up with pharmacy at  later date if desired.     Microalbumin ordered today     Follow up in 2 months (on 10/21/2024), or a1c., for 1 year annual wellness.    HUGO Pereira      I offered to discuss advanced care planning, including how to pick a person who would make decisions for you if you were unable to make them for yourself, called a health care power of , and what kind of decisions you might make such as use of life sustaining treatments such as ventilators and tube feeding when faced with a life limiting illness recorded on a living will that they will need to know. (How you want to be cared for as you near the end of your natural life)     X Patient is interested in learning more about how to make advanced directives.  I provided them paperwork and offered to discuss this with them.

## 2024-08-21 NOTE — PATIENT INSTRUCTIONS
Counseling and Referral of Other Preventative  (Italic type indicates deductible and co-insurance are waived)    Patient Name: Sincere Ceasar Ivory  Today's Date: 8/21/2024    Health Maintenance       Date Due Completion Date    Pneumococcal Vaccines (Age 0-64) (1 of 2 - PCV) Never done ---    TETANUS VACCINE 05/14/2017 5/14/2007    COVID-19 Vaccine (3 - 2023-24 season) 09/01/2023 8/13/2021    Diabetes Urine Screening 08/10/2024 8/10/2023    Influenza Vaccine (1) 09/01/2024 12/12/2023    Override on 10/11/2022: Declined    Hemoglobin A1c 10/30/2024 4/30/2024    Lipid Panel 12/12/2024 12/12/2023    Eye Exam 04/16/2025 4/16/2024    Foot Exam 04/30/2025 4/30/2024    Mammogram 06/06/2025 6/6/2024    Colorectal Cancer Screening 09/12/2028 9/12/2018        No orders of the defined types were placed in this encounter.    Counseling and Referral of Other Preventative  (Italic type indicates deductible and co-insurance are waived)    Patient Name: Sincere Ceasar Ivory  Today's Date: 8/21/2024    Health Maintenance       Date Due Completion Date    Pneumococcal Vaccines (Age 0-64) (1 of 2 - PCV) Never done ---    TETANUS VACCINE 05/14/2017 5/14/2007    COVID-19 Vaccine (3 - 2023-24 season) 09/01/2023 8/13/2021    Diabetes Urine Screening 08/10/2024 8/10/2023    Influenza Vaccine (1) 09/01/2024 12/12/2023    Override on 10/11/2022: Declined    Hemoglobin A1c 10/30/2024 4/30/2024    Lipid Panel 12/12/2024 12/12/2023    Eye Exam 04/16/2025 4/16/2024    Foot Exam 04/30/2025 4/30/2024    Mammogram 06/06/2025 6/6/2024    Colorectal Cancer Screening 09/12/2028 9/12/2018        No orders of the defined types were placed in this encounter.    The following information is provided to all patients.  This information is to help you find resources for any of the problems found today that may be affecting your health:                  Living healthy guide: [place].gov    Understanding Diabetes: www.diabetes.org      Eating healthy:  www.cdc.gov/healthyweight      CDC home safety checklist: www.cdc.gov/steadi/patient.html      Agency on Aging: [place].gov    Alcoholics anonymous (AA): www.aa.org      Physical Activity: www.leslie.nih.gov/ry9htpu      Tobacco use: [place].gov

## 2024-08-26 NOTE — PROGRESS NOTES
Subjective     Patient ID: Jorge Ivory is a 48 y.o. female.    Chief Complaint: No chief complaint on file.    This pleasant 48 year old female presents to the clinic as a new patient referral from Dr. MAXIME Arias for urinary retention. Patient states symptoms started in 2018 and was previously evaluated by Dr. Dowd at [facility]. She reports having a cystoscopy at that time and started on Oxybutynin 10 mg. She reports this medication was replaced by flomax 0.4 mg daily. She is tolerating the medication without side effects. She reports mixed incontinence with urge and stress incontinence. We discussed doing the Kegel exercises and she was given samples of Myrbetriq 50 mg one daily and will take the Flomax 0.4 mg one at night. She reports having suspected glaucoma and is routinely being evaluated for the glaucoma. She also has Lupus. She reports frequency, urgency, and nocturia 4 to 5 times a night. She reports incomplete bladder emptying and feels like she still needs to urinate after voiding. We discussed double voiding. Her PVR was 0 mls at today's visit. She denies dysuria or hematuria. She denies any abdominal, bladder or back pain. She reports having a UTI in August 2023 that was treated with antibiotics but is unsure which one. There was not a urine culture in the [employer] system. She reports having a partial hysterectomy and bladder tac by OB/GYN Dr. Ricardo Dennis in 2009. She denies smoking or drinking alcohol. I will culture her urine and treat if indicated. She again will continue the flomax 0.4 mg one at night and try the Myrbetriq 50 mg one daily in addition to the kegel exercises. I discussed the plan with Urologist Dr. PORTIA Levy and the patient and they are in agreement with the plan. All her questions were answered at today's visit. I spent 30 minutes counseling this patient.  ---------------  [November 27, 2023].             This pleasant 48 year old female presents to the clinic for  follow up of mixed urinary incontinence and incomplete bladder emptying.  Patient states she is doing good.  She denies any new urological complaints. She reports urinary incontinence is greatly improved and controlled with the Myrbetriq 25 mg.  She is tolerating this medication without side effects. She is pleased with the treatment of her incontinence and bladder emptying.  She desires to continue the current management of both. She is on Flomax and we discussed this.  She desires to come off the Flomax and will stop this medication.  If her symptoms get worse then we will restart the Flomax.  She does report nocturia 1 to 3 times a night,  some urge incontinence that is improved per the patient and incomplete bladder emptying.  Her PVR is 11 mls.  She denies dysuria, hematuria, or frequency.  She denies fever, chills, nausea or vomiting. She denies any bladder or abdominal pain but does report chronic back pain and is doing physical therapy for the back pain. Her urine culture in November 2023 was negative. She reports having a partial hysterectomy and bladder tac by OB/GYN Dr. Ricardo Dennis in 2009. She denies smoking or drinking alcohol.  I discussed the plan in detail with the patient and she is in agreement with the plan.  All her questions were answered at today's visit.  -----------------------------------------------------------------------------------------  [February 27, 2024].           This pleasant 48 year old female presents to the clinic for follow up of OAB,  mixed urinary incontinence and bladder spasms.  Patient states she is doing good.  She denies any new urological complaints. She reports urinary incontinence is greatly improved and controlled with the Myrbetriq 25 mg.  She is tolerating this medication without side effects. She is pleased with the treatment of her incontinence, OAB and bladder spasms. .  She desires to continue the current management.  She does report nocturia 0 to 3 times a  night and some urge incontinence.  She was encouraged to do the Kegel exercises.  She denies dysuria, hematuria, incomplete bladder emptying, urgency, or frequency.  She denies fever, chills, nausea or vomiting. She denies any bladder or abdominal pain but does report chronic back pain and is doing physical therapy for the back pain. Her urine culture in November 2023 was negative. She reports having a partial hysterectomy and bladder tac by OB/GYN Dr. Ricardo Dennis in 2009. She denies smoking or drinking alcohol. I will renew the Myrbetriq 25 mg as outlined in the plan.  I will see her back in the clinic in 6 months or sooner if needed.  I discussed the plan in detail with the patient and she is in agreement with the plan.  All her questions were answered at today's visit.   -----------------------------------------------------------------------------------------------  [August 27, 2024].         Review of Systems   Constitutional:  Negative for activity change and fever.   HENT:  Negative for hearing loss and trouble swallowing.    Eyes:  Negative for visual disturbance.   Respiratory:  Negative for cough, shortness of breath and wheezing.    Cardiovascular:  Negative for chest pain.   Gastrointestinal:  Negative for abdominal pain, diarrhea, nausea and vomiting.   Endocrine: Negative for polyuria.   Genitourinary:  Positive for bladder incontinence. Negative for decreased urine volume, difficulty urinating, dysuria, enuresis, flank pain, frequency, hematuria, nocturia and urgency.        OAB        Bladder Spasms    Musculoskeletal:  Negative for back pain and gait problem.   Integumentary:  Negative for rash.   Neurological:  Negative for speech difficulty and weakness.   Psychiatric/Behavioral:  Negative for behavioral problems and confusion.           Objective     Physical Exam  Vitals and nursing note reviewed.   Constitutional:       General: She is not in acute distress.     Appearance: Normal appearance.  She is not ill-appearing, toxic-appearing or diaphoretic.   HENT:      Head: Normocephalic.   Eyes:      Extraocular Movements: Extraocular movements intact.   Cardiovascular:      Rate and Rhythm: Normal rate and regular rhythm.      Heart sounds: Normal heart sounds.   Pulmonary:      Effort: Pulmonary effort is normal.      Breath sounds: Normal breath sounds. No wheezing, rhonchi or rales.   Abdominal:      General: Bowel sounds are normal.      Palpations: Abdomen is soft.      Tenderness: There is no abdominal tenderness. There is no right CVA tenderness, left CVA tenderness, guarding or rebound.   Musculoskeletal:         General: Normal range of motion.      Cervical back: Normal range of motion. No rigidity.   Skin:     General: Skin is warm and dry.   Neurological:      General: No focal deficit present.      Mental Status: She is alert and oriented to person, place, and time.      Motor: No weakness.      Coordination: Coordination normal.      Gait: Gait normal.   Psychiatric:         Mood and Affect: Mood normal.         Behavior: Behavior normal.         Thought Content: Thought content normal.          Assessment and Plan     Problem List Items Addressed This Visit          Renal/    Mixed incontinence urge and stress    Relevant Medications    mirabegron (MYRBETRIQ) 25 mg Tb24 ER tablet    OAB (overactive bladder) - Primary    Relevant Medications    mirabegron (MYRBETRIQ) 25 mg Tb24 ER tablet     Other Visit Diagnoses       Bladder spasm        Relevant Medications    mirabegron (MYRBETRIQ) 25 mg Tb24 ER tablet                 Renew and Continue Myrbetriq 25 mg one at bedtime   Follow up with Urology in 6 months or sooner if needed

## 2024-08-27 ENCOUNTER — OFFICE VISIT (OUTPATIENT)
Dept: UROLOGY | Facility: CLINIC | Age: 49
End: 2024-08-27
Payer: MEDICARE

## 2024-08-27 ENCOUNTER — TELEPHONE (OUTPATIENT)
Dept: FAMILY MEDICINE | Facility: CLINIC | Age: 49
End: 2024-08-27
Payer: MEDICARE

## 2024-08-27 VITALS
TEMPERATURE: 98 F | HEIGHT: 58 IN | BODY MASS INDEX: 31.49 KG/M2 | OXYGEN SATURATION: 98 % | DIASTOLIC BLOOD PRESSURE: 76 MMHG | HEART RATE: 75 BPM | WEIGHT: 150 LBS | SYSTOLIC BLOOD PRESSURE: 121 MMHG

## 2024-08-27 DIAGNOSIS — N32.89 BLADDER SPASM: ICD-10-CM

## 2024-08-27 DIAGNOSIS — N39.46 MIXED INCONTINENCE URGE AND STRESS: ICD-10-CM

## 2024-08-27 DIAGNOSIS — N32.81 OAB (OVERACTIVE BLADDER): Primary | ICD-10-CM

## 2024-08-27 PROCEDURE — 99999 PR PBB SHADOW E&M-EST. PATIENT-LVL V: CPT | Mod: PBBFAC,,, | Performed by: NURSE PRACTITIONER

## 2024-08-27 PROCEDURE — 99213 OFFICE O/P EST LOW 20 MIN: CPT | Mod: S$PBB,,, | Performed by: NURSE PRACTITIONER

## 2024-08-27 PROCEDURE — 99215 OFFICE O/P EST HI 40 MIN: CPT | Mod: PBBFAC | Performed by: NURSE PRACTITIONER

## 2024-08-27 RX ORDER — CEFDINIR 300 MG/1
CAPSULE ORAL
COMMUNITY
Start: 2024-08-21

## 2024-08-27 RX ORDER — MIRABEGRON 25 MG/1
TABLET, FILM COATED, EXTENDED RELEASE ORAL
Qty: 30 TABLET | Refills: 11 | Status: SHIPPED | OUTPATIENT
Start: 2024-08-27

## 2024-08-27 NOTE — TELEPHONE ENCOUNTER
"Pt's Insurance has denied Methocarbomal 500mg tabs and needing an appeal. Pt has Stage 1 CKD per our labs and CKD III per urologist. Faxing information to pt's insurance company for appeal. [phone]. Jovana with appeal program says it will take up to 72 hours for the process. Case #[id number] has been marked "URGENT'.   "

## 2024-08-27 NOTE — PATIENT INSTRUCTIONS
Renew and Continue Myrbetriq 25 mg one at bedtime   Follow up with Urology in 6 months or sooner if needed

## 2024-08-28 ENCOUNTER — TELEPHONE (OUTPATIENT)
Dept: FAMILY MEDICINE | Facility: CLINIC | Age: 49
End: 2024-08-28
Payer: MEDICARE

## 2024-08-28 NOTE — TELEPHONE ENCOUNTER
Pt notified by voice message that the Appealed Medicaiton, Methocarbamol 500 mg has been approved and to check with her pharmacy.

## 2024-10-21 ENCOUNTER — OFFICE VISIT (OUTPATIENT)
Dept: FAMILY MEDICINE | Facility: CLINIC | Age: 49
End: 2024-10-21
Payer: MEDICARE

## 2024-10-21 ENCOUNTER — PATIENT MESSAGE (OUTPATIENT)
Dept: ADMINISTRATIVE | Facility: HOSPITAL | Age: 49
End: 2024-10-21

## 2024-10-21 VITALS
RESPIRATION RATE: 20 BRPM | WEIGHT: 155 LBS | DIASTOLIC BLOOD PRESSURE: 81 MMHG | TEMPERATURE: 98 F | HEART RATE: 83 BPM | SYSTOLIC BLOOD PRESSURE: 124 MMHG | BODY MASS INDEX: 32.54 KG/M2 | HEIGHT: 58 IN | OXYGEN SATURATION: 97 %

## 2024-10-21 DIAGNOSIS — M32.9 SYSTEMIC LUPUS ERYTHEMATOSUS, UNSPECIFIED SLE TYPE, UNSPECIFIED ORGAN INVOLVEMENT STATUS: Chronic | ICD-10-CM

## 2024-10-21 DIAGNOSIS — M47.812 OSTEOARTHRITIS OF CERVICAL SPINE, UNSPECIFIED SPINAL OSTEOARTHRITIS COMPLICATION STATUS: Chronic | ICD-10-CM

## 2024-10-21 DIAGNOSIS — Z78.9 STATIN INTOLERANCE: Chronic | ICD-10-CM

## 2024-10-21 DIAGNOSIS — M06.041 RHEUMATOID ARTHRITIS INVOLVING BOTH HANDS WITH NEGATIVE RHEUMATOID FACTOR: Chronic | ICD-10-CM

## 2024-10-21 DIAGNOSIS — R41.3 MEMORY DISORDER: ICD-10-CM

## 2024-10-21 DIAGNOSIS — R51.9 INCREASED FREQUENCY OF HEADACHES: ICD-10-CM

## 2024-10-21 DIAGNOSIS — M06.042 RHEUMATOID ARTHRITIS INVOLVING BOTH HANDS WITH NEGATIVE RHEUMATOID FACTOR: Chronic | ICD-10-CM

## 2024-10-21 DIAGNOSIS — E11.9 DIABETES MELLITUS WITHOUT COMPLICATION: Primary | Chronic | ICD-10-CM

## 2024-10-21 DIAGNOSIS — H53.9 CHANGES IN VISION: ICD-10-CM

## 2024-10-21 PROCEDURE — 83036 HEMOGLOBIN GLYCOSYLATED A1C: CPT | Mod: ,,, | Performed by: CLINICAL MEDICAL LABORATORY

## 2024-10-21 PROCEDURE — 99214 OFFICE O/P EST MOD 30 MIN: CPT | Mod: ,,, | Performed by: NURSE PRACTITIONER

## 2024-10-21 RX ORDER — TOBRAMYCIN AND DEXAMETHASONE 3; 1 MG/ML; MG/ML
1 SUSPENSION/ DROPS OPHTHALMIC 4 TIMES DAILY
COMMUNITY
Start: 2024-08-16

## 2024-10-21 RX ORDER — TIZANIDINE 2 MG/1
4 TABLET ORAL EVERY 8 HOURS PRN
COMMUNITY
Start: 2024-08-02

## 2024-10-21 RX ORDER — CHOLECALCIFEROL (VITAMIN D3) 25 MCG
1000 TABLET ORAL DAILY
COMMUNITY
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
## 2024-10-22 LAB
EST. AVERAGE GLUCOSE BLD GHB EST-MCNC: 123 MG/DL
HBA1C MFR BLD HPLC: 5.9 % (ref 4.5–6.6)

## 2024-10-28 LAB
LEFT EYE DM RETINOPATHY: NEGATIVE
RIGHT EYE DM RETINOPATHY: NEGATIVE

## 2025-01-22 ENCOUNTER — PATIENT MESSAGE (OUTPATIENT)
Dept: FAMILY MEDICINE | Facility: CLINIC | Age: 50
End: 2025-01-22
Payer: MEDICARE

## 2025-01-22 ENCOUNTER — PATIENT MESSAGE (OUTPATIENT)
Dept: ADMINISTRATIVE | Facility: HOSPITAL | Age: 50
End: 2025-01-22

## 2025-01-29 ENCOUNTER — PATIENT OUTREACH (OUTPATIENT)
Facility: HOSPITAL | Age: 50
End: 2025-01-29
Payer: MEDICARE

## 2025-01-29 DIAGNOSIS — M32.9 SYSTEMIC LUPUS ERYTHEMATOSUS, UNSPECIFIED SLE TYPE, UNSPECIFIED ORGAN INVOLVEMENT STATUS: Primary | ICD-10-CM

## 2025-01-29 NOTE — PROGRESS NOTES
Population Health Chart Review & Patient Outreach Details    Updates Requested / Reviewed:  [x]  Care Team Updated      Health Maintenance Topics Addressed and Outreach Outcomes / Actions Taken:  Diabetic Eye Exam  Edson sent to [place] [facility]

## 2025-01-29 NOTE — LETTER
AUTHORIZATION FOR RELEASE OF   CONFIDENTIAL INFORMATION    Dear Dr. Gamble,    We are seeing Jorge Ivory, date of birth 1975, in the clinic at [facility]. Angie Underwood FNP is the patient's PCP. Jorge Ivory has an outstanding lab/procedure at the time we reviewed her chart. In order to help keep her health information updated, she has authorized us to request the following medical record(s): She has an appointment 2025 could you fax those updated records as well? thanks       (  )  MAMMOGRAM                                      (  )  COLONOSCOPY      (  )  PAP SMEAR                                          (  )  OUTSIDE LAB RESULTS     (  )  DEXA SCAN                                          ( x )  EYE EXAM            (  )  FOOT EXAM                                          (  )  ENTIRE RECORD     (  )  OUTSIDE IMMUNIZATIONS                 (  )  _______________         Please fax records to Sania Vazquez LPN Care Coordinator at [phone].       If you have any questions, please contact [phone]          Patient Name: Jorge Ivory  : 1975  Patient Phone #: [phone]         Jorge Ivory  MRN: [id number]  : 1975  Age: 48 y.o.  Sex: female         Patient/Legal Guardian Signature  This signature was collected at 2024    Sincere     Self  _______________________________   Printed Name/Relationship to Patient      Consent for Examination and Treatment: I hereby authorize the providers and employees of [facility] ([facility]) to provide medical treatment/services which includes, but is not limited to, performing and administering tests and diagnostic procedures that are deemed necessary, including, but not limited to, imaging examinations, blood tests and other laboratory procedures as may be required by the hospital, clinic, or may be ordered by my physician(s) or persons working under the  general and/or special instructions of my physician(s).      I understand and agree that this consent covers all authorized persons, including but not limited to physicians, residents, nurse practitioners, physicians' assistants, specialists, consultants, student nurses, and independently contracted physicians, who are called upon by the physician in charge, to carry out the diagnostic procedures and medical or surgical treatment.     I hereby authorize [facility] to retain or dispose of any specimens or tissue, should there be such remaining from any test or procedure.     I hereby authorize and give consent for [facility] providers and employees to take photographs, images or videotapes of such diagnostic, surgical or treatment procedures of Patient as may be required by [facility] or as may be ordered by a physician. I further acknowledge and agree that [facility] may use cameras or other devices for patient monitoring.     I am aware that the practice of medicine is not an exact science, and I acknowledge that no guarantees have been made to me as to the outcome of any tests, procedures or treatment.     Authorization for Release of Information: I understand that my insurance company and/or their agents may need information necessary to make determinations about payment/reimbursement. I hereby provide authorization to release to all insurance companies, their successors, assignees, other parties with whom they may have contracted, or others acting on their behalf, that are involved with payment for any hospital and/or clinic charges incurred by the patient, any information that they request and deem necessary for payment/reimbursement, and/or quality review.  I further authorize the release of my health information to physicians or other health care practitioners on staff who are involved in my health care now and in the future, and to other health care providers, entities, or institutions for the purpose of my continued  care and treatment, including referrals.     REGISTRATION AUTHORIZATION  Form No. 63350 (Rev. 7/13/2022)       Medicare Patient's Certification and Authorization to Release Information and Payment Request:  I certify that the information given by me in applying for payment under Title XVIII of the Social Security Act is correct. I authorize any mcduffie of medical or other information about me to release to the Social [facility], or its intermediaries or carriers, any information needed for this or a related Medicare claim. I request that payment of authorized benefits be made on my behalf.     Assignment of Insurance Benefits:   I hereby authorize any and all insurance companies, health plans, defined   benefit plans, health insurers or any entity that is or may be responsible for payment of my medical expenses to pay all hospital and medical benefits now due, and to become due and payable to me under any hospital benefits, sick benefits, injury benefits or any other benefit for services rendered to me, including Major Medical Benefits, direct to [facility] and all independently contracted physicians. I assign any and all rights that I may have against any and all insurance companies, health plans, defined benefit plans, health insurers or any entity that is or may be responsible for payment of my medical expenses, including, but not limited to any right to appeal a denial of a claim, any right to bring any action, lawsuit, administrative proceeding, or other cause of action on my behalf. I specifically assign my right to pursue litigation against any and all insurance companies, health plans, defined benefit plans, health insurers or any entity that is or may be responsible for payment of my medical expenses based upon a refusal to pay charges.            E. Valuables: It is understood and agreed that [facility] is not liable for the damage to or loss of any money, jewelry,   documents, dentures, eye glasses,  hearing aids, prosthetics, or other property of value.     F. Computer Equipment: I understand and agree that should I choose to use computer equipment owned by [facility] or if I choose to access the Internet via [facility]s network, I do so at my own risk. [facility] is not responsible for any damage to my computer equipment or to any damages of any type that might arise from my loss of equipment or data.     G. Acceptance of Financial Responsibility:  I agree that in consideration of the services and   supplies that have been   or will be furnished to the patient, I am hereby obligated to pay all charges made for or on the account of the patient according to the standard rates (in effect at the time the services and supplies are delivered) established by [facility], including its Patient Financial Assistance Policy to the extent it is applicable. I understand that I am responsible for all charges, or portions thereof, not covered by insurance or other sources. Patient refunds will be distributed only after balances at all [facility] facilities are paid.     H. Communication Authorization:  I hereby authorize [facility] and its representatives, along with any billing service   or  who may work on their behalf, to contact me on   my cell phone and/or home phone using pre- recorded messages, artificial voice messages, automatic telephone dialing devices or other computer assisted technology, or by electronic      mail, text messaging, or by any other form of electronic communication. This includes, but is not limited to, appointment reminders, yearly physical exam reminders, preventive care reminders, patient campaigns, welcome calls, and calls about account balances on my account or any account on which I am listed as a guarantor. I understand I have the right to opt out of these communications at any time.      Relationship  Between  Facility and  Provider:      I understand that some, but not all, providers  furnishing services to the patient are not employees or agents of [facility]. The patient is under the care and supervision of his/her attending physician, and it is the responsibility of the facility and its nursing staff to carry out the instructions of such physicians. It is the responsibility of the patient's physician/designee to obtain the patient's informed consent, when required, for medical or surgical treatment, special diagnostic or therapeutic procedures, or hospital services rendered for the patient under the special instructions of the physician/designee.     REGISTRATION AUTHORIZATION  Form No. 29313 (Rev. 7/13/2022)      Notice of Privacy Practices: I acknowledge I have received a copy of [facility]'s Notice of Privacy Practices.     Facility  Directory: I have discussed with the organization my desire to be either included or excluded  in the facility directory in the event of my being an inpatient at an [facility] facility. I understand that if my choice is to opt-out of being identified in the facility directory that the facility will not provide any information about me such as my condition (e.g. fair, stable, etc.) or my location in the facility (e.g., room number, department).     Immunizations: [facility] shares immunization information with state sponsored health departments to help you and your doctor keep track of your immunization records. By signing, you consent to have this information shared with the health department in your state:                                [place] - LINKS ([place] Immunization Network for Kids Statewide)                                [place] - MIIX ([place] Immunization Information eXchange)                                [place] - ImmPRINT (Immunization Patient Registry with Integrated Technology)     TERM: This authorization is valid for this and subsequent care/treatment I receive at [facility] and will remain valid unless/until revoked in writing by  me.     [facility]: As used in this document, [facility] means all [facility] owned and managed facilities, including, but not limited to, all health centers, surgery centers, clinics, urgent care centers, and hospitals.         [facility] complies with applicable Federal civil rights laws and does not discriminate on the basis of race, color, national origin, age, disability, or sex.  ATENCIÓN: si habla español, tiene a child disposición servicios gratuitos de asistencia lingüística. Rickey al [phone].  CHÚ Ý: N?u b?n nói Ti?ng Vi?t, có các d?ch v? h? tr? ngôn ng? mi?n phí dành cho b?n. G?i s? [phone].        REGISTRATION AUTHORIZATION  Form No. 27392 (Rev. 7/13/2022)

## 2025-02-25 ENCOUNTER — OFFICE VISIT (OUTPATIENT)
Dept: FAMILY MEDICINE | Facility: CLINIC | Age: 50
End: 2025-02-25
Payer: MEDICARE

## 2025-02-25 VITALS
RESPIRATION RATE: 18 BRPM | TEMPERATURE: 98 F | BODY MASS INDEX: 31.57 KG/M2 | HEART RATE: 86 BPM | WEIGHT: 150.38 LBS | DIASTOLIC BLOOD PRESSURE: 74 MMHG | HEIGHT: 58 IN | SYSTOLIC BLOOD PRESSURE: 118 MMHG | OXYGEN SATURATION: 98 %

## 2025-02-25 DIAGNOSIS — B00.1 FEVER BLISTER: ICD-10-CM

## 2025-02-25 DIAGNOSIS — H66.002 NON-RECURRENT ACUTE SUPPURATIVE OTITIS MEDIA OF LEFT EAR WITHOUT SPONTANEOUS RUPTURE OF TYMPANIC MEMBRANE: Primary | ICD-10-CM

## 2025-02-25 DIAGNOSIS — E04.9 ENLARGED THYROID: ICD-10-CM

## 2025-02-25 DIAGNOSIS — J02.9 SORE THROAT: ICD-10-CM

## 2025-02-25 LAB
BASOPHILS # BLD AUTO: 0.04 K/UL (ref 0–0.2)
BASOPHILS NFR BLD AUTO: 0.8 % (ref 0–1)
CTP QC/QA: YES
DIFFERENTIAL METHOD BLD: ABNORMAL
EOSINOPHIL # BLD AUTO: 0.05 K/UL (ref 0–0.5)
EOSINOPHIL NFR BLD AUTO: 1 % (ref 1–4)
ERYTHROCYTE [DISTWIDTH] IN BLOOD BY AUTOMATED COUNT: 12.9 % (ref 11.5–14.5)
HCT VFR BLD AUTO: 45.5 % (ref 38–47)
HGB BLD-MCNC: 13.8 G/DL (ref 12–16)
IMM GRANULOCYTES # BLD AUTO: 0.02 K/UL (ref 0–0.04)
IMM GRANULOCYTES NFR BLD: 0.4 % (ref 0–0.4)
LYMPHOCYTES # BLD AUTO: 2.12 K/UL (ref 1–4.8)
LYMPHOCYTES NFR BLD AUTO: 40.9 % (ref 27–41)
MCH RBC QN AUTO: 27.4 PG (ref 27–31)
MCHC RBC AUTO-ENTMCNC: 30.3 G/DL (ref 32–36)
MCV RBC AUTO: 90.3 FL (ref 80–96)
MOLECULAR STREP A: NEGATIVE
MONOCYTES # BLD AUTO: 0.39 K/UL (ref 0–0.8)
MONOCYTES NFR BLD AUTO: 7.5 % (ref 2–6)
MPC BLD CALC-MCNC: 11.3 FL (ref 9.4–12.4)
NEUTROPHILS # BLD AUTO: 2.56 K/UL (ref 1.8–7.7)
NEUTROPHILS NFR BLD AUTO: 49.4 % (ref 53–65)
NRBC # BLD AUTO: 0 X10E3/UL
NRBC, AUTO (.00): 0 %
PLATELET # BLD AUTO: 312 K/UL (ref 150–400)
RBC # BLD AUTO: 5.04 M/UL (ref 4.2–5.4)
TSH SERPL DL<=0.005 MIU/L-ACNC: 0.91 UIU/ML (ref 0.35–4.94)
WBC # BLD AUTO: 5.18 K/UL (ref 4.5–11)

## 2025-02-25 PROCEDURE — 87651 STREP A DNA AMP PROBE: CPT | Mod: RHCUB | Performed by: STUDENT IN AN ORGANIZED HEALTH CARE EDUCATION/TRAINING PROGRAM

## 2025-02-25 PROCEDURE — 99213 OFFICE O/P EST LOW 20 MIN: CPT | Mod: ,,, | Performed by: STUDENT IN AN ORGANIZED HEALTH CARE EDUCATION/TRAINING PROGRAM

## 2025-02-25 PROCEDURE — 85025 COMPLETE CBC W/AUTO DIFF WBC: CPT | Mod: ,,, | Performed by: CLINICAL MEDICAL LABORATORY

## 2025-02-25 PROCEDURE — 84443 ASSAY THYROID STIM HORMONE: CPT | Mod: ,,, | Performed by: CLINICAL MEDICAL LABORATORY

## 2025-02-25 RX ORDER — DOXYCYCLINE 100 MG/1
100 CAPSULE ORAL 2 TIMES DAILY
Qty: 20 CAPSULE | Refills: 0 | Status: SHIPPED | OUTPATIENT
Start: 2025-02-25 | End: 2025-03-07

## 2025-02-25 RX ORDER — PITAVASTATIN CALCIUM 4.18 MG/1
1 TABLET, FILM COATED ORAL DAILY
COMMUNITY
Start: 2024-11-24

## 2025-02-25 RX ORDER — FLUTICASONE PROPIONATE AND SALMETEROL 50; 500 UG/1; UG/1
1 POWDER RESPIRATORY (INHALATION) DAILY PRN
COMMUNITY

## 2025-02-25 RX ORDER — DOCOSANOL 100 MG/G
CREAM TOPICAL
Qty: 2 G | Refills: 0 | Status: SHIPPED | OUTPATIENT
Start: 2025-02-25

## 2025-02-25 RX ORDER — METHOCARBAMOL 500 MG/1
500 TABLET, FILM COATED ORAL 2 TIMES DAILY PRN
COMMUNITY
Start: 2024-08-21

## 2025-02-25 NOTE — PROGRESS NOTES
Progress Note     LEE VAZQUEZ MD   [address]  [place] [address]     PATIENT NAME: Jorge Ivory  : 1975  DATE: 25  MRN: [id number]      Billing Provider: LEE VAZQUEZ MD  Level of Service:   Patient PCP Information       Provider PCP Type    Angie Yasmine, FNP General                Oral Pain (Pt presents to the clinic complaining of mouth sore /Mouth is tender to touch on left side /Tongue is raw feeling towards the back of mouth /Denies any new medications or foods consumed /Symptoms started  more painful yesterday ) and Otalgia (C/O left ear pain radiating to jaw on left side )      SUBJECTIVE:     Jorge Ivory is a 49 y.o.female who presents to clinic for Oral Pain (Pt presents to the clinic complaining of mouth sore /Mouth is tender to touch on left side /Tongue is raw feeling towards the back of mouth /Denies any new medications or foods consumed /Symptoms started  more painful yesterday ) and Otalgia (C/O left ear pain radiating to jaw on left side )    Patient presents to clinic today with left throat pain, left ear pain, and fever blister.  Patient with onset of symptoms last week.  Patient notes her tongue and throat or uncomfortable.  She has been afebrile.  She denies any known sick contacts.  He has used Advair as needed and has a history of thrush but states this is not necessarily feel like her typical thrush.  She has been on p.o. steroids recently.  She does not smoke or dip.  She has no history of thyroid disease.  She notes she has had some associated congestion recently.    Addendum: Contacted patient on .  Patient notes significant improvement in symptoms since her evaluation and initiation of abxs.  Patient was counseled to contact the clinic on Monday if symptoms had not continue to improve/resolve.  Can order ultrasound at that time.    All other pertinent review of systems  negative. Please see HPI for details.     Past Medical History:  has a past medical history of Anemia, Diabetes mellitus, type 2, Hypertension, Lupus (systemic lupus erythematosus) (2019), Other long term (current) drug therapy, Rheumatoid arthritis (), Statin intolerance (2023), and Vitamin D deficiency, unspecified.   Past Surgical History:  has a past surgical history that includes Carpal tunnel release;  section (); Colonoscopy (2018); Hysterectomy; and Tubal ligation (2006).  Family History: family history includes Arthritis in her father and sister; Asthma in her mother; Cancer in her maternal aunt, maternal grandmother, and paternal grandmother; Diabetes in her maternal aunt, maternal grandfather, and mother; Heart disease in her maternal aunt, maternal grandmother, and mother; Heart failure in her mother; Hyperlipidemia in her maternal aunt and maternal grandmother; Hypertension in her maternal aunt, maternal grandmother, and mother; Kidney disease in her maternal aunt; Miscarriages / Stillbirths in her mother; Thyroid disease in her mother; Vision loss in her maternal grandfather, maternal grandmother, maternal uncle, and mother.  Social History:  reports that she has never smoked. She has never been exposed to tobacco smoke. She has never used smokeless tobacco. She reports that she does not drink alcohol and does not use drugs.  Allergies:   Review of patient's allergies indicates:   Allergen Reactions    Iodinated contrast media Shortness Of Breath and Rash    Iodine and iodide containing products Anaphylaxis    Iodine containing multivitamin Anaphylaxis    Maxitrol [neomycin-polymyxin b-dexameth] Swelling     Made eye swell    Cholecalciferol (vitamin d3)     Lisinopril Other (See Comments)    Methotrexate sodium     Shellfish derived     Statins-hmg-coa reductase inhibitors Other (See Comments)     Severe myalgia's with statin      Vicryl [sutures, polyglycolic acid]   "   Iodine Hives and Rash    Lincomycin Hives and Rash    Penicillins Rash     Other reaction(s): Rash, DifficultyBreathing       Current Medications[1]   OBJECTIVE:     Vital Signs   /74   Pulse 86   Temp 98.2 °F (36.8 °C)   Resp 18   Ht 4' 10" (1.473 m)   Wt 68.2 kg (150 lb 6.4 oz)   SpO2 98%   BMI 31.43 kg/m²     Physical Exam  Constitutional:       General: She is not in acute distress.     Appearance: Normal appearance. She is not ill-appearing, toxic-appearing or diaphoretic.   HENT:      Head: Normocephalic and atraumatic.      Comments: Possibly mildly enlarged thyroid but no nodules appreciated.     Right Ear: Tympanic membrane normal.      Ears:      Comments: Left TM with cloudy fluid and mild bulging.     Nose: Congestion present. No rhinorrhea.      Mouth/Throat:      Mouth: Mucous membranes are moist.      Pharynx: Posterior oropharyngeal erythema present. No oropharyngeal exudate.      Comments: Mild erythema no enlarged tonsils or asymmetry noted no significantly enlarged lymph nodes.  Eyes:      Extraocular Movements: Extraocular movements intact.      Pupils: Pupils are equal, round, and reactive to light.   Cardiovascular:      Rate and Rhythm: Normal rate and regular rhythm.      Pulses: Normal pulses.      Heart sounds: Normal heart sounds. No murmur heard.     No friction rub. No gallop.   Pulmonary:      Effort: Pulmonary effort is normal. No respiratory distress.      Breath sounds: No wheezing, rhonchi or rales.   Abdominal:      General: Abdomen is flat.      Palpations: Abdomen is soft.      Tenderness: There is no abdominal tenderness. There is no guarding or rebound.   Musculoskeletal:         General: Normal range of motion.      Cervical back: Normal range of motion.   Skin:     General: Skin is warm and dry.      Capillary Refill: Capillary refill takes less than 2 seconds.      Comments: fever blister present   Neurological:      General: No focal deficit present.      " Mental Status: She is alert.   Psychiatric:         Mood and Affect: Mood normal.         Behavior: Behavior normal.         ASSESSMENT/PLAN:     1. Non-recurrent acute suppurative otitis media of left ear without spontaneous rupture of tympanic membrane  -     doxycycline (VIBRAMYCIN) 100 MG Cap; Take 1 capsule (100 mg total) by mouth 2 (two) times daily. for 10 days  Dispense: 20 capsule; Refill: 0    2. Sore throat  -     TSH; Future; Expected date: 02/25/2025  -     CBC Auto Differential; Future; Expected date: 02/25/2025  -     POCT Strep A, Molecular  Patient with sore throat.  Strep negative.  Exam consistent with acute otitis media.  Treating with doxycycline given patient's various allergies.  Also obtaining CBC.  Throat pain is likely secondary to referred pain due to ear infection.  No lymphadenitis appreciated.    3. Enlarged thyroid  -     TSH; Future; Expected date: 02/25/2025  Patient possibly with mildly enlarged thyroid on exam.  No nodules appreciated.  Given symptoms, we will obtain TSH.  If normal, recommend re-examination at follow up appointment or if symptoms are not improving.  Can consider thyroid ultrasound at that time if indicated.      4. Fever blister  -     docosanoL (ABREVA) 10 % Crea; Apply to fever blister on lip.  Dispense: 2 g; Refill: 0  Patient with fever blister.  Providing Abreva as patient declines p.o. antiviral therapy.      Follow up if symptoms worsen or fail to improve.      LEE VAZQUEZ MD  02/27/2025    Due to voice recognition software, sound alike and misspelled words may be contained in the documentation.              [1]   Current Outpatient Medications:     ADVAIR DISKUS 500-50 mcg/dose DsDv diskus inhaler, Inhale 1 puff into the lungs daily as needed., Disp: , Rfl:     artificial tears ointment (REFRESH P.M.) Oint, Place into both eyes every evening., Disp: , Rfl:     busPIRone (BUSPAR) 5 MG Tab, Take 1 tablet by mouth three times a day as needed for  anxiety, Disp: , Rfl:     cyanocobalamin, vitamin B-12, 5,000 mcg TbDL, Take 1 tablet by mouth once daily., Disp: , Rfl:     cycloSPORINE (RESTASIS) 0.05 % ophthalmic emulsion, Place 1 drop into both eyes 2 (two) times daily., Disp: , Rfl:     diclofenac (VOLTAREN) 75 MG EC tablet, Take 75 mg by mouth 2 (two) times daily., Disp: , Rfl:     diclofenac sodium (VOLTAREN) 1 % Gel, Apply 1 application  topically 4 (four) times daily., Disp: , Rfl:     EScitalopram oxalate (LEXAPRO) 10 MG tablet, Take 10 mg by mouth once daily., Disp: , Rfl:     ferrous sulfate (FEOSOL) 325 mg (65 mg iron) Tab tablet, Take 325 mg by mouth daily with breakfast., Disp: , Rfl:     fluticasone propionate (FLONASE) 50 mcg/actuation nasal spray, 1 spray (50 mcg total) by Each Nostril route once daily., Disp: 16 g, Rfl: 0    loratadine (CLARITIN) 10 mg tablet, Take 1 tablet (10 mg total) by mouth once daily. One tab daily for allergies, sinus drainage as needed, Disp: 90 tablet, Rfl: 3    methocarbamoL (ROBAXIN) 500 MG Tab, Take 500 mg by mouth 2 (two) times daily as needed., Disp: , Rfl:     metoprolol succinate (TOPROL-XL) 25 MG 24 hr tablet, Take 12.5 mg by mouth once daily., Disp: , Rfl:     mirabegron (MYRBETRIQ) 25 mg Tb24 ER tablet, Take one tablet at bedtime, Disp: 30 tablet, Rfl: 11    olmesartan (BENICAR) 5 MG Tab, Take 5 mg by mouth once daily., Disp: , Rfl:     olopatadine (PATADAY ONCE DAILY RELIEF) 0.2 % Drop, Place 1 drop into both eyes once daily., Disp: , Rfl:     pitavastatin calcium (LIVALO) 4 mg Tab, Take 1 tablet by mouth once daily., Disp: , Rfl:     tobramycin-dexAMETHasone 0.3-0.1% (TOBRADEX) 0.3-0.1 % DrpS, Place 1 drop into the left eye 4 (four) times daily., Disp: , Rfl:     triamcinolone acetonide 0.1% (KENALOG) 0.1 % cream, Apply topically 2 (two) times daily., Disp: 45 g, Rfl: 0    vitamin D (VITAMIN D3) 1000 units Tab, Take 1,000 Units by mouth once daily., Disp: , Rfl:     acetaminophen (TYLENOL) 500 MG tablet,  Take 500 mg by mouth 2 (two) times daily as needed., Disp: , Rfl:     docosanoL (ABREVA) 10 % Crea, Apply to fever blister on lip., Disp: 2 g, Rfl: 0    doxycycline (VIBRAMYCIN) 100 MG Cap, Take 1 capsule (100 mg total) by mouth 2 (two) times daily. for 10 days, Disp: 20 capsule, Rfl: 0

## 2025-02-26 ENCOUNTER — RESULTS FOLLOW-UP (OUTPATIENT)
Dept: FAMILY MEDICINE | Facility: CLINIC | Age: 50
End: 2025-02-26

## 2025-02-26 NOTE — PROGRESS NOTES
Please let patient know that her blood count is normal.  Her thyroid level is also normal.  Please ensure that her symptoms are improving.  If symptoms are not improving, please let me know and I will go ahead and order an ultrasound of her neck.

## 2025-02-28 NOTE — PROGRESS NOTES
Subjective     Patient ID: Sincere AURA Ivory is a 49 y.o. female.    Chief Complaint:  Follow-up of overactive bladder    This pleasant 49-year-old female presents to the clinic for follow-up of overactive bladder and urinary incontinence.  Patient states she is doing good today.  She denies any new urological complaints.  She reports overactive bladder and urinary incontinence have greatly improved since being on the Myrbetriq 25 mg 1 daily.  She is tolerating this medicine without side effects.  She does report having glaucoma and is not a candidate for the traditional overactive bladder medication such as oxybutynin, Detrol LA, VESIcare, and Toviaz as these medications are at contraindication to glaucoma.  She denies signs or symptoms of a urinary tract infection.  She does report nocturia 0-2 times a night and occasional frequency.  She denies dysuria, hematuria, urgency, or incomplete bladder emptying.  She denies fever, chills, nausea, or vomiting.  She denies any  pains.  She has been through physical therapy for chronic back pain.  She reports having a partial hysterectomy and bladder tack by OBGYN/gyn Dr. Silva in 2009.  She denies smoking or drinking alcohol.  Through shared decision-making with the patient, she desires to continue the Myrbetriq 25 mg 1 tablet daily.  She was encouraged to continue the Kegel exercises and stay hydrated.  I discussed the plan in detail with the patient and she is in agreement with the plan.  All her questions were answered at today's visit.  I spent 30 minutes counseling this patient, reviewing the chart, imaging and labs.  ------------------------------------------------------------------------------  [March 6, 2025].       Review of Systems   Constitutional:  Negative for activity change and fever.   HENT:  Negative for hearing loss and trouble swallowing.    Eyes:  Negative for visual disturbance.   Respiratory:  Negative for cough, shortness of breath and  wheezing.    Cardiovascular:  Negative for chest pain.   Gastrointestinal:  Negative for abdominal pain, diarrhea, nausea and vomiting.   Endocrine: Negative for polyuria.   Genitourinary:  Positive for bladder incontinence and nocturia. Negative for decreased urine volume, difficulty urinating, dysuria, enuresis, flank pain, frequency, hematuria and urgency.        Overactive bladder   Musculoskeletal:  Negative for back pain and gait problem.   Integumentary:  Negative for rash.   Neurological:  Negative for speech difficulty and weakness.   Psychiatric/Behavioral:  Negative for behavioral problems and confusion.           Objective     Physical Exam  Vitals and nursing note reviewed.   Constitutional:       General: She is not in acute distress.     Appearance: Normal appearance. She is not ill-appearing, toxic-appearing or diaphoretic.   HENT:      Head: Normocephalic.   Eyes:      Extraocular Movements: Extraocular movements intact.   Cardiovascular:      Rate and Rhythm: Normal rate and regular rhythm.      Heart sounds: Normal heart sounds.   Pulmonary:      Effort: Pulmonary effort is normal.      Breath sounds: Normal breath sounds. No wheezing, rhonchi or rales.   Abdominal:      General: Bowel sounds are normal.      Palpations: Abdomen is soft.      Tenderness: There is no abdominal tenderness. There is no right CVA tenderness, left CVA tenderness, guarding or rebound.   Musculoskeletal:         General: Normal range of motion.      Cervical back: Normal range of motion. No rigidity.   Skin:     General: Skin is warm and dry.   Neurological:      General: No focal deficit present.      Mental Status: She is alert and oriented to person, place, and time.      Motor: No weakness.      Coordination: Coordination normal.      Gait: Gait normal.   Psychiatric:         Mood and Affect: Mood normal.         Behavior: Behavior normal.         Thought Content: Thought content normal.          Assessment and Plan      1. OAB (overactive bladder)    2. Mixed incontinence urge and stress             1. Continue mirabegron (Myrbetriq) 25 mg 1 tablet daily  2. Follow-up with urology NP WERO Crespo in 6 months

## 2025-03-04 ENCOUNTER — OFFICE VISIT (OUTPATIENT)
Dept: FAMILY MEDICINE | Facility: CLINIC | Age: 50
End: 2025-03-04
Payer: MEDICARE

## 2025-03-04 VITALS
OXYGEN SATURATION: 98 % | BODY MASS INDEX: 32.07 KG/M2 | SYSTOLIC BLOOD PRESSURE: 118 MMHG | TEMPERATURE: 98 F | RESPIRATION RATE: 18 BRPM | DIASTOLIC BLOOD PRESSURE: 80 MMHG | HEART RATE: 106 BPM | WEIGHT: 152.81 LBS | HEIGHT: 58 IN

## 2025-03-04 DIAGNOSIS — J02.9 SORE THROAT: ICD-10-CM

## 2025-03-04 DIAGNOSIS — E04.9 ENLARGED THYROID: Primary | ICD-10-CM

## 2025-03-04 PROCEDURE — 99213 OFFICE O/P EST LOW 20 MIN: CPT | Mod: ,,, | Performed by: STUDENT IN AN ORGANIZED HEALTH CARE EDUCATION/TRAINING PROGRAM

## 2025-03-04 RX ORDER — NYSTATIN 100000 [USP'U]/ML
4 SUSPENSION ORAL 4 TIMES DAILY
Qty: 160 ML | Refills: 0 | Status: SHIPPED | OUTPATIENT
Start: 2025-03-04 | End: 2025-03-14

## 2025-03-04 NOTE — PROGRESS NOTES
Progress Note     LEE VAZQUEZ MD   [address]  [place] [address]     PATIENT NAME: Jorge Ivory  : 1975  DATE: 3/4/25  MRN: [id number]      Billing Provider: LEE VAZQUEZ MD  Level of Service:   Patient PCP Information       Provider PCP Type    HUGO Pereira General                Otalgia (Pt presents to clinic for follow up regarding issues she was previoulsy seen for. She states you instructed her to come back Monday if her symptoms persisted. She states her symptoms improved a little but not much. She states it feels like something is going on in her throat and she is concerned about thrush. )      SUBJECTIVE:     Jorge Ivory is a 49 y.o.female who presents to clinic for Otalgia (Pt presents to clinic for follow up regarding issues she was previoulsy seen for. She states you instructed her to come back Monday if her symptoms persisted. She states her symptoms improved a little but not much. She states it feels like something is going on in her throat and she is concerned about thrush. )    Patient presents to clinic today with neck discomfort.  Patient had prescription for doxycycline which she has almost completed.  She notes her ear pain is much improved.  She continued to have some throat discomfort on the left however.  Notes she went to the dentist and had a tooth pulled.  She states pain mildly improved but still present in uncomfortable.  She notes it is midline and on the left of her neck.  She feels like the very back of her tongue is also burning.  She does has a history of esophageal thrush.  Her dentist did not mention any signs of thrush.    All other pertinent review of systems negative. Please see HPI for details.     Past Medical History:  has a past medical history of Anemia, Diabetes mellitus, type 2, Hypertension, Lupus (systemic lupus erythematosus) (2019), Other long term (current) drug  therapy, Rheumatoid arthritis (), Statin intolerance (2023), and Vitamin D deficiency, unspecified.   Past Surgical History:  has a past surgical history that includes Carpal tunnel release;  section (); Colonoscopy (2018); Hysterectomy; and Tubal ligation (2006).  Family History: family history includes Arthritis in her father and sister; Asthma in her mother; Cancer in her maternal aunt, maternal grandmother, and paternal grandmother; Diabetes in her maternal aunt, maternal grandfather, and mother; Heart disease in her maternal aunt, maternal grandmother, and mother; Heart failure in her mother; Hyperlipidemia in her maternal aunt and maternal grandmother; Hypertension in her maternal aunt, maternal grandmother, and mother; Kidney disease in her maternal aunt; Miscarriages / Stillbirths in her mother; Thyroid disease in her mother; Vision loss in her maternal grandfather, maternal grandmother, maternal uncle, and mother.  Social History:  reports that she has never smoked. She has never been exposed to tobacco smoke. She has never used smokeless tobacco. She reports that she does not drink alcohol and does not use drugs.  Allergies:   Review of patient's allergies indicates:   Allergen Reactions    Iodinated contrast media Shortness Of Breath and Rash    Iodine and iodide containing products Anaphylaxis    Iodine containing multivitamin Anaphylaxis    Maxitrol [neomycin-polymyxin b-dexameth] Swelling     Made eye swell    Cholecalciferol (vitamin d3)     Lisinopril Other (See Comments)    Methotrexate sodium     Shellfish derived     Statins-hmg-coa reductase inhibitors Other (See Comments)     Severe myalgia's with statin      Vicryl [sutures, polyglycolic acid]     Iodine Hives and Rash    Lincomycin Hives and Rash    Penicillins Rash     Other reaction(s): Rash, DifficultyBreathing       Current Medications[1]   OBJECTIVE:     Vital Signs   /80 (BP Location: Left arm,  "Patient Position: Sitting)   Pulse 106   Temp 98 °F (36.7 °C) (Temporal)   Resp 18   Ht 4' 10" (1.473 m)   Wt 69.3 kg (152 lb 12.8 oz)   SpO2 98%   BMI 31.94 kg/m²     Physical Exam  Constitutional:       General: She is not in acute distress.     Appearance: Normal appearance. She is not ill-appearing, toxic-appearing or diaphoretic.   HENT:      Head: Normocephalic and atraumatic.      Right Ear: Tympanic membrane normal.      Left Ear: Tympanic membrane normal.      Nose: No congestion or rhinorrhea.      Mouth/Throat:      Mouth: Mucous membranes are moist.      Pharynx: No oropharyngeal exudate or posterior oropharyngeal erythema.   Eyes:      Extraocular Movements: Extraocular movements intact.      Pupils: Pupils are equal, round, and reactive to light.   Neck:      Comments: Mildly enlarged thyroid.  Cardiovascular:      Rate and Rhythm: Normal rate and regular rhythm.      Pulses: Normal pulses.      Heart sounds: Normal heart sounds. No murmur heard.     No friction rub. No gallop.   Pulmonary:      Effort: Pulmonary effort is normal. No respiratory distress.      Breath sounds: No wheezing, rhonchi or rales.   Abdominal:      General: Abdomen is flat.      Palpations: Abdomen is soft.      Tenderness: There is no abdominal tenderness. There is no guarding or rebound.   Musculoskeletal:         General: Normal range of motion.      Cervical back: Normal range of motion. No tenderness.   Skin:     General: Skin is warm and dry.      Capillary Refill: Capillary refill takes less than 2 seconds.   Neurological:      General: No focal deficit present.      Mental Status: She is alert.   Psychiatric:         Mood and Affect: Mood normal.         Behavior: Behavior normal.         ASSESSMENT/PLAN:     1. Enlarged thyroid  -     US Soft Tissue Head Neck; Future; Expected date: 03/04/2025    2. Sore throat  -     nystatin (MYCOSTATIN) 100,000 unit/mL suspension; Take 4 mLs (400,000 Units total) by mouth 4 " (four) times daily. for 10 days  Dispense: 160 mL; Refill: 0    Throat exam reassuring with no obvious signs of thrush.  However, given patient's history and symptoms we will go ahead and treat with nystatin out of an abundance of caution.  Patient also with mildly enlarged thyroid.  Recent TSH was normal.  Given persistent discomfort, we will order head neck ultrasound for additional evaluation.  Pending results patient may benefit from additional evaluation.  If symptoms persist, can refer to ENT.    Follow up if symptoms worsen or fail to improve.      LEE VAZQUEZ MD  03/04/2025    Due to voice recognition software, sound alike and misspelled words may be contained in the documentation.              [1]   Current Outpatient Medications:     acetaminophen (TYLENOL) 500 MG tablet, Take 500 mg by mouth 2 (two) times daily as needed., Disp: , Rfl:     ADVAIR DISKUS 500-50 mcg/dose DsDv diskus inhaler, Inhale 1 puff into the lungs daily as needed., Disp: , Rfl:     artificial tears ointment (REFRESH P.M.) Oint, Place into both eyes every evening., Disp: , Rfl:     busPIRone (BUSPAR) 5 MG Tab, Take 1 tablet by mouth three times a day as needed for anxiety, Disp: , Rfl:     cyanocobalamin, vitamin B-12, 5,000 mcg TbDL, Take 1 tablet by mouth once daily., Disp: , Rfl:     cycloSPORINE (RESTASIS) 0.05 % ophthalmic emulsion, Place 1 drop into both eyes 2 (two) times daily., Disp: , Rfl:     diclofenac (VOLTAREN) 75 MG EC tablet, Take 75 mg by mouth 2 (two) times daily., Disp: , Rfl:     diclofenac sodium (VOLTAREN) 1 % Gel, Apply 1 application  topically 4 (four) times daily., Disp: , Rfl:     doxycycline (VIBRAMYCIN) 100 MG Cap, Take 1 capsule (100 mg total) by mouth 2 (two) times daily. for 10 days, Disp: 20 capsule, Rfl: 0    EScitalopram oxalate (LEXAPRO) 10 MG tablet, Take 10 mg by mouth once daily., Disp: , Rfl:     ferrous sulfate (FEOSOL) 325 mg (65 mg iron) Tab tablet, Take 325 mg by mouth daily with  breakfast., Disp: , Rfl:     loratadine (CLARITIN) 10 mg tablet, Take 1 tablet (10 mg total) by mouth once daily. One tab daily for allergies, sinus drainage as needed, Disp: 90 tablet, Rfl: 3    methocarbamoL (ROBAXIN) 500 MG Tab, Take 500 mg by mouth 2 (two) times daily as needed., Disp: , Rfl:     metoprolol succinate (TOPROL-XL) 25 MG 24 hr tablet, Take 12.5 mg by mouth once daily., Disp: , Rfl:     mirabegron (MYRBETRIQ) 25 mg Tb24 ER tablet, Take one tablet at bedtime, Disp: 30 tablet, Rfl: 11    olmesartan (BENICAR) 5 MG Tab, Take 5 mg by mouth once daily., Disp: , Rfl:     olopatadine (PATADAY ONCE DAILY RELIEF) 0.2 % Drop, Place 1 drop into both eyes once daily., Disp: , Rfl:     pitavastatin calcium (LIVALO) 4 mg Tab, Take 1 tablet by mouth once daily., Disp: , Rfl:     triamcinolone acetonide 0.1% (KENALOG) 0.1 % cream, Apply topically 2 (two) times daily., Disp: 45 g, Rfl: 0    docosanoL (ABREVA) 10 % Crea, Apply to fever blister on lip. (Patient not taking: Reported on 3/4/2025), Disp: 2 g, Rfl: 0    fluticasone propionate (FLONASE) 50 mcg/actuation nasal spray, 1 spray (50 mcg total) by Each Nostril route once daily. (Patient not taking: Reported on 3/4/2025), Disp: 16 g, Rfl: 0    nystatin (MYCOSTATIN) 100,000 unit/mL suspension, Take 4 mLs (400,000 Units total) by mouth 4 (four) times daily. for 10 days, Disp: 160 mL, Rfl: 0    tobramycin-dexAMETHasone 0.3-0.1% (TOBRADEX) 0.3-0.1 % DrpS, Place 1 drop into the left eye 4 (four) times daily. (Patient not taking: Reported on 3/4/2025), Disp: , Rfl:     vitamin D (VITAMIN D3) 1000 units Tab, Take 1,000 Units by mouth once daily. (Patient not taking: Reported on 3/4/2025), Disp: , Rfl:

## 2025-03-05 ENCOUNTER — HOSPITAL ENCOUNTER (OUTPATIENT)
Dept: RADIOLOGY | Facility: HOSPITAL | Age: 50
Discharge: HOME | End: 2025-03-05
Attending: STUDENT IN AN ORGANIZED HEALTH CARE EDUCATION/TRAINING PROGRAM
Payer: MEDICARE

## 2025-03-05 DIAGNOSIS — E04.9 ENLARGED THYROID: ICD-10-CM

## 2025-03-05 PROCEDURE — 76536 US EXAM OF HEAD AND NECK: CPT | Mod: TC

## 2025-03-06 ENCOUNTER — OFFICE VISIT (OUTPATIENT)
Dept: UROLOGY | Facility: CLINIC | Age: 50
End: 2025-03-06
Payer: MEDICARE

## 2025-03-06 VITALS
HEART RATE: 86 BPM | BODY MASS INDEX: 31.77 KG/M2 | OXYGEN SATURATION: 98 % | SYSTOLIC BLOOD PRESSURE: 122 MMHG | DIASTOLIC BLOOD PRESSURE: 76 MMHG | WEIGHT: 152 LBS

## 2025-03-06 DIAGNOSIS — N32.81 OAB (OVERACTIVE BLADDER): Primary | Chronic | ICD-10-CM

## 2025-03-06 DIAGNOSIS — N39.46 MIXED INCONTINENCE URGE AND STRESS: ICD-10-CM

## 2025-03-06 PROBLEM — E11.21 DIABETIC NEPHROPATHY ASSOCIATED WITH TYPE 2 DIABETES MELLITUS: Chronic | Status: ACTIVE | Noted: 2025-02-13

## 2025-03-06 PROCEDURE — 99215 OFFICE O/P EST HI 40 MIN: CPT | Mod: PBBFAC | Performed by: NURSE PRACTITIONER

## 2025-03-06 PROCEDURE — 99214 OFFICE O/P EST MOD 30 MIN: CPT | Mod: S$PBB,,, | Performed by: NURSE PRACTITIONER

## 2025-03-06 PROCEDURE — 99999 PR PBB SHADOW E&M-EST. PATIENT-LVL V: CPT | Mod: PBBFAC,,, | Performed by: NURSE PRACTITIONER

## 2025-03-06 NOTE — PATIENT INSTRUCTIONS
1. Continue mirabegron (Myrbetriq) 25 mg 1 tablet daily  2. Follow-up with urology MICHELET Crespo in 6 months

## 2025-03-11 ENCOUNTER — TELEPHONE (OUTPATIENT)
Dept: FAMILY MEDICINE | Facility: CLINIC | Age: 50
End: 2025-03-11
Payer: MEDICARE

## 2025-03-11 ENCOUNTER — RESULTS FOLLOW-UP (OUTPATIENT)
Dept: FAMILY MEDICINE | Facility: CLINIC | Age: 50
End: 2025-03-11

## 2025-03-11 DIAGNOSIS — R07.0 THROAT PAIN: Primary | ICD-10-CM

## 2025-03-11 NOTE — TELEPHONE ENCOUNTER
Pt contacted clinic regarding a ENT referral. She states she had her ultrasound done last Wednesday. States the pain has subsided some but is still having some issues. Please advise.

## 2025-03-11 NOTE — PROGRESS NOTES
Please let patient know that she does have a small nodule in the left thyroid.  No follow up is recommended at this time.  If she is continuing to have discomfort, please let me know and I can refer her to ENT for additional evaluation.

## 2025-03-11 NOTE — TELEPHONE ENCOUNTER
Pt notified of referral placed for ENT. Stated to contact us if she does not have a call to have appt scheduled in two weeks from today. Verbalized understanding.

## 2025-03-20 ENCOUNTER — OFFICE VISIT (OUTPATIENT)
Dept: OTOLARYNGOLOGY | Facility: CLINIC | Age: 50
End: 2025-03-20
Payer: MEDICARE

## 2025-03-20 VITALS — HEIGHT: 58 IN | WEIGHT: 152 LBS | BODY MASS INDEX: 31.91 KG/M2

## 2025-03-20 DIAGNOSIS — E04.1 THYROID NODULE: Primary | ICD-10-CM

## 2025-03-20 DIAGNOSIS — R07.0 THROAT PAIN: ICD-10-CM

## 2025-03-20 PROCEDURE — 99214 OFFICE O/P EST MOD 30 MIN: CPT | Mod: S$PBB,,, | Performed by: OTOLARYNGOLOGY

## 2025-03-20 PROCEDURE — 99999 PR PBB SHADOW E&M-EST. PATIENT-LVL V: CPT | Mod: PBBFAC,,, | Performed by: OTOLARYNGOLOGY

## 2025-03-20 PROCEDURE — 99215 OFFICE O/P EST HI 40 MIN: CPT | Mod: PBBFAC | Performed by: OTOLARYNGOLOGY

## 2025-03-20 NOTE — PROGRESS NOTES
Subjective:       Patient ID: Sincere AURA Ivory is a 49 y.o. female.    Chief Complaint: Sore Throat (Center of throat pain. Pt states since 2/24/25, her throat has hurt, denies dysphagia, recently completed po abx for sore throat.)    Sore Throat       Review of Systems   HENT:  Positive for sore throat.    All other systems reviewed and are negative.      Objective:      Physical Exam  General: NAD  Head: Normocephalic, atraumatic, no facial asymmetry/normal strength,  Ears: Both auricules normal in appearance, w/o deformities tympanic membranes normal external auditory canals normal  Nose: External nose w/o deformities normal turbinates no drainage or inflammation  Oral Cavity: Lips, gums, floor of mouth, tongue hard palate, and buccal mucosa without mass/lesion  Oropharynx: Mucosa pink and moist, soft palate, posterior pharynx and oropharyngeal wall without mass/lesion  Neck: Supple, symmetric, trachea midline, no palpable mass/lesion, no palpable cervical lymphadenopathy  Skin: Warm and dry, no concerning lesions  Respiratory: Respirations even, unlabored  Assessment:       1. Thyroid nodule    2. Throat pain        Plan:       Reviewed U/S thyroid   Not big enough for FNA rec re U/S 6 months    Prilosec for sore throat

## 2025-04-30 ENCOUNTER — TELEPHONE (OUTPATIENT)
Dept: UROLOGY | Facility: CLINIC | Age: 50
End: 2025-04-30
Payer: MEDICARE

## 2025-04-30 NOTE — TELEPHONE ENCOUNTER
I called pt and got her VM.  I left message that I am calling back in regards to her myrbetriq and some swelling she is having.  I am checking to see how long this is going on, how severe it is,  If she gets my message in next few minutes, call me back.  I called pt back this morning 5/1/25 and spoke with her.  She reports she took Myrbetriq Luis morning before [place], sat a while driving to [place] sat couple of hours for conference.  Got up and could not keep shoe on due to the swelling of her feet.  Her ankles and sides of them were swollen.  Had ot had this problem before.  She only voided 1 time that day.  Monday has some swelling but not as much, soaked feet in warm water and epsom salt.  Today feet back to normal.  Would like to know about this?  I will relay to MICHELET Barker and get back with her.

## 2025-05-01 ENCOUNTER — OFFICE VISIT (OUTPATIENT)
Dept: FAMILY MEDICINE | Facility: CLINIC | Age: 50
End: 2025-05-01
Payer: MEDICARE

## 2025-05-01 VITALS
HEART RATE: 62 BPM | OXYGEN SATURATION: 100 % | TEMPERATURE: 98 F | SYSTOLIC BLOOD PRESSURE: 116 MMHG | HEIGHT: 58 IN | DIASTOLIC BLOOD PRESSURE: 79 MMHG | RESPIRATION RATE: 18 BRPM | BODY MASS INDEX: 32.12 KG/M2 | WEIGHT: 153 LBS

## 2025-05-01 DIAGNOSIS — E11.9 DIABETES MELLITUS WITHOUT COMPLICATION: Primary | ICD-10-CM

## 2025-05-01 DIAGNOSIS — Z12.31 ENCOUNTER FOR SCREENING MAMMOGRAM FOR MALIGNANT NEOPLASM OF BREAST: ICD-10-CM

## 2025-05-01 DIAGNOSIS — G25.81 RESTLESS LEG SYNDROME DUE TO IRON DEFICIENCY ANEMIA: ICD-10-CM

## 2025-05-01 DIAGNOSIS — N32.81 OAB (OVERACTIVE BLADDER): Chronic | ICD-10-CM

## 2025-05-01 DIAGNOSIS — M06.042 RHEUMATOID ARTHRITIS INVOLVING BOTH HANDS WITH NEGATIVE RHEUMATOID FACTOR: ICD-10-CM

## 2025-05-01 DIAGNOSIS — E11.21 DIABETIC NEPHROPATHY ASSOCIATED WITH TYPE 2 DIABETES MELLITUS: Chronic | ICD-10-CM

## 2025-05-01 DIAGNOSIS — E87.6 HYPOKALEMIA: ICD-10-CM

## 2025-05-01 DIAGNOSIS — M06.041 RHEUMATOID ARTHRITIS INVOLVING BOTH HANDS WITH NEGATIVE RHEUMATOID FACTOR: ICD-10-CM

## 2025-05-01 DIAGNOSIS — I10 ESSENTIAL HYPERTENSION, BENIGN: Chronic | ICD-10-CM

## 2025-05-01 DIAGNOSIS — L30.8 OTHER ECZEMA: ICD-10-CM

## 2025-05-01 DIAGNOSIS — R21 RASH: ICD-10-CM

## 2025-05-01 DIAGNOSIS — M79.2 NEURALGIA: ICD-10-CM

## 2025-05-01 DIAGNOSIS — D50.9 RESTLESS LEG SYNDROME DUE TO IRON DEFICIENCY ANEMIA: ICD-10-CM

## 2025-05-01 DIAGNOSIS — M32.19 SYSTEMIC LUPUS ERYTHEMATOSUS WITH OTHER ORGAN INVOLVEMENT, UNSPECIFIED SLE TYPE: Chronic | ICD-10-CM

## 2025-05-01 DIAGNOSIS — E78.2 MIXED HYPERLIPIDEMIA: Chronic | ICD-10-CM

## 2025-05-01 DIAGNOSIS — Z78.9 STATIN INTOLERANCE: ICD-10-CM

## 2025-05-01 DIAGNOSIS — N39.46 MIXED INCONTINENCE URGE AND STRESS: ICD-10-CM

## 2025-05-01 PROBLEM — B37.2: Status: RESOLVED | Noted: 2022-12-21 | Resolved: 2025-05-01

## 2025-05-01 LAB
ANION GAP SERPL CALCULATED.3IONS-SCNC: 12 MMOL/L (ref 7–16)
BUN SERPL-MCNC: 7 MG/DL (ref 10–20)
BUN/CREAT SERPL: 9 (ref 6–20)
CALCIUM SERPL-MCNC: 9 MG/DL (ref 8.4–10.2)
CHLORIDE SERPL-SCNC: 107 MMOL/L (ref 98–107)
CHOLEST SERPL-MCNC: 226 MG/DL
CHOLEST/HDLC SERPL: 5.3 {RATIO}
CO2 SERPL-SCNC: 24 MMOL/L (ref 22–29)
CREAT SERPL-MCNC: 0.8 MG/DL (ref 0.55–1.02)
EGFR (NO RACE VARIABLE) (RUSH/TITUS): 90 ML/MIN/1.73M2
EST. AVERAGE GLUCOSE BLD GHB EST-MCNC: 120 MG/DL
GLUCOSE SERPL-MCNC: 106 MG/DL (ref 74–100)
HBA1C MFR BLD HPLC: 5.8 %
HDLC SERPL-MCNC: 43 MG/DL (ref 35–60)
LDLC SERPL CALC-MCNC: 156 MG/DL
LDLC/HDLC SERPL: 3.6 {RATIO}
NONHDLC SERPL-MCNC: 183 MG/DL
POTASSIUM SERPL-SCNC: 4 MMOL/L (ref 3.5–5.1)
SODIUM SERPL-SCNC: 139 MMOL/L (ref 136–145)
TRIGL SERPL-MCNC: 137 MG/DL (ref 37–140)
VLDLC SERPL-MCNC: 27 MG/DL

## 2025-05-01 PROCEDURE — 99214 OFFICE O/P EST MOD 30 MIN: CPT | Mod: ,,, | Performed by: NURSE PRACTITIONER

## 2025-05-01 PROCEDURE — 80048 BASIC METABOLIC PNL TOTAL CA: CPT | Mod: ,,, | Performed by: CLINICAL MEDICAL LABORATORY

## 2025-05-01 PROCEDURE — 83036 HEMOGLOBIN GLYCOSYLATED A1C: CPT | Mod: ,,, | Performed by: CLINICAL MEDICAL LABORATORY

## 2025-05-01 PROCEDURE — 80061 LIPID PANEL: CPT | Mod: ,,, | Performed by: CLINICAL MEDICAL LABORATORY

## 2025-05-01 RX ORDER — MELATONIN 1 MG
1 TABLET,CHEWABLE ORAL NIGHTLY
COMMUNITY
Start: 2025-03-10

## 2025-05-01 RX ORDER — TRIAMCINOLONE ACETONIDE 1 MG/G
CREAM TOPICAL 2 TIMES DAILY
Qty: 45 G | Refills: 0 | Status: SHIPPED | OUTPATIENT
Start: 2025-05-01

## 2025-05-01 NOTE — PROGRESS NOTES
HUGO Pereira   [facility]  [address]   [place] [address]     PATIENT NAME: Jorge Ivory  : 1975  DATE: 25  MRN: [id number]      Billing Provider: HUGO Pereira  Level of Service:   Patient PCP Information       Provider PCP Type    HUGO Pereira General              Reason for Visit / Chief Complaint: Follow-up (6 month follow up), Health Maintenance (COVID-19 Vaccine DECLINED DUE TO SIDE AFFECTS/Shingles Vaccine DISCUSSED AND MAY HAVE ALREADY RECEIVED THIS AT PHARMACY AND HAVE REQUESTED THOSE RECORDS/Lipid Panel due on 2024/Hemoglobin A1c PT HAD THIS DONE 2025 WITH ARTHRITIS DOCTOR/Foot Exam due on 2025/Mammogram due WANTS SCHEDULED AT [facility]), Edema (Swelling in feet ; she was attending a seminar at [employer] and had sat for a very long time. I explained that she should move around every 20 minutes ), and Thyroid Nodule (Had a Fast Pace visit for a sore throat and had a thyroid ultra sound and found a small nodule. Pt was told to repeat this in 6 months.)       History of Present Illness / Problem Focused Workflow     History of Present Illness    CHIEF COMPLAINT:  Patient presents today for diabetes check-up    DIABETES:  She reports elevated blood sugars, which she attributes to a recent Toradol injection from her rheumatologist. Home glucose monitoring shows readings ranging from 79 to 223. Blood sugar was 107 mg/dL in April, and Hemoglobin A1C was 5.9% in October of the previous year.    CARDIOVASCULAR:  She reports palpitations and chest pain with exertion. She experiences shortness of breath with minimal exertion, noting difficulty breathing after walking short distances from her door to the car. She reports chest tightness, particularly when climbing stairs. She recently experienced atypical foot swelling after prolonged sitting during a [employer] seminar, resulting in difficulty fitting her shoe.    THYROID:  She reports swollen throat with  enlarged thyroid. Thyroid ultrasound showed a small nodule, currently too small for biopsy.    CONSTITUTIONAL:  She reports increased fatigue beyond her baseline level of tiredness, describing it as a different type of fatigue.    ORAL:  She reports resolved mouth sores that occurred both inside and outside of oral cavity.    LABS:  Basic metabolic panel in April showed low potassium of 3.2 mg/dL.    CURRENT MEDICATIONS:  Current medications include Advair PRN, Buspirone 5mg PRN, B12 tablet daily, Restasis for eyes, Voltaren/diclofenac PRN, Lexapro for depression, Claritin for allergies, Metoprolol daily, Melatonin PRN for sleep, and Triamcinolone for eczema.      ROS:  General: -fever, -chills, +fatigue, -weight gain, -weight loss  Eyes: -vision changes, -redness, -discharge  ENT: -ear pain, -nasal congestion, -sore throat, +sense of lump/mass in throat when swallowing, +mouth lesions, +loss of taste  Cardiovascular: -chest pain, +palpitations, +lower extremity edema, +chest pain with exertion, +chest tightness  Respiratory: -cough, -shortness of breath, +exertional dyspnea  Gastrointestinal: -abdominal pain, -nausea, -vomiting, -diarrhea, -constipation, -blood in stool  Genitourinary: -dysuria, -hematuria, -frequency  Musculoskeletal: -joint pain, -muscle pain  Skin: +rash, -lesion  Neurological: -headache, -dizziness, -numbness, -tingling, +restless legs  Psychiatric: -anxiety, -depression, -sleep difficulty  Endocrine: +neck lumps           Medical / Social / Family History     Past Medical History:   Diagnosis Date    Anemia     Diabetes mellitus, type 2     Hypertension     Lupus (systemic lupus erythematosus) 2019    Other long term (current) drug therapy     Rheumatoid arthritis 2015    Statin intolerance 2023    Due to drug induced myalgia    Vitamin D deficiency, unspecified        Past Surgical History:   Procedure Laterality Date    CARPAL TUNNEL RELEASE       SECTION       COLONOSCOPY  09/12/2018    [facility]--Dr. Isaac    HYSTERECTOMY      TUBAL LIGATION  02/02/2006       Social History  Ms.  reports that she has never smoked. She has never been exposed to tobacco smoke. She has never used smokeless tobacco. She reports that she does not drink alcohol and does not use drugs.    Family History  Ms.'s family history includes Arthritis in her father and sister; Asthma in her mother; Cancer in her maternal aunt, maternal grandmother, and paternal grandmother; Diabetes in her maternal aunt, maternal grandfather, and mother; Heart disease in her maternal aunt, maternal grandmother, and mother; Heart failure in her mother; Hyperlipidemia in her maternal aunt and maternal grandmother; Hypertension in her maternal aunt, maternal grandmother, and mother; Kidney disease in her maternal aunt; Miscarriages / Stillbirths in her mother; Thyroid disease in her mother; Vision loss in her maternal grandfather, maternal grandmother, maternal uncle, and mother.    Medications and Allergies     Medications  Outpatient Medications Marked as Taking for the 5/1/25 encounter (Office Visit) with Angie Underwood FNP   Medication Sig Dispense Refill    acetaminophen (TYLENOL) 500 MG tablet Take 500 mg by mouth 2 (two) times daily as needed.      ADVAIR DISKUS 500-50 mcg/dose DsDv diskus inhaler Inhale 1 puff into the lungs daily as needed.      artificial tears ointment (REFRESH P.M.) Oint Place into both eyes every evening.      busPIRone (BUSPAR) 5 MG Tab Take 1 tablet by mouth three times a day as needed for anxiety      cyanocobalamin, vitamin B-12, 5,000 mcg TbDL Take 1 tablet by mouth once daily.      cycloSPORINE (RESTASIS) 0.05 % ophthalmic emulsion Place 1 drop into both eyes 2 (two) times daily.      diclofenac (VOLTAREN) 75 MG EC tablet Take 75 mg by mouth 2 (two) times daily.      diclofenac sodium (VOLTAREN) 1 % Gel Apply 1 application  topically 4 (four) times daily.      EScitalopram oxalate  "(LEXAPRO) 10 MG tablet Take 10 mg by mouth once daily.      loratadine (CLARITIN) 10 mg tablet Take 1 tablet (10 mg total) by mouth once daily. One tab daily for allergies, sinus drainage as needed 90 tablet 3    melatonin 1 mg Chew Take 1 mg by mouth nightly.      methocarbamoL (ROBAXIN) 500 MG Tab Take 500 mg by mouth 2 (two) times daily as needed.      metoprolol succinate (TOPROL-XL) 25 MG 24 hr tablet Take 12.5 mg by mouth once daily.      mirabegron (MYRBETRIQ) 25 mg Tb24 ER tablet Take one tablet at bedtime 30 tablet 11    olmesartan (BENICAR) 5 MG Tab Take 5 mg by mouth once daily.      olopatadine (PATADAY ONCE DAILY RELIEF) 0.2 % Drop Place 1 drop into both eyes once daily.      pitavastatin calcium (LIVALO) 4 mg Tab Take 1 tablet by mouth once daily.      [DISCONTINUED] triamcinolone acetonide 0.1% (KENALOG) 0.1 % cream Apply topically 2 (two) times daily. 45 g 0       Allergies  Review of patient's allergies indicates:   Allergen Reactions    Iodinated contrast media Shortness Of Breath and Rash    Iodine and iodide containing products Anaphylaxis    Iodine containing multivitamin Anaphylaxis    Maxitrol [neomycin-polymyxin b-dexameth] Swelling     Made eye swell    Tree nut Swelling    Cholecalciferol (vitamin d3)     Lisinopril Other (See Comments)    Methotrexate sodium     Shellfish derived     Statins-hmg-coa reductase inhibitors Other (See Comments)     Severe myalgia's with statin      Vicryl [sutures, polyglycolic acid]     Lincomycin Hives and Rash    Penicillins Rash     Other reaction(s): Rash, DifficultyBreathing       Physical Examination     Vitals:    05/01/25 1004   BP: 116/79   Pulse: 62   Resp: 18   Temp: 98.1 °F (36.7 °C)   TempSrc: Oral   SpO2: 100%   Weight: 69.4 kg (153 lb)   Height: 4' 10" (1.473 m)        Physical Exam    General: No acute distress. Well-developed. Well-nourished.  Eyes: EOMI. Sclerae anicteric.  HENT: Normocephalic. Atraumatic. Nares patent. " Moist oral mucosa.    Cardiovascular: Regular rate. Regular rhythm. No murmurs. No rubs. No gallops. Normal S1, S2. Good pedal pulse. Palpable pulses in the left foot.  Respiratory: Normal respiratory effort. Clear to auscultation bilaterally. No rales. No rhonchi. No wheezing.  Musculoskeletal: No  obvious deformity.  Extremities: No lower extremity edema.  Neurological: Alert & oriented x3. No slurred speech. Normal gait. Good sensation in all 10 tested sites. Sensation in all toes on her left foot.  Psychiatric: Normal mood. Normal affect. Good insight. Good judgment.  Skin: Warm. Dry. No rash. Toenails looked good.       Physical Exam  Cardiovascular:      Pulses:           Dorsalis pedis pulses are 2+ on the right side and 2+ on the left side.        Posterior tibial pulses are 1+ on the right side and 1+ on the left side.   Musculoskeletal:      Right foot: Normal range of motion. No deformity, bunion, Charcot foot, foot drop or prominent metatarsal heads.      Left foot: Normal range of motion. No deformity, bunion, Charcot foot, foot drop or prominent metatarsal heads.   Feet:      Right foot:      Protective Sensation: 10 sites tested.  10 sites sensed.      Skin integrity: Skin integrity normal.      Toenail Condition: Right toenails are normal.      Left foot:      Protective Sensation: 10 sites tested.  10 sites sensed.      Skin integrity: Skin integrity normal.      Toenail Condition: Left toenails are normal.        Laboratory     Lab Results   Component Value Date     07/22/2024     07/22/2024    K 4.5 07/22/2024     07/22/2024    CO2 28 07/22/2024    BUN 11 07/22/2024    CREATININE 0.78 07/22/2024    CALCIUM 9.2 07/22/2024    PROT 7.0 07/22/2024    ALBUMIN 3.9 07/22/2024    BILITOT 0.5 07/22/2024    ALKPHOS 90 07/22/2024    AST 56 (H) 07/22/2024    ALT 69 (H) 07/22/2024    ANIONGAP 10 07/22/2024    ESTGFRAFRICA 112 08/09/2021    EGFRNONAA 70 04/06/2022       Lab Results  "  Component Value Date    WBC 5.18 02/25/2025    RBC 5.04 02/25/2025    HGB 13.8 02/25/2025    HGB 13.3 02/17/2025    HCT 45.5 02/25/2025    MCV 90.3 02/25/2025    RDW 12.9 02/25/2025     02/25/2025        Lab Results   Component Value Date    CHOL 201 (H) 12/12/2023    TRIG 99 12/12/2023    HDL 46 12/12/2023    LDLCALC 135 12/12/2023       Lab Results   Component Value Date    TSH 0.909 02/25/2025       Lab Results   Component Value Date    HGBA1C 5.9 10/21/2024    ESTIMATEDAVG 123 10/21/2024        Lab Results   Component Value Date    SCMGCPAC82 475 07/22/2024       Lab Results   Component Value Date    VTWKRRFW05YI 23.6 07/22/2024       No results found for: "PSA"    Assessment and Plan (including Health Maintenance)     :Assessment & Plan    M06.041, M06.042 Rheumatoid arthritis without rheumatoid factor, right hand  E11.65 Type 2 diabetes mellitus with hyperglycemia  M32.9 Systemic lupus erythematosus, unspecified  R07.82 Intercostal pain  R06.02 Shortness of breath  E04.1 Nontoxic single thyroid nodule  F32.9 Major depressive disorder, single episode, unspecified  E87.6 Hypokalemia  L30.9 Dermatitis, unspecified  J30.9 Allergic rhinitis, unspecified  Z86.19 Personal history of other infectious and parasitic diseases    IMPRESSION:  - Reviewed recent labs: potassium slightly low at 3.2 (normal 3.5-5).  - Noted good glucose (107) and renal function (creatinine 0.75) from recent labs.  - Hemoglobin A1C from October 21st was 5.9.  - Considered viral infection as cause of recent mouth sores.  - Discussed potential for changing from metoprolol to carvedilol with Dr. Wild if fatigue persists.  - Evaluated appropriateness of shingles vaccine, considering occasional steroid use.  - Explained that sitting for long periods can cause foot swelling, but it is usually not concerning if swelling subsides.    RHEUMATOID ARTHRITIS WITHOUT RHEUMATOID FACTOR, RIGHT HAND:  - Patient continues to see rheumatologist for " management of rheumatoid arthritis.  -- Patient reports swollen throat and thyroid enlargement, which may be related to rheumatoid arthritis.  - Advised to continue monitoring the thyroid nodule with follow up in 6 months.    TYPE 2 DIABETES MELLITUS:  - Blood sugar levels range from 79 to 223.  - Hemoglobin A1C was excellent at 5.9 on October 21st.  - Ordered repeat A1C to monitor diabetes control.  - Performed comprehensive diabetic foot exam.    SYSTEMIC LUPUS ERYTHEMATOSUS:  - Discussed how lupus can affect various organs including lungs and kidneys.  - Confirmed patient is not on any home injections for lupus management.    INTERCOSTAL PAIN:  - Prescribed Medrol Dosepak for inflammation or scar tissue causing intercostal pain.    SHORTNESS OF BREATH:  - Patient experiences dyspnea with exertion and has a scheduled pulmonology consultation.    THYROID NODULE:  - Monitoring thyroid nodule found on ultrasonography, currently too small for biopsy.    MAJOR DEPRESSIVE DISORDER:  - Patient is taking Lexapro for depression with effective symptom control.    HYPOKALEMIA:  - Potassium level was 3.2 on April 11th (normal range 3.5-5), confirming hypokalemia.  - Initiated potassium supplementation and instructed patient to return for follow-up labs to reassess levels.    DERMATITIS:  - Patient uses triamcinolone for eczema management.    ALLERGIC RHINITIS:  - Patient takes Claritin for allergic rhinitis with resulting symptom resolution.    HISTORY OF INFECTIOUS DISEASES:  - Educated patient about the presence of varicella zoster virus in those who had chickenpox during childhood.  - Patient had a viral infection causing oral ulcerations, which resolved spontaneously.    GENERAL HEALTH:  - Patient to continue incorporating more fruits in diet.             Problem List Items Addressed This Visit       Neuralgia    Essential hypertension, benign (Chronic)    Diabetes mellitus without complication - Primary    Lupus  (systemic lupus erythematosus) (Chronic)    Hyperlipidemia (Chronic)    Rheumatoid arthritis (Chronic)    Restless leg syndrome due to iron deficiency anemia    RESOLVED: Statin intolerance    Overview   Due to drug induced myalgia         Mixed incontinence urge and stress    OAB (overactive bladder) (Chronic)    Diabetic nephropathy associated with type 2 diabetes mellitus (Chronic)     Other Visit Diagnoses         Rash          Hypokalemia          Encounter for screening mammogram for malignant neoplasm of breast          Other eczema            .      Health Maintenance Due   Topic Date Due    COVID-19 Vaccine (3 - 2024-25 season) 09/01/2024    Shingles Vaccine (1 of 2) Never done    Lipid Panel  12/12/2024    Hemoglobin A1c  04/21/2025    Foot Exam  04/30/2025    Mammogram  06/06/2025     Health Maintenance Topics with due status: Not Due       Topic Last Completion Date    Colorectal Cancer Screening 09/12/2018    Diabetes Urine Screening 08/21/2024    TETANUS VACCINE 10/21/2024    Diabetic Eye Exam 10/28/2024    RSV Vaccine (Age 60+ and Pregnant patients) Not Due       Procedures     Future Appointments   Date Time Provider Department Center   8/27/2025  1:00 PM [facility]   9/10/2025  1:00 PM Kobe Barker, NP [facility]        Follow up in approx 3 motns for  diabetes check up    Signature:  HUGO Pereira    Date of encounter: 5/1/25      This note was generated with the assistance of ambient listening technology. Verbal consent was obtained by the patient and accompanying visitor(s) for the recording of patient appointment to facilitate this note. I attest to having reviewed and edited the generated note for accuracy, though some syntax or spelling errors may persist. Please contact the author of this note for any clarification.

## 2025-05-01 NOTE — Clinical Note
Pt reports having 4 vaccines possibly last year at the [facility]. She thinks that one was Shingles

## 2025-05-02 ENCOUNTER — TELEPHONE (OUTPATIENT)
Dept: UROLOGY | Facility: CLINIC | Age: 50
End: 2025-05-02
Payer: MEDICARE

## 2025-05-02 ENCOUNTER — RESULTS FOLLOW-UP (OUTPATIENT)
Dept: FAMILY MEDICINE | Facility: CLINIC | Age: 50
End: 2025-05-02

## 2025-05-02 NOTE — TELEPHONE ENCOUNTER
I called the pt back this morning.  I told her that NP Cherie said  she has been on this medicine since at least August of 2024 if he is  reading the prescription right,  if she was going to have swelling from the medication it would  have done it a lot sooner than this.   She can follow-up with her PCP about the leg edema this could be related to 1 of her other medications for example if she was on something like Norvasc that could cause leg swelling however, if she thinks the Myrbetriq is  what is causing it,  tell her the leave the medication off for a month and see if this improves but he  would still follow-up with the PCP about the leg swelling this could be cardiac related also.  He  did not see leg edema as a specific side effect for Myrbetriq.  Pt voiced understanding and said she did not know, she was just trying to find out.  She said she will definitely check with her primary doctor and see what they think.

## 2025-05-02 NOTE — PROGRESS NOTES
Notify Sincere that her cholesterol is elevated more than previously. --heart healthy  diet and regular exercise. Electrolytes are all normal and kdideny function is good.  A1c is 5.8 and good.    Frances ARIAS

## 2025-05-27 ENCOUNTER — OFFICE VISIT (OUTPATIENT)
Dept: FAMILY MEDICINE | Facility: CLINIC | Age: 50
End: 2025-05-27
Payer: MEDICARE

## 2025-05-27 ENCOUNTER — TELEPHONE (OUTPATIENT)
Dept: FAMILY MEDICINE | Facility: CLINIC | Age: 50
End: 2025-05-27
Payer: MEDICARE

## 2025-05-27 VITALS
OXYGEN SATURATION: 99 % | BODY MASS INDEX: 32.19 KG/M2 | HEIGHT: 58 IN | HEART RATE: 66 BPM | TEMPERATURE: 99 F | WEIGHT: 153.38 LBS | RESPIRATION RATE: 18 BRPM | DIASTOLIC BLOOD PRESSURE: 72 MMHG | SYSTOLIC BLOOD PRESSURE: 112 MMHG

## 2025-05-27 DIAGNOSIS — E11.9 DIABETES MELLITUS WITHOUT COMPLICATION: ICD-10-CM

## 2025-05-27 DIAGNOSIS — R00.1 BRADYCARDIA: ICD-10-CM

## 2025-05-27 DIAGNOSIS — I10 ESSENTIAL HYPERTENSION, BENIGN: Chronic | ICD-10-CM

## 2025-05-27 DIAGNOSIS — D50.9 RESTLESS LEG SYNDROME DUE TO IRON DEFICIENCY ANEMIA: ICD-10-CM

## 2025-05-27 DIAGNOSIS — G25.81 RESTLESS LEG SYNDROME DUE TO IRON DEFICIENCY ANEMIA: ICD-10-CM

## 2025-05-27 DIAGNOSIS — E55.9 VITAMIN D DEFICIENCY: ICD-10-CM

## 2025-05-27 DIAGNOSIS — M54.2 NECK PAIN: ICD-10-CM

## 2025-05-27 DIAGNOSIS — M54.16 LUMBAR BACK PAIN WITH RADICULOPATHY AFFECTING LEFT LOWER EXTREMITY: Primary | ICD-10-CM

## 2025-05-27 DIAGNOSIS — M06.9 RHEUMATOID ARTHRITIS INVOLVING MULTIPLE SITES, UNSPECIFIED WHETHER RHEUMATOID FACTOR PRESENT: Chronic | ICD-10-CM

## 2025-05-27 DIAGNOSIS — E78.2 MIXED HYPERLIPIDEMIA: Chronic | ICD-10-CM

## 2025-05-27 LAB — MAGNESIUM SERPL-MCNC: 1.9 MG/DL (ref 1.6–2.6)

## 2025-05-27 PROCEDURE — 83735 ASSAY OF MAGNESIUM: CPT | Mod: ,,, | Performed by: CLINICAL MEDICAL LABORATORY

## 2025-05-27 PROCEDURE — 99214 OFFICE O/P EST MOD 30 MIN: CPT | Mod: ,,, | Performed by: NURSE PRACTITIONER

## 2025-05-27 RX ORDER — OXYBUTYNIN CHLORIDE 5 MG/1
5 TABLET, EXTENDED RELEASE ORAL DAILY
COMMUNITY
End: 2025-05-27

## 2025-05-27 RX ORDER — METHOCARBAMOL 500 MG/1
500 TABLET, FILM COATED ORAL 4 TIMES DAILY
Qty: 120 TABLET | Refills: 2 | Status: SHIPPED | OUTPATIENT
Start: 2025-05-27

## 2025-05-27 RX ORDER — ALBUTEROL SULFATE 90 UG/1
2 INHALANT RESPIRATORY (INHALATION) EVERY 6 HOURS PRN
Qty: 18 G | Refills: 0 | Status: SHIPPED | OUTPATIENT
Start: 2025-05-27 | End: 2026-05-27

## 2025-05-27 NOTE — TELEPHONE ENCOUNTER
----- Message from Alicia sent at 5/27/2025  2:45 PM CDT -----  [facility] pharmacy need a call back regarding methocarbamol [phone]

## 2025-05-27 NOTE — TELEPHONE ENCOUNTER
Pharmacy called to let us know her insurance prefers tizanidine instead of Robaxin. Robaxin is $19 on discount card. I will wait and see if she gets it before changing.

## 2025-05-27 NOTE — PROGRESS NOTES
HUGO Pereira   [facility]  [address] [address]   [place] [address]     PATIENT NAME: Jorge Ivory  : 1975  DATE: 25  MRN: [id number]      Billing Provider: HUGO Pereira  Level of Service: CA OFFICE/OUTPT VISIT, EST, LEVL IV, 30-39 MIN  Patient PCP Information       Provider PCP Type    HUGO Pereira General              Reason for Visit / Chief Complaint: joint pain (Joint and muscle pain)       History of Present Illness / Problem Focused Workflow     History of Present Illness    CHIEF COMPLAINT:  Patient presents today for increased joint pain and muscle pain with summer flare    MUSCULOSKELETAL PAIN:  She reports chronic daily back pain localized to L4-L5 region. X-rays from 2023 showed normal lordotic curvature and preserved disc spaces. She has neck pain with associated arthritis; X-ray showed narrowing at C5-C6 vertebrae. She last saw rheumatologist Dr. Mukherjee in February and received a Toradol injection. Lab work at that time showed inflammation but was otherwise unremarkable. She reports left leg pain that began one week ago Tuesday, starting just below the buttock and extending down the left posterior thigh and side, with some pain in the lower leg and ankles.    NEUROLOGICAL SYMPTOMS:  She reports experiencing constant muscle twitching throughout the day that began 1 week ago, affecting multiple locations in both arms and legs. She performs daily stretches to address muscle tightness.  She notes however, that she has had these muscle twitches since childhood and has never been worked up.     RESPIRATORY:  She reports dyspnea on exertion, becoming winded during activities such as shopping or walking from the store to her car. She develops headaches with increased levels of activity. Pulmonary function test showed improvement with albuterol administration, noting better performance and absence of coughing after use.    CARDIOVASCULAR:  She experienced an episode  of low heart rate over the weekend, dropping to 52, associated with fatigue and mild dizziness requiring rest. Blood sugar was 95 during the episode.    ALLERGIES:  She has severe allergic reactions to vitamin D supplementation. She required an ER visit and steroid injection after taking prescription vitamin D. She attempted OTC vitamin D but developed chest rash after several days of use. She cannot tolerate either prescription or OTC vitamin D formulations.      ROS:  General: -fever, -chills, +fatigue, -weight gain, -weight loss  Eyes: -vision changes, -redness, -discharge  ENT: -ear pain, -nasal congestion, -sore throat  Cardiovascular: -chest pain, -palpitations, -lower extremity edema, +feelings of slow heart rate  Respiratory: -cough, -shortness of breath, +exertional dyspnea  Gastrointestinal: -abdominal pain, -nausea, -vomiting, -diarrhea, -constipation, -blood in stool  Genitourinary: -dysuria, -hematuria, -frequency  Musculoskeletal: +joint pain, +muscle pain, +back pain  Skin: -rash, -lesion  Neurological: +headache, -dizziness, +numbness, +tingling, +twitch/tic  Psychiatric: -anxiety, -depression, -sleep difficulty           Medical / Social / Family History     Past Medical History:   Diagnosis Date    Anemia     Diabetes mellitus, type 2     Hypertension     Lupus (systemic lupus erythematosus) 2019    Other long term (current) drug therapy     Rheumatoid arthritis     Statin intolerance 2023    Due to drug induced myalgia    Vitamin D deficiency, unspecified        Past Surgical History:   Procedure Laterality Date    CARPAL TUNNEL RELEASE       SECTION      COLONOSCOPY  2018    MARCO A--Dr. Isaac    HYSTERECTOMY      TUBAL LIGATION  2006       Social History  Ms.  reports that she has never smoked. She has never been exposed to tobacco smoke. She has never used smokeless tobacco. She reports that she does not drink alcohol and does not use drugs.    Family History  Ms.'s  family history includes Arthritis in her father and sister; Asthma in her mother; Cancer in her maternal aunt, maternal grandmother, and paternal grandmother; Diabetes in her maternal aunt, maternal grandfather, and mother; Heart disease in her maternal aunt, maternal grandmother, and mother; Heart failure in her mother; Hyperlipidemia in her maternal aunt and maternal grandmother; Hypertension in her maternal aunt, maternal grandmother, and mother; Kidney disease in her maternal aunt; Miscarriages / Stillbirths in her mother; Thyroid disease in her mother; Vision loss in her maternal grandfather, maternal grandmother, maternal uncle, and mother.    Medications and Allergies     Medications  Outpatient Medications Marked as Taking for the 5/27/25 encounter (Office Visit) with Angie Underwood FNP   Medication Sig Dispense Refill    acetaminophen (TYLENOL) 500 MG tablet Take 500 mg by mouth 2 (two) times daily as needed.      artificial tears ointment (REFRESH P.M.) Oint Place into both eyes every evening.      busPIRone (BUSPAR) 5 MG Tab Take 1 tablet by mouth three times a day as needed for anxiety      cyanocobalamin, vitamin B-12, 5,000 mcg TbDL Take 1 tablet by mouth once daily.      cycloSPORINE (RESTASIS) 0.05 % ophthalmic emulsion Place 1 drop into both eyes 2 (two) times daily.      diclofenac (VOLTAREN) 75 MG EC tablet Take 75 mg by mouth 2 (two) times daily.      diclofenac sodium (VOLTAREN) 1 % Gel Apply 1 application  topically 4 (four) times daily.      EScitalopram oxalate (LEXAPRO) 10 MG tablet Take 10 mg by mouth once daily.      loratadine (CLARITIN) 10 mg tablet Take 1 tablet (10 mg total) by mouth once daily. One tab daily for allergies, sinus drainage as needed 90 tablet 3    melatonin 1 mg Chew Take 1 mg by mouth nightly.      metoprolol succinate (TOPROL-XL) 25 MG 24 hr tablet Take 12.5 mg by mouth once daily.      mirabegron (MYRBETRIQ) 25 mg Tb24 ER tablet Take one tablet at bedtime 30 tablet 11  "   olmesartan (BENICAR) 5 MG Tab Take 5 mg by mouth once daily.      olopatadine (PATADAY ONCE DAILY RELIEF) 0.2 % Drop Place 1 drop into both eyes once daily.      pitavastatin calcium (LIVALO) 4 mg Tab Take 1 tablet by mouth once daily.      triamcinolone acetonide 0.1% (KENALOG) 0.1 % cream Apply topically 2 (two) times daily. 45 g 0    [DISCONTINUED] methocarbamoL (ROBAXIN) 500 MG Tab Take 500 mg by mouth 2 (two) times daily as needed.         Allergies  Review of patient's allergies indicates:   Allergen Reactions    Iodinated contrast media Shortness Of Breath and Rash    Iodine and iodide containing products Anaphylaxis    Iodine containing multivitamin Anaphylaxis    Maxitrol [neomycin-polymyxin b-dexameth] Swelling     Made eye swell    Tree nut Swelling    Cholecalciferol (vitamin d3)     Lisinopril Other (See Comments)    Methotrexate sodium     Shellfish derived     Statins-hmg-coa reductase inhibitors Other (See Comments)     Severe myalgia's with statin      Vicryl [sutures, polyglycolic acid]     Lincomycin Hives and Rash    Penicillins Rash     Other reaction(s): Rash, DifficultyBreathing       Physical Examination     Vitals:    05/27/25 1017   BP: 112/72   Pulse: 66   Resp: 18   Temp: 98.7 °F (37.1 °C)   TempSrc: Oral   SpO2: 99%   Weight: 69.6 kg (153 lb 6.4 oz)   Height: 4' 10" (1.473 m)        Physical Exam    General: No acute distress. Well-developed. Well-nourished.  Eyes: EOMI. Sclerae anicteric.  HENT: Normocephalic. Atraumatic. Nares patent. Moist oral mucosa.  Ears: Bilateral TMs clear. Bilateral EACs clear.  Cardiovascular: Regular rate. Regular rhythm. No murmurs. No rubs. No gallops. Normal S1, S2.  Respiratory: Normal respiratory effort. Clear to auscultation bilaterally. No rales. No rhonchi. No wheezing.  Abdomen: Soft. Non-tender. Non-distended. Normoactive bowel sounds.  Musculoskeletal:  very tender to palp L4/5 area and sacrum.  Extremities: No lower extremity " "edema.  Neurological: Alert & oriented x3. No slurred speech. Normal gait.  Psychiatric: Normal mood. Normal affect. She is concerned about her increasing forgetfullness and has appt set up with Dr Barragan in [place]  Skin: Warm. Dry. No rash.       Physical Exam     Laboratory     Lab Results   Component Value Date     (H) 05/01/2025     05/01/2025    K 4.0 05/01/2025     05/01/2025    CO2 24 05/01/2025    BUN 7 (L) 05/01/2025    CREATININE 0.80 05/01/2025    CALCIUM 9.0 05/01/2025    PROT 7.0 07/22/2024    ALBUMIN 3.9 07/22/2024    BILITOT 0.5 07/22/2024    ALKPHOS 90 07/22/2024    AST 56 (H) 07/22/2024    ALT 69 (H) 07/22/2024    ANIONGAP 12 05/01/2025    ESTGFRAFRICA 112 08/09/2021    EGFRNONAA 70 04/06/2022       Lab Results   Component Value Date    WBC 5.18 02/25/2025    RBC 5.04 02/25/2025    HGB 13.8 02/25/2025    HGB 13.3 02/17/2025    HCT 45.5 02/25/2025    MCV 90.3 02/25/2025    RDW 12.9 02/25/2025     02/25/2025        Lab Results   Component Value Date    CHOL 226 (H) 05/01/2025    TRIG 137 05/01/2025    HDL 43 05/01/2025    LDLCALC 156 05/01/2025       Lab Results   Component Value Date    TSH 0.909 02/25/2025       Lab Results   Component Value Date    HGBA1C 5.8 05/01/2025    ESTIMATEDAVG 120 05/01/2025        Lab Results   Component Value Date    UXWYXSUG40 475 07/22/2024       Lab Results   Component Value Date    LZIBFDPX72UQ 23.6 07/22/2024       No results found for: "PSA"    Assessment and Plan (including Health Maintenance)     :Assessment & Plan    M32.9 Systemic lupus erythematosus, unspecified  M06.9 Rheumatoid arthritis, unspecified  M54.16 Radiculopathy, lumbar region  M47.812 Spondylosis without myelopathy or radiculopathy, cervical region  R25.3 Fasciculation  R06.02 Shortness of breath  R00.1 Bradycardia, unspecified  E55.9 Vitamin D deficiency, unspecified  Z91.09 Other allergy status, other than to drugs and biological substances    SYSTEMIC LUPUS " ERYTHEMATOSUS:  - Considered history of lupus as potential contributor to patient's shortness of breath.    RHEUMATOID ARTHRITIS:  - Considered history of rheumatoid arthritis as potential contributor to shortness of breath.  - Patient reports increased joint pain, especially in hands, and muscle pain with flares during summer.  - Laboratory results showed inflammation but were overall satisfactory.  - Administered a Toradol injection for joint pain.  - Advised the patient to follow up with rheumatologist Dr. Mukherjee.    LUMBAR RADICULOPATHY:  - Patient experiences pain starting below the left buttock, down the left posterior thigh, and lower leg, exacerbated by ambulation and certain stretches.  - Noted chronic back problems with pain around L4, L5, accompanied by fasciculation and numbness in the left leg.  - Recommend obtaining new back radiographs, as previous imaging showed normal disc space and lordotic curvature.    CERVICAL SPONDYLOSIS:  - Patient has arthritis in the cervical spine with narrowing at C5, C6.  - Recommend obtaining new radiographs of the cervical spine.    FASCICULATION:  - Patient experiences muscle fasciculations throughout the day in multiple areas including lower and upper extremities.  - Noted potassium level was 4.0 three weeks ago and magnesium level was within normal limits 1 year ago.  - Recommend consultation with a neurologist for evaluation of muscle fasciculations.    SHORTNESS OF BREATH:  - Patient experiences dyspnea, especially on exertion, sometimes developing cephalgia.  - Reviewed recent pulmonary function tests (PFTs) and chest radiograph, both reported as normal by pulmonologist--Dr Edson Barkley..  - Lungs were clear on exam with no evidence of infiltrate or pulmonary disease.  - Noted possible asthma-like symptoms despite normal PFTs.  - Considered potential left ventricular dysfunction and history of lupus as possible causes for symptoms--recommended follow up with her  cardiologist.    BRADYCARDIA:  - Patient experienced heart rate decrease to 52 bpm, accompanied by fatigue requiring over 12 hours of sleep. Pt has an implanted heart monitor and advised her to contact  cardiologist  regarding the recent bradycardia so that her monitor recordings can be reviewed.   - Blood glucose was 95 during the episode.  - No chest pain reported, but fatigue and dizziness were present.    VITAMIN D DEFICIENCY:  - Patient is consistently vitamin D insufficient and allergic to vitamin D supplements. I am not sure if this is the carrier in the oral formulation but has occurred with  low dose and high dose but vitamin D fortified milk does not bother her.   - Discussed alternatives including foods high in vitamin D such as fortified lactose-free milk and almond milk.  - Instructed patient to check Lactaid milk carton for vitamin D content.  - Recommend increasing sun exposure as a natural way to boost vitamin D levels and advised researching and incorporating vitamin D-rich foods into diet.    ALLERGY STATUS:  - Patient is allergic to sardines and vitamin D supplements.  - Noted history of rash requiring emergency department visit after taking prescription vitamin D, and similar reaction to OTC vitamin D supplements.    FOLLOW-UP:  - Advised patient to follow up with Dr. Chirinos after recent pulmonary function tests and consultation with Dr. Barkley.  - Patient should also fol  low up with rheumatologist  Kasey Perkins NP  as previously mentioned, and call if needed.             Problem List Items Addressed This Visit       Essential hypertension, benign (Chronic)    Diabetes mellitus without complication    Hyperlipidemia (Chronic)    Rheumatoid arthritis (Chronic)    Vitamin D deficiency    Restless leg syndrome due to iron deficiency anemia    Neck pain     Other Visit Diagnoses         Lumbar back pain with radiculopathy affecting left lower extremity    -  Primary      Bradycardia             .      Health Maintenance Due   Topic Date Due    COVID-19 Vaccine (3 - 2024-25 season) 09/01/2024    Shingles Vaccine (1 of 2) Never done    Mammogram  06/06/2025     Health Maintenance Topics with due status: Not Due       Topic Last Completion Date    Colorectal Cancer Screening 09/12/2018    Diabetes Urine Screening 08/21/2024    TETANUS VACCINE 10/21/2024    Diabetic Eye Exam 10/28/2024    Foot Exam 05/01/2025    Lipid Panel 05/01/2025    Hemoglobin A1c 05/01/2025    RSV Vaccine (Age 60+ and Pregnant patients) Not Due       Procedures     Future Appointments   Date Time Provider Department Center   8/27/2025  1:00 PM AWMARY NURSEEVAN [facility]   9/10/2025  1:00 PM Kobe Barker NP [facility]        Follow up in 3-5 days if not improving or symptoms worsen and follow up  after xrays obtained for review.   Signature:  HUGO Pereira    Date of encounter: 5/27/25      This note was generated with the assistance of ambient listening technology. Verbal consent was obtained by the patient and accompanying visitor(s) for the recording of patient appointment to facilitate this note. I attest to having reviewed and edited the generated note for accuracy, though some syntax or spelling errors may persist. Please contact the author of this note for any clarification.

## 2025-05-28 ENCOUNTER — RESULTS FOLLOW-UP (OUTPATIENT)
Dept: FAMILY MEDICINE | Facility: CLINIC | Age: 50
End: 2025-05-28
Payer: MEDICARE

## 2025-06-01 ENCOUNTER — PATIENT MESSAGE (OUTPATIENT)
Dept: FAMILY MEDICINE | Facility: CLINIC | Age: 50
End: 2025-06-01
Payer: MEDICARE

## 2025-06-25 ENCOUNTER — PATIENT OUTREACH (OUTPATIENT)
Facility: HOSPITAL | Age: 50
End: 2025-06-25
Payer: MEDICARE

## 2025-06-25 NOTE — PROGRESS NOTES
06/25/2025   --Chart accessed for: Care gaps  --Care Gaps addressed: mammogram  Care Everywhere updates requested and reviewed.  Health Maintenance Due   Topic Date Due    COVID-19 Vaccine (3 - 2024-25 season) 09/01/2024    Shingles Vaccine (1 of 2) Never done

## 2025-08-27 ENCOUNTER — TELEPHONE (OUTPATIENT)
Dept: UROLOGY | Facility: CLINIC | Age: 50
End: 2025-08-27
Payer: MEDICARE

## 2025-08-27 ENCOUNTER — OFFICE VISIT (OUTPATIENT)
Dept: FAMILY MEDICINE | Facility: CLINIC | Age: 50
End: 2025-08-27
Payer: MEDICARE

## 2025-08-27 VITALS
DIASTOLIC BLOOD PRESSURE: 80 MMHG | OXYGEN SATURATION: 99 % | RESPIRATION RATE: 16 BRPM | BODY MASS INDEX: 32.67 KG/M2 | TEMPERATURE: 99 F | HEART RATE: 67 BPM | SYSTOLIC BLOOD PRESSURE: 132 MMHG | WEIGHT: 155.63 LBS | HEIGHT: 58 IN

## 2025-08-27 DIAGNOSIS — E66.811 OBESITY (BMI 30.0-34.9): ICD-10-CM

## 2025-08-27 DIAGNOSIS — Z78.9 STATIN INTOLERANCE: ICD-10-CM

## 2025-08-27 DIAGNOSIS — M06.9 RHEUMATOID ARTHRITIS INVOLVING MULTIPLE SITES, UNSPECIFIED WHETHER RHEUMATOID FACTOR PRESENT: Chronic | ICD-10-CM

## 2025-08-27 DIAGNOSIS — E04.1 THYROID NODULE: ICD-10-CM

## 2025-08-27 DIAGNOSIS — D50.8 IRON DEFICIENCY ANEMIA SECONDARY TO INADEQUATE DIETARY IRON INTAKE: ICD-10-CM

## 2025-08-27 DIAGNOSIS — M32.14 SYSTEMIC LUPUS ERYTHEMATOSUS WITH GLOMERULAR DISEASE, UNSPECIFIED SLE TYPE: Chronic | ICD-10-CM

## 2025-08-27 DIAGNOSIS — Z00.00 ENCOUNTER FOR SUBSEQUENT ANNUAL WELLNESS VISIT (AWV) IN MEDICARE PATIENT: Primary | ICD-10-CM

## 2025-08-27 DIAGNOSIS — I10 ESSENTIAL HYPERTENSION, BENIGN: Chronic | ICD-10-CM

## 2025-08-27 DIAGNOSIS — Z00.00 ENCOUNTER FOR MEDICARE ANNUAL WELLNESS EXAM: ICD-10-CM

## 2025-08-27 DIAGNOSIS — E55.9 VITAMIN D DEFICIENCY: ICD-10-CM

## 2025-08-27 DIAGNOSIS — R53.83 FATIGUE, UNSPECIFIED TYPE: ICD-10-CM

## 2025-08-27 DIAGNOSIS — E53.8 VITAMIN B12 DEFICIENCY: ICD-10-CM

## 2025-08-27 DIAGNOSIS — M54.16 LUMBAR BACK PAIN WITH RADICULOPATHY AFFECTING LEFT LOWER EXTREMITY: ICD-10-CM

## 2025-08-27 DIAGNOSIS — E11.40 TYPE 2 DIABETES MELLITUS WITH DIABETIC NEUROPATHY, WITHOUT LONG-TERM CURRENT USE OF INSULIN: ICD-10-CM

## 2025-08-27 LAB
25(OH)D3 SERPL-MCNC: 45.8 NG/ML (ref 30–80)
BASOPHILS # BLD AUTO: 0.04 K/UL (ref 0–0.2)
BASOPHILS NFR BLD AUTO: 0.9 % (ref 0–1)
CREAT UR-MCNC: 64 MG/DL (ref 15–325)
DIFFERENTIAL METHOD BLD: ABNORMAL
EOSINOPHIL # BLD AUTO: 0.05 K/UL (ref 0–0.5)
EOSINOPHIL NFR BLD AUTO: 1.1 % (ref 1–4)
ERYTHROCYTE [DISTWIDTH] IN BLOOD BY AUTOMATED COUNT: 13.2 % (ref 11.5–14.5)
FERRITIN SERPL-MCNC: 90 NG/ML (ref 5–204)
HCT VFR BLD AUTO: 43.2 % (ref 38–47)
HGB BLD-MCNC: 13.3 G/DL (ref 12–16)
IMM GRANULOCYTES # BLD AUTO: 0.01 K/UL (ref 0–0.04)
IMM GRANULOCYTES NFR BLD: 0.2 % (ref 0–0.4)
IRON SATN MFR SERPL: 33 % (ref 20–50)
IRON SERPL-MCNC: 98 UG/DL (ref 50–170)
LYMPHOCYTES # BLD AUTO: 1.99 K/UL (ref 1–4.8)
LYMPHOCYTES NFR BLD AUTO: 42.5 % (ref 27–41)
MCH RBC QN AUTO: 27.4 PG (ref 27–31)
MCHC RBC AUTO-ENTMCNC: 30.8 G/DL (ref 32–36)
MCV RBC AUTO: 88.9 FL (ref 80–96)
MICROALBUMIN UR-MCNC: <0.5 MG/DL
MICROALBUMIN/CREAT RATIO PNL UR: NORMAL
MONOCYTES # BLD AUTO: 0.32 K/UL (ref 0–0.8)
MONOCYTES NFR BLD AUTO: 6.8 % (ref 2–6)
MPC BLD CALC-MCNC: 12.1 FL (ref 9.4–12.4)
NEUTROPHILS # BLD AUTO: 2.27 K/UL (ref 1.8–7.7)
NEUTROPHILS NFR BLD AUTO: 48.5 % (ref 53–65)
NRBC # BLD AUTO: 0 X10E3/UL
NRBC, AUTO (.00): 0 %
PLATELET # BLD AUTO: 277 K/UL (ref 150–400)
RBC # BLD AUTO: 4.86 M/UL (ref 4.2–5.4)
TIBC SERPL-MCNC: 203 UG/DL (ref 70–310)
TIBC SERPL-MCNC: 301 UG/DL (ref 250–450)
TRANSFERRIN SERPL-MCNC: 267 MG/DL (ref 180–382)
TSH SERPL DL<=0.005 MIU/L-ACNC: 1.48 UIU/ML (ref 0.35–4.94)
VIT B12 SERPL-MCNC: 532 PG/ML (ref 213–816)
WBC # BLD AUTO: 4.68 K/UL (ref 4.5–11)

## 2025-08-27 PROCEDURE — G0439 PPPS, SUBSEQ VISIT: HCPCS | Mod: ,,,

## 2025-08-27 PROCEDURE — 82570 ASSAY OF URINE CREATININE: CPT | Mod: ,,, | Performed by: CLINICAL MEDICAL LABORATORY

## 2025-08-27 PROCEDURE — 83540 ASSAY OF IRON: CPT | Mod: ,,, | Performed by: CLINICAL MEDICAL LABORATORY

## 2025-08-27 PROCEDURE — 85025 COMPLETE CBC W/AUTO DIFF WBC: CPT | Mod: ,,, | Performed by: CLINICAL MEDICAL LABORATORY

## 2025-08-27 PROCEDURE — 82043 UR ALBUMIN QUANTITATIVE: CPT | Mod: ,,, | Performed by: CLINICAL MEDICAL LABORATORY

## 2025-08-27 PROCEDURE — 84443 ASSAY THYROID STIM HORMONE: CPT | Mod: ,,, | Performed by: CLINICAL MEDICAL LABORATORY

## 2025-08-27 PROCEDURE — 82306 VITAMIN D 25 HYDROXY: CPT | Mod: ,,, | Performed by: CLINICAL MEDICAL LABORATORY

## 2025-08-27 PROCEDURE — 82607 VITAMIN B-12: CPT | Mod: ,,, | Performed by: CLINICAL MEDICAL LABORATORY

## 2025-08-27 PROCEDURE — 82728 ASSAY OF FERRITIN: CPT | Mod: ,,, | Performed by: CLINICAL MEDICAL LABORATORY

## 2025-08-27 PROCEDURE — 83550 IRON BINDING TEST: CPT | Mod: ,,, | Performed by: CLINICAL MEDICAL LABORATORY

## 2025-08-27 RX ORDER — MEMANTINE HYDROCHLORIDE 10 MG/1
TABLET ORAL
COMMUNITY
Start: 2025-07-16

## 2025-08-27 RX ORDER — METHOCARBAMOL 500 MG/1
500 TABLET, FILM COATED ORAL 4 TIMES DAILY
Qty: 120 TABLET | Refills: 2 | Status: SHIPPED | OUTPATIENT
Start: 2025-08-27

## 2025-08-27 RX ORDER — ONDANSETRON 8 MG/1
8 TABLET, ORALLY DISINTEGRATING ORAL 2 TIMES DAILY
Qty: 60 TABLET | Refills: 1 | Status: SHIPPED | OUTPATIENT
Start: 2025-08-27

## 2025-08-27 RX ORDER — DONEPEZIL HYDROCHLORIDE 10 MG/1
20 TABLET, FILM COATED ORAL
COMMUNITY
Start: 2025-07-16 | End: 2025-09-04

## 2025-09-02 ENCOUNTER — HOSPITAL ENCOUNTER (OUTPATIENT)
Dept: RADIOLOGY | Facility: HOSPITAL | Age: 50
Discharge: HOME | End: 2025-09-02
Payer: MEDICARE

## 2025-09-02 DIAGNOSIS — E04.1 THYROID NODULE: ICD-10-CM

## 2025-09-02 PROCEDURE — 76536 US EXAM OF HEAD AND NECK: CPT | Mod: 26,,, | Performed by: STUDENT IN AN ORGANIZED HEALTH CARE EDUCATION/TRAINING PROGRAM

## 2025-09-02 PROCEDURE — 76536 US EXAM OF HEAD AND NECK: CPT | Mod: TC
